# Patient Record
Sex: FEMALE | Race: BLACK OR AFRICAN AMERICAN | NOT HISPANIC OR LATINO | Employment: FULL TIME | URBAN - METROPOLITAN AREA
[De-identification: names, ages, dates, MRNs, and addresses within clinical notes are randomized per-mention and may not be internally consistent; named-entity substitution may affect disease eponyms.]

---

## 2024-08-09 ENCOUNTER — APPOINTMENT (OUTPATIENT)
Dept: PRIMARY CARE | Facility: CLINIC | Age: 44
End: 2024-08-09
Payer: COMMERCIAL

## 2024-08-09 VITALS
DIASTOLIC BLOOD PRESSURE: 80 MMHG | WEIGHT: 245 LBS | SYSTOLIC BLOOD PRESSURE: 118 MMHG | BODY MASS INDEX: 38.45 KG/M2 | HEIGHT: 67 IN | TEMPERATURE: 97.7 F | HEART RATE: 73 BPM | OXYGEN SATURATION: 100 %

## 2024-08-09 DIAGNOSIS — E61.1 LOW IRON: Primary | ICD-10-CM

## 2024-08-09 DIAGNOSIS — E07.9 DISORDER OF THYROID: ICD-10-CM

## 2024-08-09 DIAGNOSIS — R87.618 PAP SMEAR ABNORMALITY OF CERVIX/HUMAN PAPILLOMAVIRUS (HPV) POSITIVE: ICD-10-CM

## 2024-08-09 DIAGNOSIS — E66.9 CLASS 2 OBESITY: ICD-10-CM

## 2024-08-09 DIAGNOSIS — Z13.29 SCREENING FOR THYROID DISORDER: ICD-10-CM

## 2024-08-09 DIAGNOSIS — Z00.00 ROUTINE GENERAL MEDICAL EXAMINATION AT A HEALTH CARE FACILITY: ICD-10-CM

## 2024-08-09 DIAGNOSIS — Z12.31 BREAST CANCER SCREENING BY MAMMOGRAM: ICD-10-CM

## 2024-08-09 DIAGNOSIS — L98.7 EXCESS SKIN: ICD-10-CM

## 2024-08-09 DIAGNOSIS — Z13.21 ENCOUNTER FOR VITAMIN DEFICIENCY SCREENING: ICD-10-CM

## 2024-08-09 PROCEDURE — 99386 PREV VISIT NEW AGE 40-64: CPT | Performed by: FAMILY MEDICINE

## 2024-08-09 PROCEDURE — 3008F BODY MASS INDEX DOCD: CPT | Performed by: FAMILY MEDICINE

## 2024-08-09 RX ORDER — BISMUTH SUBSALICYLATE 262 MG
1 TABLET,CHEWABLE ORAL DAILY
COMMUNITY

## 2024-08-09 ASSESSMENT — PATIENT HEALTH QUESTIONNAIRE - PHQ9
2. FEELING DOWN, DEPRESSED OR HOPELESS: NOT AT ALL
1. LITTLE INTEREST OR PLEASURE IN DOING THINGS: NOT AT ALL
SUM OF ALL RESPONSES TO PHQ9 QUESTIONS 1 AND 2: 0

## 2024-08-09 NOTE — PATIENT INSTRUCTIONS
Neck mass: get ultrasound   Abnormal pap: needs repeat testing this year, referred to gynecology   Preventative: get preventative labs   Excess skin: pt lost >100lbs with diet and exercise, recommend plastic surgery for further evaluation

## 2024-08-19 ENCOUNTER — HOSPITAL ENCOUNTER (OUTPATIENT)
Dept: RADIOLOGY | Facility: HOSPITAL | Age: 44
Discharge: HOME | End: 2024-08-19
Payer: COMMERCIAL

## 2024-08-19 DIAGNOSIS — E07.9 DISORDER OF THYROID: ICD-10-CM

## 2024-08-19 PROCEDURE — 76536 US EXAM OF HEAD AND NECK: CPT

## 2024-08-19 PROCEDURE — 76536 US EXAM OF HEAD AND NECK: CPT | Performed by: STUDENT IN AN ORGANIZED HEALTH CARE EDUCATION/TRAINING PROGRAM

## 2024-08-21 ENCOUNTER — TELEPHONE (OUTPATIENT)
Dept: PRIMARY CARE | Facility: CLINIC | Age: 44
End: 2024-08-21
Payer: COMMERCIAL

## 2024-08-21 DIAGNOSIS — E07.9 DISORDER OF THYROID: Primary | ICD-10-CM

## 2024-08-21 NOTE — TELEPHONE ENCOUNTER
----- Message from Zoie Barboza sent at 8/21/2024 10:00 AM EDT -----  Please call patient and let then know the following:  US showed larger thyroid nodules. Sending patient to ENT for thyroid biopsy

## 2024-09-03 ENCOUNTER — APPOINTMENT (OUTPATIENT)
Dept: OTOLARYNGOLOGY | Facility: CLINIC | Age: 44
End: 2024-09-03
Payer: COMMERCIAL

## 2024-09-03 ENCOUNTER — LAB (OUTPATIENT)
Dept: LAB | Facility: LAB | Age: 44
End: 2024-09-03
Payer: COMMERCIAL

## 2024-09-03 VITALS — WEIGHT: 249.2 LBS | BODY MASS INDEX: 39.11 KG/M2 | HEIGHT: 67 IN | TEMPERATURE: 97.3 F

## 2024-09-03 DIAGNOSIS — E04.2 NONTOXIC MULTINODULAR GOITER: ICD-10-CM

## 2024-09-03 DIAGNOSIS — E61.1 LOW IRON: ICD-10-CM

## 2024-09-03 DIAGNOSIS — Z13.21 ENCOUNTER FOR VITAMIN DEFICIENCY SCREENING: ICD-10-CM

## 2024-09-03 DIAGNOSIS — E07.9 DISORDER OF THYROID: ICD-10-CM

## 2024-09-03 DIAGNOSIS — Z13.29 SCREENING FOR THYROID DISORDER: ICD-10-CM

## 2024-09-03 DIAGNOSIS — E04.1 THYROID NODULE: Primary | ICD-10-CM

## 2024-09-03 DIAGNOSIS — Z00.00 ROUTINE GENERAL MEDICAL EXAMINATION AT A HEALTH CARE FACILITY: ICD-10-CM

## 2024-09-03 LAB
25(OH)D3 SERPL-MCNC: 30 NG/ML (ref 30–100)
ALBUMIN SERPL BCP-MCNC: 4.1 G/DL (ref 3.4–5)
ALP SERPL-CCNC: 38 U/L (ref 33–110)
ALT SERPL W P-5'-P-CCNC: 9 U/L (ref 7–45)
ANION GAP SERPL CALC-SCNC: 11 MMOL/L (ref 10–20)
AST SERPL W P-5'-P-CCNC: 15 U/L (ref 9–39)
BASOPHILS # BLD AUTO: 0.06 X10*3/UL (ref 0–0.1)
BASOPHILS NFR BLD AUTO: 1.2 %
BILIRUB SERPL-MCNC: 0.5 MG/DL (ref 0–1.2)
BUN SERPL-MCNC: 12 MG/DL (ref 6–23)
CALCIUM SERPL-MCNC: 9.8 MG/DL (ref 8.6–10.6)
CHLORIDE SERPL-SCNC: 107 MMOL/L (ref 98–107)
CHOLEST SERPL-MCNC: 185 MG/DL (ref 0–199)
CHOLESTEROL/HDL RATIO: 2.8
CO2 SERPL-SCNC: 27 MMOL/L (ref 21–32)
CREAT SERPL-MCNC: 0.94 MG/DL (ref 0.5–1.05)
EGFRCR SERPLBLD CKD-EPI 2021: 78 ML/MIN/1.73M*2
EOSINOPHIL # BLD AUTO: 0.23 X10*3/UL (ref 0–0.7)
EOSINOPHIL NFR BLD AUTO: 4.8 %
ERYTHROCYTE [DISTWIDTH] IN BLOOD BY AUTOMATED COUNT: 19.6 % (ref 11.5–14.5)
EST. AVERAGE GLUCOSE BLD GHB EST-MCNC: 105 MG/DL
GLUCOSE SERPL-MCNC: 85 MG/DL (ref 74–99)
HBA1C MFR BLD: 5.3 %
HCT VFR BLD AUTO: 28.7 % (ref 36–46)
HDLC SERPL-MCNC: 65.5 MG/DL
HGB BLD-MCNC: 7.8 G/DL (ref 12–16)
IMM GRANULOCYTES # BLD AUTO: 0.01 X10*3/UL (ref 0–0.7)
IMM GRANULOCYTES NFR BLD AUTO: 0.2 % (ref 0–0.9)
IRON SATN MFR SERPL: ABNORMAL %
IRON SERPL-MCNC: 24 UG/DL (ref 35–150)
LDLC SERPL CALC-MCNC: 109 MG/DL
LYMPHOCYTES # BLD AUTO: 2.16 X10*3/UL (ref 1.2–4.8)
LYMPHOCYTES NFR BLD AUTO: 44.9 %
MCH RBC QN AUTO: 19 PG (ref 26–34)
MCHC RBC AUTO-ENTMCNC: 27.2 G/DL (ref 32–36)
MCV RBC AUTO: 70 FL (ref 80–100)
MONOCYTES # BLD AUTO: 0.45 X10*3/UL (ref 0.1–1)
MONOCYTES NFR BLD AUTO: 9.4 %
NEUTROPHILS # BLD AUTO: 1.9 X10*3/UL (ref 1.2–7.7)
NEUTROPHILS NFR BLD AUTO: 39.5 %
NON HDL CHOLESTEROL: 120 MG/DL (ref 0–149)
NRBC BLD-RTO: 0 /100 WBCS (ref 0–0)
PLATELET # BLD AUTO: 367 X10*3/UL (ref 150–450)
POTASSIUM SERPL-SCNC: 4.5 MMOL/L (ref 3.5–5.3)
PROT SERPL-MCNC: 7.4 G/DL (ref 6.4–8.2)
RBC # BLD AUTO: 4.1 X10*6/UL (ref 4–5.2)
SODIUM SERPL-SCNC: 140 MMOL/L (ref 136–145)
THYROPEROXIDASE AB SERPL-ACNC: <28 IU/ML
TIBC SERPL-MCNC: ABNORMAL UG/DL
TRIGL SERPL-MCNC: 54 MG/DL (ref 0–149)
TSH SERPL-ACNC: 0.64 MIU/L (ref 0.44–3.98)
UIBC SERPL-MCNC: >450 UG/DL (ref 110–370)
VIT B12 SERPL-MCNC: 745 PG/ML (ref 211–911)
VLDL: 11 MG/DL (ref 0–40)
WBC # BLD AUTO: 4.8 X10*3/UL (ref 4.4–11.3)

## 2024-09-03 PROCEDURE — 83550 IRON BINDING TEST: CPT

## 2024-09-03 PROCEDURE — 99204 OFFICE O/P NEW MOD 45 MIN: CPT | Performed by: OTOLARYNGOLOGY

## 2024-09-03 PROCEDURE — 36415 COLL VENOUS BLD VENIPUNCTURE: CPT

## 2024-09-03 PROCEDURE — 84443 ASSAY THYROID STIM HORMONE: CPT

## 2024-09-03 PROCEDURE — 82306 VITAMIN D 25 HYDROXY: CPT

## 2024-09-03 PROCEDURE — 80053 COMPREHEN METABOLIC PANEL: CPT

## 2024-09-03 PROCEDURE — 85025 COMPLETE CBC W/AUTO DIFF WBC: CPT

## 2024-09-03 PROCEDURE — 3008F BODY MASS INDEX DOCD: CPT | Performed by: OTOLARYNGOLOGY

## 2024-09-03 PROCEDURE — 80061 LIPID PANEL: CPT

## 2024-09-03 PROCEDURE — 83036 HEMOGLOBIN GLYCOSYLATED A1C: CPT

## 2024-09-03 PROCEDURE — 83540 ASSAY OF IRON: CPT

## 2024-09-03 PROCEDURE — 82607 VITAMIN B-12: CPT

## 2024-09-03 PROCEDURE — 1036F TOBACCO NON-USER: CPT | Performed by: OTOLARYNGOLOGY

## 2024-09-03 PROCEDURE — 84432 ASSAY OF THYROGLOBULIN: CPT

## 2024-09-03 PROCEDURE — 86800 THYROGLOBULIN ANTIBODY: CPT

## 2024-09-03 PROCEDURE — 86376 MICROSOMAL ANTIBODY EACH: CPT

## 2024-09-03 ASSESSMENT — PATIENT HEALTH QUESTIONNAIRE - PHQ9
SUM OF ALL RESPONSES TO PHQ9 QUESTIONS 1 AND 2: 0
1. LITTLE INTEREST OR PLEASURE IN DOING THINGS: NOT AT ALL
2. FEELING DOWN, DEPRESSED OR HOPELESS: NOT AT ALL

## 2024-09-03 NOTE — PROGRESS NOTES
CC:nodules in thyroid    Consulted by:dr manley    HPI:      Was in the ED for sinus, told she had a goiter, led to eval by family md and then an ultrasound    No swallowing or breathing issues    No XRT and famliy hx of thyroid CA                Past medical history: no DM, no HTN    Past surgical history:none    Social history: no smoking, social drinking, l    Family history:  Reviewed and not relevant to the presenting complaint    Current medications:      Reviewed as noted in current orders     Allergies:                               Reviewed and as noted in current orders    ROS:  All other systems have been reviewed and are negative for complaint. I personally reviewed the intake form that was scanned in today    PE:  CONSTITUTIONAL:  Vitals  -reviewed from intake field, well developed, well nourished.    VOICE:    RESPIRATION:  Breathing comfortably, no stridor.  CV:  No clubbing/cyanosis/edema in hands.  EYES:  EOM Intact, sclera normal.  NEURO:  Alert and oriented times 3, Cranial nerves II-XII intact and symmetric bilaterally.  HEAD AND FACE:  Symmetric facial features,  no masses or lesions, sinuses nontender to palpation.  SALIVARY GLANDS:  Parotid and submandibular glands normal bilaterally.  EARS:  Normal external ears, external auditory canals, and TMs to otoscopy, normal hearing to whispered voice.  NOSE:  External nose midline, anterior rhinoscopy is normal with limited visualization to the anterior aspect of the inferior turbinates.  No lesions noted.    ORAL CAVITY/OROPHARYNX/LIPS:  Normal mucous membranes, normal floor of mouth/tongue/OP, no masses or lesions are noted.  PHARYNGEAL WALLS AND NASOPHARYNX:  No masses noted. Mucosa appears clean and moist  NECK/LYMPH:  No LAD, no thyroid masses.  Larynx deviated to right with laryngeal prominence of midline to the right shown to patient  SKIN:  Neck skin is without scar or injury  PSYCH:  Alert and oriented with appropriate mood and  affect  Radiology reviewed:   I personally reviewed the ultrasound with the patient showing 2 left-sided TI-RADS 3 nodules 1 greater than 80 mm the other 30 mm        A/P: Large goiter with laryngotracheal deviation to right    Also with suspicion of substernal extension      Have discussed with her her options and will obtain blood work to see what her thyroid function is    Also have considered CT scan of the neck with contrast to better evaluate the anatomy as this may have implications on the whether we address this surgically or with observation based on anatomic distortion that is evident on physical exam currently

## 2024-09-05 ENCOUNTER — DOCUMENTATION (OUTPATIENT)
Dept: PRIMARY CARE | Facility: CLINIC | Age: 44
End: 2024-09-05
Payer: COMMERCIAL

## 2024-09-05 LAB
BILL ONLY-THYROGLOBULIN: NORMAL
HEMOGLOBIN A1C/HEMOGLOBIN TOTAL IN BLOOD EXTERNAL: 5.3 %
THYROGLOB AB SERPL-ACNC: <0.9 IU/ML (ref 0–4)
THYROGLOB SERPL-MCNC: 3744.4 NG/ML (ref 1.3–31.8)
THYROGLOB SERPL-MCNC: ABNORMAL NG/ML (ref 1.3–31.8)

## 2024-09-11 ENCOUNTER — OFFICE VISIT (OUTPATIENT)
Dept: URGENT CARE | Age: 44
End: 2024-09-11
Payer: COMMERCIAL

## 2024-09-11 VITALS
WEIGHT: 240 LBS | RESPIRATION RATE: 16 BRPM | TEMPERATURE: 98.8 F | DIASTOLIC BLOOD PRESSURE: 78 MMHG | HEART RATE: 102 BPM | SYSTOLIC BLOOD PRESSURE: 115 MMHG | OXYGEN SATURATION: 98 % | BODY MASS INDEX: 38.16 KG/M2

## 2024-09-11 DIAGNOSIS — R52 GENERALIZED BODY ACHES: Primary | ICD-10-CM

## 2024-09-11 DIAGNOSIS — R19.04 LEFT LOWER QUADRANT ABDOMINAL MASS: ICD-10-CM

## 2024-09-11 LAB — POC SARS-COV-2 AG BINAX: NORMAL

## 2024-09-11 ASSESSMENT — ENCOUNTER SYMPTOMS
HEADACHES: 1
CHILLS: 1
ABDOMINAL PAIN: 1
BACK PAIN: 1
FATIGUE: 1

## 2024-09-11 ASSESSMENT — PAIN SCALES - GENERAL: PAINLEVEL: 8

## 2024-09-11 NOTE — PATIENT INSTRUCTIONS
Abdominal mass needs more work ups such as [facility] in ED. Pt.'s  will drive her to [facility] ED

## 2024-09-11 NOTE — PROGRESS NOTES
Subjective   Patient ID: Radha Duncan is a 43 y.o. female. They present today with a chief complaint of Back Pain, Headache, and Other (Patient complains of body aches, no energy, stomach and back pain x this am).    History of Present Illness    Back Pain  Associated symptoms include abdominal pain and headaches.   Headache  Associated symptoms: abdominal pain, back pain and fatigue        Past Medical History  Allergies as of 09/11/2024 - Reviewed 09/11/2024   Allergen Reaction Noted    Penicillins Hives and Swelling 08/09/2024       (Not in a hospital admission)       History reviewed. No pertinent past medical history.    History reviewed. No pertinent surgical history.     reports that she has never smoked. She has never used smokeless tobacco. She reports current alcohol use of about 1.0 standard drink of alcohol per week. She reports that she does not use drugs.    Review of Systems  Review of Systems   Constitutional:  Positive for chills and fatigue.   Gastrointestinal:  Positive for abdominal pain.   Musculoskeletal:  Positive for back pain.   Neurological:  Positive for headaches.                                  Objective    Vitals:    09/11/24 1052   BP: 115/78   Pulse: 102   Resp: 16   Temp: 37.1 °C (98.8 °F)   SpO2: 98%   Weight: 109 kg (240 lb)     Patient's last menstrual period was 09/04/2024 (approximate).    Physical Exam  Constitutional:       Appearance: She is ill-appearing.   Cardiovascular:      Rate and Rhythm: Normal rate and regular rhythm.   Pulmonary:      Effort: Pulmonary effort is normal.      Breath sounds: Normal breath sounds.   Abdominal:      Palpations: There is mass.      Tenderness: There is abdominal tenderness in the suprapubic area and left lower quadrant.       Neurological:      Mental Status: She is lethargic.         Procedures    Point of Care Test & Imaging Results from this visit  Results for orders placed or performed in visit on 09/11/24   POCT Covid-19  Rapid Antigen   Result Value Ref Range    Binax NOW Covid Serial Number      BINAX NOW Covid Expiration      POC VAMSI-COV-2 AG  Presumptive negative test for SARS-CoV-2 (no antigen detected)     Presumptive negative test for SARS-CoV-2 (no antigen detected)     No results found.    Diagnostic study results (if any) were reviewed by Roselyn Hurt MD.    Assessment/Plan   Allergies, medications, history, and pertinent labs/EKGs/Imaging reviewed by Roselyn Hurt MD.     Medical Decision Making      Orders and Diagnoses  Diagnoses and all orders for this visit:  Generalized body aches  -     POCT Covid-19 Rapid Antigen  Left lower quadrant abdominal mass      Medical Admin Record      Follow Up Instructions  No follow-ups on file.    Patient disposition: ED    Electronically signed by Roselyn Hurt MD  11:28 AM

## 2024-09-13 ENCOUNTER — PATIENT OUTREACH (OUTPATIENT)
Dept: PRIMARY CARE | Facility: CLINIC | Age: 44
End: 2024-09-13
Payer: COMMERCIAL

## 2024-09-13 ENCOUNTER — DOCUMENTATION (OUTPATIENT)
Dept: PRIMARY CARE | Facility: CLINIC | Age: 44
End: 2024-09-13
Payer: COMMERCIAL

## 2024-09-13 RX ORDER — FERROUS SULFATE 325(65) MG
325 TABLET, DELAYED RELEASE (ENTERIC COATED) ORAL EVERY OTHER DAY
COMMUNITY
Start: 2024-09-12 | End: 2024-10-12

## 2024-09-13 RX ORDER — METRONIDAZOLE 500 MG/1
500 TABLET ORAL 3 TIMES DAILY
COMMUNITY
Start: 2024-09-12 | End: 2024-09-19

## 2024-09-13 RX ORDER — CEFDINIR 300 MG/1
300 CAPSULE ORAL 2 TIMES DAILY
COMMUNITY
Start: 2024-09-12 | End: 2024-09-19

## 2024-09-13 NOTE — PROGRESS NOTES
Discharge Facility: [facility]   Discharge Diagnosis: Left lower quadrant abdominal pain [R10.32]   Admission Date: 9-11-24  Discharge Date: 9-12-24     PCP Appointment Date: Message routed to office for scheduling (Virtual appointment is okay with pt.)     Specialist Appointment Date:    9/17/2024 9:15 AM (15 min)  Rfan   Arrive by:  9:00 AM   CT SOFT TISSUE NECK W IV CONTRAST     Hospital Encounter and Summary Linked: Yes  See discharge assessment below for further details  Medications  Medications reviewed with patient/caregiver?: Yes (9/13/2024 10:23 AM)  Is the patient having any side effects they believe may be caused by any medication additions or changes?: No (9/13/2024 10:23 AM)  Does the patient have all medications ordered at discharge?: Yes (9/13/2024 10:23 AM)  Care Management Interventions: No intervention needed (9/13/2024 10:23 AM)  Is the patient taking all medications as directed (includes completed medication regime)?: Yes (9/13/2024 10:23 AM)  Care Management Interventions: Provided patient education (9/13/2024 10:23 AM)    Appointments  Does the patient have a primary care provider?: Yes (9/13/2024 10:23 AM)  Care Management Interventions: Verified appointment date/time/provider (9/13/2024 10:23 AM)  Has the patient kept scheduled appointments due by today?: Yes (9/13/2024 10:23 AM)  Care Management Interventions: Advised patient to keep appointment (9/13/2024 10:23 AM)    Self Management  Has home health visited the patient within 72 hours of discharge?: Not applicable (9/13/2024 10:23 AM)    Patient Teaching  Does the patient have access to their discharge instructions?: Yes (9/13/2024 10:23 AM)  Care Management Interventions: Reviewed instructions with patient (9/13/2024 10:23 AM)  What is the patient's perception of their health status since discharge?: Improving (9/13/2024 10:23 AM)  Is the patient/caregiver able to teach back the hierarchy of who to call/visit for symptoms/problems?  PCP, Specialist, Home Health nurse, Urgent Care, ED, 911: Yes (9/13/2024 10:23 AM)      Kenji Robert LPN

## 2024-09-16 ENCOUNTER — TELEPHONE (OUTPATIENT)
Dept: OTOLARYNGOLOGY | Facility: HOSPITAL | Age: 44
End: 2024-09-16
Payer: COMMERCIAL

## 2024-09-16 ENCOUNTER — TELEPHONE (OUTPATIENT)
Dept: OTOLARYNGOLOGY | Facility: CLINIC | Age: 44
End: 2024-09-16
Payer: COMMERCIAL

## 2024-09-16 ENCOUNTER — HOSPITAL ENCOUNTER (INPATIENT)
Facility: HOSPITAL | Age: 44
End: 2024-09-16
Attending: EMERGENCY MEDICINE | Admitting: STUDENT IN AN ORGANIZED HEALTH CARE EDUCATION/TRAINING PROGRAM
Payer: COMMERCIAL

## 2024-09-16 ENCOUNTER — HOSPITAL ENCOUNTER (EMERGENCY)
Facility: HOSPITAL | Age: 44
Discharge: OTHER INSTITUTION | End: 2024-09-16
Attending: EMERGENCY MEDICINE
Payer: COMMERCIAL

## 2024-09-16 ENCOUNTER — APPOINTMENT (OUTPATIENT)
Dept: RADIOLOGY | Facility: HOSPITAL | Age: 44
End: 2024-09-16
Payer: COMMERCIAL

## 2024-09-16 VITALS
HEIGHT: 66 IN | BODY MASS INDEX: 38.57 KG/M2 | TEMPERATURE: 98.6 F | WEIGHT: 240 LBS | SYSTOLIC BLOOD PRESSURE: 135 MMHG | HEART RATE: 85 BPM | RESPIRATION RATE: 18 BRPM | OXYGEN SATURATION: 100 % | DIASTOLIC BLOOD PRESSURE: 85 MMHG

## 2024-09-16 DIAGNOSIS — R22.1 NECK MASS: Primary | ICD-10-CM

## 2024-09-16 DIAGNOSIS — R14.0 ABDOMINAL BLOATING: ICD-10-CM

## 2024-09-16 DIAGNOSIS — D50.9 IRON DEFICIENCY ANEMIA, UNSPECIFIED IRON DEFICIENCY ANEMIA TYPE: ICD-10-CM

## 2024-09-16 DIAGNOSIS — E04.9 GOITER: Primary | ICD-10-CM

## 2024-09-16 LAB
ALBUMIN SERPL BCP-MCNC: 3.6 G/DL (ref 3.4–5)
ALP SERPL-CCNC: 74 U/L (ref 33–110)
ALT SERPL W P-5'-P-CCNC: 16 U/L (ref 7–45)
ANION GAP SERPL CALC-SCNC: 15 MMOL/L (ref 10–20)
AST SERPL W P-5'-P-CCNC: 25 U/L (ref 9–39)
BASOPHILS # BLD AUTO: 0.06 X10*3/UL (ref 0–0.1)
BASOPHILS NFR BLD AUTO: 0.3 %
BILIRUB DIRECT SERPL-MCNC: 0.2 MG/DL (ref 0–0.3)
BILIRUB SERPL-MCNC: 0.6 MG/DL (ref 0–1.2)
BUN SERPL-MCNC: 6 MG/DL (ref 6–23)
CALCIUM SERPL-MCNC: 8.7 MG/DL (ref 8.6–10.3)
CHLORIDE SERPL-SCNC: 101 MMOL/L (ref 98–107)
CO2 SERPL-SCNC: 24 MMOL/L (ref 21–32)
CREAT SERPL-MCNC: 0.58 MG/DL (ref 0.5–1.05)
CRP SERPL-MCNC: 36.83 MG/DL
EGFRCR SERPLBLD CKD-EPI 2021: >90 ML/MIN/1.73M*2
EOSINOPHIL # BLD AUTO: 0.01 X10*3/UL (ref 0–0.7)
EOSINOPHIL NFR BLD AUTO: 0.1 %
ERYTHROCYTE [DISTWIDTH] IN BLOOD BY AUTOMATED COUNT: 21.6 % (ref 11.5–14.5)
ERYTHROCYTE [SEDIMENTATION RATE] IN BLOOD BY WESTERGREN METHOD: >130 MM/H (ref 0–20)
GLUCOSE SERPL-MCNC: 96 MG/DL (ref 74–99)
HCT VFR BLD AUTO: 30.3 % (ref 36–46)
HGB BLD-MCNC: 8.8 G/DL (ref 12–16)
HYPOCHROMIA BLD QL SMEAR: NORMAL
IMM GRANULOCYTES # BLD AUTO: 0.35 X10*3/UL (ref 0–0.7)
IMM GRANULOCYTES NFR BLD AUTO: 2 % (ref 0–0.9)
LYMPHOCYTES # BLD AUTO: 1.67 X10*3/UL (ref 1.2–4.8)
LYMPHOCYTES NFR BLD AUTO: 9.6 %
MCH RBC QN AUTO: 20.1 PG (ref 26–34)
MCHC RBC AUTO-ENTMCNC: 29 G/DL (ref 32–36)
MCV RBC AUTO: 69 FL (ref 80–100)
MONOCYTES # BLD AUTO: 1.49 X10*3/UL (ref 0.1–1)
MONOCYTES NFR BLD AUTO: 8.6 %
NEUTROPHILS # BLD AUTO: 13.79 X10*3/UL (ref 1.2–7.7)
NEUTROPHILS NFR BLD AUTO: 79.4 %
NRBC BLD-RTO: 0.1 /100 WBCS (ref 0–0)
OVALOCYTES BLD QL SMEAR: NORMAL
PLATELET # BLD AUTO: 411 X10*3/UL (ref 150–450)
POLYCHROMASIA BLD QL SMEAR: NORMAL
POTASSIUM SERPL-SCNC: 3.5 MMOL/L (ref 3.5–5.3)
PROT SERPL-MCNC: 7.8 G/DL (ref 6.4–8.2)
RBC # BLD AUTO: 4.37 X10*6/UL (ref 4–5.2)
RBC MORPH BLD: NORMAL
SCHISTOCYTES BLD QL SMEAR: NORMAL
SODIUM SERPL-SCNC: 136 MMOL/L (ref 136–145)
TARGETS BLD QL SMEAR: NORMAL
THYROPEROXIDASE AB SERPL-ACNC: <28 IU/ML
TSH SERPL-ACNC: 0.26 MIU/L (ref 0.44–3.98)
TSH SERPL-ACNC: 0.67 MIU/L (ref 0.44–3.98)
WBC # BLD AUTO: 17.4 X10*3/UL (ref 4.4–11.3)

## 2024-09-16 PROCEDURE — 84432 ASSAY OF THYROGLOBULIN: CPT | Performed by: EMERGENCY MEDICINE

## 2024-09-16 PROCEDURE — 86376 MICROSOMAL ANTIBODY EACH: CPT

## 2024-09-16 PROCEDURE — 84443 ASSAY THYROID STIM HORMONE: CPT | Performed by: EMERGENCY MEDICINE

## 2024-09-16 PROCEDURE — 82374 ASSAY BLOOD CARBON DIOXIDE: CPT | Performed by: EMERGENCY MEDICINE

## 2024-09-16 PROCEDURE — 70491 CT SOFT TISSUE NECK W/DYE: CPT | Performed by: RADIOLOGY

## 2024-09-16 PROCEDURE — 96361 HYDRATE IV INFUSION ADD-ON: CPT

## 2024-09-16 PROCEDURE — 99285 EMERGENCY DEPT VISIT HI MDM: CPT | Mod: 25

## 2024-09-16 PROCEDURE — 84443 ASSAY THYROID STIM HORMONE: CPT

## 2024-09-16 PROCEDURE — 2550000001 HC RX 255 CONTRASTS: Performed by: EMERGENCY MEDICINE

## 2024-09-16 PROCEDURE — 99223 1ST HOSP IP/OBS HIGH 75: CPT

## 2024-09-16 PROCEDURE — 82247 BILIRUBIN TOTAL: CPT | Performed by: EMERGENCY MEDICINE

## 2024-09-16 PROCEDURE — 86800 THYROGLOBULIN ANTIBODY: CPT | Performed by: EMERGENCY MEDICINE

## 2024-09-16 PROCEDURE — 86140 C-REACTIVE PROTEIN: CPT | Performed by: EMERGENCY MEDICINE

## 2024-09-16 PROCEDURE — 83735 ASSAY OF MAGNESIUM: CPT

## 2024-09-16 PROCEDURE — 70491 CT SOFT TISSUE NECK W/DYE: CPT

## 2024-09-16 PROCEDURE — 96374 THER/PROPH/DIAG INJ IV PUSH: CPT

## 2024-09-16 PROCEDURE — 99285 EMERGENCY DEPT VISIT HI MDM: CPT | Performed by: EMERGENCY MEDICINE

## 2024-09-16 PROCEDURE — 2500000004 HC RX 250 GENERAL PHARMACY W/ HCPCS (ALT 636 FOR OP/ED)

## 2024-09-16 PROCEDURE — 36415 COLL VENOUS BLD VENIPUNCTURE: CPT

## 2024-09-16 PROCEDURE — 36415 COLL VENOUS BLD VENIPUNCTURE: CPT | Performed by: EMERGENCY MEDICINE

## 2024-09-16 PROCEDURE — 84445 ASSAY OF TSI GLOBULIN: CPT

## 2024-09-16 PROCEDURE — 1210000001 HC SEMI-PRIVATE ROOM DAILY

## 2024-09-16 PROCEDURE — 99285 EMERGENCY DEPT VISIT HI MDM: CPT

## 2024-09-16 PROCEDURE — 84439 ASSAY OF FREE THYROXINE: CPT

## 2024-09-16 PROCEDURE — 2500000004 HC RX 250 GENERAL PHARMACY W/ HCPCS (ALT 636 FOR OP/ED): Performed by: EMERGENCY MEDICINE

## 2024-09-16 PROCEDURE — 85652 RBC SED RATE AUTOMATED: CPT | Performed by: EMERGENCY MEDICINE

## 2024-09-16 PROCEDURE — 2500000001 HC RX 250 WO HCPCS SELF ADMINISTERED DRUGS (ALT 637 FOR MEDICARE OP): Performed by: EMERGENCY MEDICINE

## 2024-09-16 PROCEDURE — 85025 COMPLETE CBC W/AUTO DIFF WBC: CPT | Performed by: EMERGENCY MEDICINE

## 2024-09-16 PROCEDURE — 96372 THER/PROPH/DIAG INJ SC/IM: CPT | Performed by: EMERGENCY MEDICINE

## 2024-09-16 RX ORDER — SODIUM CHLORIDE 9 MG/ML
125 INJECTION, SOLUTION INTRAVENOUS CONTINUOUS
Status: DISCONTINUED | OUTPATIENT
Start: 2024-09-16 | End: 2024-09-16 | Stop reason: HOSPADM

## 2024-09-16 RX ORDER — HYDROMORPHONE HYDROCHLORIDE 1 MG/ML
0.5 INJECTION, SOLUTION INTRAMUSCULAR; INTRAVENOUS; SUBCUTANEOUS ONCE
Status: COMPLETED | OUTPATIENT
Start: 2024-09-17 | End: 2024-09-17

## 2024-09-16 RX ORDER — HYDROMORPHONE HYDROCHLORIDE 1 MG/ML
0.5 INJECTION, SOLUTION INTRAMUSCULAR; INTRAVENOUS; SUBCUTANEOUS ONCE
Status: COMPLETED | OUTPATIENT
Start: 2024-09-16 | End: 2024-09-16

## 2024-09-16 RX ORDER — VANCOMYCIN HYDROCHLORIDE 1 G/20ML
INJECTION, POWDER, LYOPHILIZED, FOR SOLUTION INTRAVENOUS DAILY PRN
Status: DISCONTINUED | OUTPATIENT
Start: 2024-09-16 | End: 2024-09-16

## 2024-09-16 RX ORDER — OXYCODONE AND ACETAMINOPHEN 5; 325 MG/1; MG/1
1 TABLET ORAL ONCE
Status: COMPLETED | OUTPATIENT
Start: 2024-09-16 | End: 2024-09-16

## 2024-09-16 RX ORDER — VANCOMYCIN HYDROCHLORIDE 1 G/200ML
1000 INJECTION, SOLUTION INTRAVENOUS
Status: COMPLETED | OUTPATIENT
Start: 2024-09-16 | End: 2024-09-17

## 2024-09-16 RX ORDER — KETOROLAC TROMETHAMINE 30 MG/ML
15 INJECTION, SOLUTION INTRAMUSCULAR; INTRAVENOUS ONCE
Status: COMPLETED | OUTPATIENT
Start: 2024-09-16 | End: 2024-09-16

## 2024-09-16 RX ORDER — MORPHINE SULFATE 4 MG/ML
4 INJECTION, SOLUTION INTRAMUSCULAR; INTRAVENOUS ONCE
Status: COMPLETED | OUTPATIENT
Start: 2024-09-16 | End: 2024-09-16

## 2024-09-16 ASSESSMENT — COLUMBIA-SUICIDE SEVERITY RATING SCALE - C-SSRS
1. IN THE PAST MONTH, HAVE YOU WISHED YOU WERE DEAD OR WISHED YOU COULD GO TO SLEEP AND NOT WAKE UP?: NO
2. HAVE YOU ACTUALLY HAD ANY THOUGHTS OF KILLING YOURSELF?: NO
6. HAVE YOU EVER DONE ANYTHING, STARTED TO DO ANYTHING, OR PREPARED TO DO ANYTHING TO END YOUR LIFE?: NO
6. HAVE YOU EVER DONE ANYTHING, STARTED TO DO ANYTHING, OR PREPARED TO DO ANYTHING TO END YOUR LIFE?: NO
2. HAVE YOU ACTUALLY HAD ANY THOUGHTS OF KILLING YOURSELF?: NO
1. IN THE PAST MONTH, HAVE YOU WISHED YOU WERE DEAD OR WISHED YOU COULD GO TO SLEEP AND NOT WAKE UP?: NO

## 2024-09-16 ASSESSMENT — PAIN - FUNCTIONAL ASSESSMENT
PAIN_FUNCTIONAL_ASSESSMENT: 0-10

## 2024-09-16 ASSESSMENT — PAIN SCALES - GENERAL
PAINLEVEL_OUTOF10: 4
PAINLEVEL_OUTOF10: 10 - WORST POSSIBLE PAIN
PAINLEVEL_OUTOF10: 10 - WORST POSSIBLE PAIN
PAINLEVEL_OUTOF10: 3
PAINLEVEL_OUTOF10: 3

## 2024-09-16 ASSESSMENT — PAIN DESCRIPTION - FREQUENCY: FREQUENCY: CONSTANT/CONTINUOUS

## 2024-09-16 ASSESSMENT — PAIN DESCRIPTION - LOCATION: LOCATION: NECK

## 2024-09-16 ASSESSMENT — PAIN DESCRIPTION - PAIN TYPE: TYPE: ACUTE PAIN

## 2024-09-16 ASSESSMENT — PAIN DESCRIPTION - ORIENTATION: ORIENTATION: RIGHT

## 2024-09-16 NOTE — LETTER
September 25, 2024     Patient: Radha Duncan   YOB: 1980   Date of Visit: 9/16/2024       To Whom It May Concern:    Radha Duncan was seen the hospital between 9/16 - 9/25 at [facility]. Please excuse Radha for her absence from work until 9/30 for her hospitalization.     If you have any questions or concerns, please don't hesitate to call.         Sincerely,     Dr. Michael Reid

## 2024-09-16 NOTE — ED NOTES
Community Resource Name: No primary care physician  Phone Number:   Staff Member:  Deena Acosta    Discussed the following topics on behalf of the patient:  []  Behavioral Health Assistance     []  Case Management  []   Assistance  []  Digital Equity Assistance  []  Dental Health Assistance  []  Education Assistance  []  Employment Assistance  []  Financial Strain Relief Assistance  []  Food Insecurity Assistance  [x]  Healthcare Coverage Assistance  []  Housing Stability Assistance  []  IP Violence Relief Assistance  []  Legal Assistance  []  Physical Activity Assistance  []  Social Connection Assistance  []  Stress Relief Assistance   []  Substance Abuse Assistance  []  Transportation Assistance  []  Utility Assistance  [x]  Other: [insert comment here]  Patient does have a  upcoming appointment for a new PCP, CHW updated the patient chart.  Next Steps:         RUTHIE Troncoso   CHW talked to patient regarding not having a primary care physician. Patient expressed that she has a upcoming appointment with a new physician named Dr. Tylor Singh and Viv chavez. CHW updated the patient chart with the physician name added. Patient expressed appreciation.  Responsible Staff     RUTHIE Troncoso  09/16/24 [id number]

## 2024-09-16 NOTE — ED TRIAGE NOTES
Lump/mass on left side of her neck she noticed it x4+ months ago it is now hurting to swallow   Seen at [facility] for same issue last week

## 2024-09-16 NOTE — TELEPHONE ENCOUNTER
Just FRANKI:  Patient is now at [facility] ED stating she is in pain.  She called here asking to be seen by RR today and was advised that he is in the OR all day and does not see patient's on Mondays.

## 2024-09-16 NOTE — ED PROVIDER NOTES
HPI   Chief Complaint   Patient presents with    Neck Pain    Mass       HPI: []  43-year-old female with history of anxiety comes in with worsening neck pain.  She states she has neck mass was told she has goiter for the last few months scheduled to see ENT tomorrow was seen by ENT a few weeks ago recently recently hospitalized to outside facility for fever and abdominal pain.  Discharged on cefdinir and metronidazole for infection comes in with worsening neck pain and trouble swallowing.  No fever no chills.  No abdominal pain.  No hematemesis melena hematochezia no hemoptysis no headache vision changes.  No cough or shortness of breath.    Past history: Anxiety, goiter  Social: Patient denies current tobacco alcohol drug abuse.  REVIEW OF SYSTEMS:    GENERAL.: No weight loss, fatigue, anorexia, insomnia, fever.    EYES: No vision loss, double vision, drainage, eye pain.    ENT: No pharyngitis, dry mouth.  Positive neck pain, positive dysphagia    CARDIOPULMONARY: No chest pain, palpitations, syncope, near syncope. No shortness of breath, cough, hemoptysis.    GI: No abdominal pain, change in bowel habits, melena, hematemesis, hematochezia, nausea, vomiting, diarrhea.    : No discharge, dysuria, frequency, urgency, hematuria.    MS: No limb pain, joint pain, joint swelling.    SKIN: No rashes.    PSYCH: No depression, anxiety, suicidality, homicidality.    Review of systems is otherwise negative unless stated above or in history of present illness.  Social history, family history, allergies reviewed.  PHYSICAL EXAM:    GENERAL: Vitals noted, no distress. Alert and oriented  x 3. Non-toxic.  Appears uncomfortable    EENT: TMs clear. Posterior oropharynx unremarkable. No meningismus. No LAD.     NECK: Supple. Nontender. No midline tenderness.  Patient has a very impressive enlarged smooth goiter with some tracheal deviation.  And questionable extension with no nodules identified    CARDIAC: Regular, rate, rhythm.  No murmurs rubs or gallops. No JVD    PULMONARY: Lungs clear bilaterally with good aeration. No wheezes rales or rhonchi. No respiratory distress.     ABDOMEN: Soft, nonsurgical. Nontender. No peritoneal signs. Normoactive bowel sounds. No pulsatile masses.     EXTREMITIES: No peripheral edema. Negative Homans bilaterally, no cords.  2+ bounding possible perfused    SKIN: No rash. Intact.  Negative sweating    NEURO: No focal neurologic deficits, NIH score of 0. Cranial nerves normal as tested from II through XII.     MEDICAL DECISION MAKING:  CBC with differential shows leukocytosis 17,000, hematocrit 30, chemistry LFTs normal TSH is normal.  ESR more than 130 CRP 38  CT of the neck does show markedly enlarged thyroid with compressive features over the trachea and carotid.    Treatment in the ED: IV established, given IV fluids, oral Percocet and morphine pain control.    Consult: Discussed with ENT Dr. Taylor and Dr. Clement to [facility], transferred to [facility] currently recommended    ED course: Patient remained stable hemodynamically with no signs of thyrotoxicosis or thyroid storm    Impression: #1 goiter  Plan set MDM: 41-year-old female history of known goiter comes in with worsening pain and trouble swallowing currently no signs of airway compromise low concern for thyroid storm or thyrotoxicosis or airway compromise, the possibly for infection can be ruled out or thyroiditis cannot be ruled out, patient will be transferred to [facility] for further evaluation and treatment by ENT.                Patient History   No past medical history on file.  No past surgical history on file.  Family History   Problem Relation Name Age of Onset    Hypertension Mother      Hypertension Mother's Sister      Breast cancer Mother's Sister      Malig Hypertension Mother's Brother       Social History     Tobacco Use    Smoking status: Never    Smokeless tobacco: Never   Vaping Use    Vaping status: Never Used   Substance Use Topics     Alcohol use: Yes     Alcohol/week: 1.0 standard drink of alcohol     Types: 1 Standard drinks or equivalent per week    Drug use: Never       Physical Exam   ED Triage Vitals   Temperature Heart Rate Respirations BP   09/16/24 0939 09/16/24 0939 09/16/24 0939 09/16/24 0939   36.7 °C (98.1 °F) 100 18 (!) 177/102      Pulse Ox Temp src Heart Rate Source Patient Position   09/16/24 0939 -- -- --   100 %         BP Location FiO2 (%)     09/16/24 1624 --     Right arm        Physical Exam      ED Course & [facility]   ED Course as of 09/16/24 1653   Mon Sep 16, 2024   1653 Patient CT scan of the neck does confirm a markedly large goiter CBC shows leukocytosis, discussed with the ENT and transfer to [facility] ED recommended. [MT]      ED Course User Index  [MT] Estrella Ragsdale MD         Diagnoses as of 09/16/24 1653   Goiter                 No data recorded                                 Medical Decision Making      Procedure  Procedures     Estrella Ragsdale MD  09/16/24 1658

## 2024-09-16 NOTE — LETTER
September 18, 2024     To Whom It May Concern:    Mr. Adan Hernadez has been accompanying a patient who is currently hospitalized at [facility]. Please excuse his absence from 9/16/24-9/18/24.    If you have any questions or concerns, please don't hesitate to call.        Sincerely,  sOcar Fuller MD

## 2024-09-17 ENCOUNTER — APPOINTMENT (OUTPATIENT)
Dept: OTOLARYNGOLOGY | Facility: CLINIC | Age: 44
End: 2024-09-17
Payer: COMMERCIAL

## 2024-09-17 ENCOUNTER — APPOINTMENT (OUTPATIENT)
Dept: PRIMARY CARE | Facility: CLINIC | Age: 44
End: 2024-09-17
Payer: COMMERCIAL

## 2024-09-17 ENCOUNTER — CLINICAL SUPPORT (OUTPATIENT)
Dept: EMERGENCY MEDICINE | Facility: HOSPITAL | Age: 44
DRG: 645 | End: 2024-09-17
Payer: COMMERCIAL

## 2024-09-17 ENCOUNTER — HOSPITAL ENCOUNTER (OUTPATIENT)
Dept: RADIOLOGY | Facility: CLINIC | Age: 44
End: 2024-09-17
Payer: COMMERCIAL

## 2024-09-17 LAB
APPEARANCE UR: ABNORMAL
ATRIAL RATE: 109 BPM
BILIRUB UR STRIP.AUTO-MCNC: NEGATIVE MG/DL
COLOR UR: YELLOW
GLUCOSE UR STRIP.AUTO-MCNC: NORMAL MG/DL
HOLD SPECIMEN: NORMAL
KETONES UR STRIP.AUTO-MCNC: ABNORMAL MG/DL
LEUKOCYTE ESTERASE UR QL STRIP.AUTO: NEGATIVE
MAGNESIUM SERPL-MCNC: 1.77 MG/DL (ref 1.6–2.4)
MUCOUS THREADS #/AREA URNS AUTO: ABNORMAL /LPF
NITRITE UR QL STRIP.AUTO: NEGATIVE
P AXIS: 55 DEGREES
P OFFSET: 213 MS
P ONSET: 160 MS
PH UR STRIP.AUTO: 5.5 [PH]
PR INTERVAL: 124 MS
PROT UR STRIP.AUTO-MCNC: ABNORMAL MG/DL
Q ONSET: 222 MS
QRS COUNT: 18 BEATS
QRS DURATION: 78 MS
QT INTERVAL: 314 MS
QTC CALCULATION(BAZETT): 422 MS
QTC FREDERICIA: 383 MS
R AXIS: 22 DEGREES
RBC # UR STRIP.AUTO: ABNORMAL /UL
RBC #/AREA URNS AUTO: >20 /HPF
SP GR UR STRIP.AUTO: 1.05
SQUAMOUS #/AREA URNS AUTO: ABNORMAL /HPF
T AXIS: 37 DEGREES
T OFFSET: 379 MS
T4 FREE SERPL-MCNC: 1.29 NG/DL (ref 0.78–1.48)
UROBILINOGEN UR STRIP.AUTO-MCNC: NORMAL MG/DL
VENTRICULAR RATE: 109 BPM
WBC #/AREA URNS AUTO: ABNORMAL /HPF

## 2024-09-17 PROCEDURE — 93005 ELECTROCARDIOGRAM TRACING: CPT

## 2024-09-17 PROCEDURE — 2500000002 HC RX 250 W HCPCS SELF ADMINISTERED DRUGS (ALT 637 FOR MEDICARE OP, ALT 636 FOR OP/ED)

## 2024-09-17 PROCEDURE — 87086 URINE CULTURE/COLONY COUNT: CPT

## 2024-09-17 PROCEDURE — 36415 COLL VENOUS BLD VENIPUNCTURE: CPT

## 2024-09-17 PROCEDURE — 2500000004 HC RX 250 GENERAL PHARMACY W/ HCPCS (ALT 636 FOR OP/ED)

## 2024-09-17 PROCEDURE — 87081 CULTURE SCREEN ONLY: CPT

## 2024-09-17 PROCEDURE — 99222 1ST HOSP IP/OBS MODERATE 55: CPT | Performed by: OTOLARYNGOLOGY

## 2024-09-17 PROCEDURE — 99233 SBSQ HOSP IP/OBS HIGH 50: CPT | Performed by: STUDENT IN AN ORGANIZED HEALTH CARE EDUCATION/TRAINING PROGRAM

## 2024-09-17 PROCEDURE — 87040 BLOOD CULTURE FOR BACTERIA: CPT

## 2024-09-17 PROCEDURE — 1100000001 HC PRIVATE ROOM DAILY

## 2024-09-17 PROCEDURE — 81001 URINALYSIS AUTO W/SCOPE: CPT

## 2024-09-17 PROCEDURE — 2500000001 HC RX 250 WO HCPCS SELF ADMINISTERED DRUGS (ALT 637 FOR MEDICARE OP)

## 2024-09-17 RX ORDER — LEVOFLOXACIN 5 MG/ML
750 INJECTION, SOLUTION INTRAVENOUS EVERY 24 HOURS
Status: COMPLETED | OUTPATIENT
Start: 2024-09-17 | End: 2024-09-21

## 2024-09-17 RX ORDER — HYDROMORPHONE HYDROCHLORIDE 1 MG/ML
0.4 INJECTION, SOLUTION INTRAMUSCULAR; INTRAVENOUS; SUBCUTANEOUS EVERY 4 HOURS PRN
Status: DISPENSED | OUTPATIENT
Start: 2024-09-17

## 2024-09-17 RX ORDER — FLUTICASONE PROPIONATE 50 MCG
1 SPRAY, SUSPENSION (ML) NASAL DAILY
Status: ON HOLD | COMMUNITY

## 2024-09-17 RX ORDER — ACETAMINOPHEN 325 MG/1
975 TABLET ORAL 3 TIMES DAILY
Status: DISPENSED | OUTPATIENT
Start: 2024-09-17

## 2024-09-17 RX ORDER — OXYCODONE HYDROCHLORIDE 5 MG/1
5 TABLET ORAL EVERY 6 HOURS PRN
Status: DISCONTINUED | OUTPATIENT
Start: 2024-09-17 | End: 2024-09-18

## 2024-09-17 RX ORDER — POTASSIUM CHLORIDE 20 MEQ/1
40 TABLET, EXTENDED RELEASE ORAL ONCE
Status: COMPLETED | OUTPATIENT
Start: 2024-09-17 | End: 2024-09-17

## 2024-09-17 RX ORDER — FERROUS SULFATE 325(65) MG
65 TABLET ORAL DAILY
Status: DISPENSED | OUTPATIENT
Start: 2024-09-17

## 2024-09-17 RX ORDER — POLYETHYLENE GLYCOL 3350 17 G/17G
17 POWDER, FOR SOLUTION ORAL DAILY
Status: DISCONTINUED | OUTPATIENT
Start: 2024-09-17 | End: 2024-09-21

## 2024-09-17 SDOH — SOCIAL STABILITY: SOCIAL INSECURITY: HAVE YOU HAD ANY THOUGHTS OF HARMING ANYONE ELSE?: NO

## 2024-09-17 SDOH — SOCIAL STABILITY: SOCIAL INSECURITY: DO YOU FEEL UNSAFE GOING BACK TO THE PLACE WHERE YOU ARE LIVING?: NO

## 2024-09-17 SDOH — SOCIAL STABILITY: SOCIAL INSECURITY: ARE YOU OR HAVE YOU BEEN THREATENED OR ABUSED PHYSICALLY, EMOTIONALLY, OR SEXUALLY BY ANYONE?: NO

## 2024-09-17 SDOH — ECONOMIC STABILITY: HOUSING INSECURITY: AT ANY TIME IN THE PAST 12 MONTHS, WERE YOU HOMELESS OR LIVING IN A SHELTER (INCLUDING NOW)?: NO

## 2024-09-17 SDOH — ECONOMIC STABILITY: TRANSPORTATION INSECURITY
IN THE PAST 12 MONTHS, HAS THE LACK OF TRANSPORTATION KEPT YOU FROM MEDICAL APPOINTMENTS OR FROM GETTING MEDICATIONS?: NO

## 2024-09-17 SDOH — SOCIAL STABILITY: SOCIAL INSECURITY: DO YOU FEEL ANYONE HAS EXPLOITED OR TAKEN ADVANTAGE OF YOU FINANCIALLY OR OF YOUR PERSONAL PROPERTY?: NO

## 2024-09-17 SDOH — SOCIAL STABILITY: SOCIAL INSECURITY: DOES ANYONE TRY TO KEEP YOU FROM HAVING/CONTACTING OTHER FRIENDS OR DOING THINGS OUTSIDE YOUR HOME?: NO

## 2024-09-17 SDOH — SOCIAL STABILITY: SOCIAL INSECURITY: HAS ANYONE EVER THREATENED TO HURT YOUR FAMILY OR YOUR PETS?: NO

## 2024-09-17 SDOH — ECONOMIC STABILITY: INCOME INSECURITY: HOW HARD IS IT FOR YOU TO PAY FOR THE VERY BASICS LIKE FOOD, HOUSING, MEDICAL CARE, AND HEATING?: NOT VERY HARD

## 2024-09-17 SDOH — ECONOMIC STABILITY: INCOME INSECURITY: IN THE LAST 12 MONTHS, WAS THERE A TIME WHEN YOU WERE NOT ABLE TO PAY THE MORTGAGE OR RENT ON TIME?: NO

## 2024-09-17 SDOH — SOCIAL STABILITY: SOCIAL INSECURITY: ABUSE: ADULT

## 2024-09-17 SDOH — ECONOMIC STABILITY: HOUSING INSECURITY: IN THE PAST 12 MONTHS, HOW MANY TIMES HAVE YOU MOVED WHERE YOU WERE LIVING?: 1

## 2024-09-17 SDOH — SOCIAL STABILITY: SOCIAL INSECURITY: ARE THERE ANY APPARENT SIGNS OF INJURIES/BEHAVIORS THAT COULD BE RELATED TO ABUSE/NEGLECT?: NO

## 2024-09-17 SDOH — ECONOMIC STABILITY: TRANSPORTATION INSECURITY
IN THE PAST 12 MONTHS, HAS LACK OF TRANSPORTATION KEPT YOU FROM MEETINGS, WORK, OR FROM GETTING THINGS NEEDED FOR DAILY LIVING?: NO

## 2024-09-17 SDOH — SOCIAL STABILITY: SOCIAL INSECURITY: WERE YOU ABLE TO COMPLETE ALL THE BEHAVIORAL HEALTH SCREENINGS?: YES

## 2024-09-17 SDOH — SOCIAL STABILITY: SOCIAL INSECURITY: HAVE YOU HAD THOUGHTS OF HARMING ANYONE ELSE?: NO

## 2024-09-17 ASSESSMENT — ACTIVITIES OF DAILY LIVING (ADL)
JUDGMENT_ADEQUATE_SAFELY_COMPLETE_DAILY_ACTIVITIES: YES
GROOMING: INDEPENDENT
PATIENT'S MEMORY ADEQUATE TO SAFELY COMPLETE DAILY ACTIVITIES?: YES
HEARING - LEFT EAR: FUNCTIONAL
BATHING: INDEPENDENT
HEARING - RIGHT EAR: FUNCTIONAL
FEEDING YOURSELF: INDEPENDENT
DRESSING YOURSELF: INDEPENDENT
TOILETING: INDEPENDENT
WALKS IN HOME: INDEPENDENT
ADEQUATE_TO_COMPLETE_ADL: YES

## 2024-09-17 ASSESSMENT — LIFESTYLE VARIABLES
HOW OFTEN DO YOU HAVE 6 OR MORE DRINKS ON ONE OCCASION: PATIENT DECLINED
AUDIT-C TOTAL SCORE: -1
AUDIT-C TOTAL SCORE: -1
HOW MANY STANDARD DRINKS CONTAINING ALCOHOL DO YOU HAVE ON A TYPICAL DAY: PATIENT DECLINED
HOW OFTEN DO YOU HAVE A DRINK CONTAINING ALCOHOL: PATIENT DECLINED
SKIP TO QUESTIONS 9-10: 0

## 2024-09-17 ASSESSMENT — PAIN SCALES - GENERAL
PAINLEVEL_OUTOF10: 10 - WORST POSSIBLE PAIN
PAINLEVEL_OUTOF10: 8
PAINLEVEL_OUTOF10: 5 - MODERATE PAIN
PAINLEVEL_OUTOF10: 10 - WORST POSSIBLE PAIN
PAINLEVEL_OUTOF10: 10 - WORST POSSIBLE PAIN
PAINLEVEL_OUTOF10: 8
PAINLEVEL_OUTOF10: 5 - MODERATE PAIN

## 2024-09-17 ASSESSMENT — COGNITIVE AND FUNCTIONAL STATUS - GENERAL
MOBILITY SCORE: 24
PATIENT BASELINE BEDBOUND: NO
DAILY ACTIVITIY SCORE: 24

## 2024-09-17 ASSESSMENT — PATIENT HEALTH QUESTIONNAIRE - PHQ9
SUM OF ALL RESPONSES TO PHQ9 QUESTIONS 1 & 2: 0
1. LITTLE INTEREST OR PLEASURE IN DOING THINGS: NOT AT ALL
2. FEELING DOWN, DEPRESSED OR HOPELESS: NOT AT ALL

## 2024-09-17 ASSESSMENT — PAIN SCALES - PAIN ASSESSMENT IN ADVANCED DEMENTIA (PAINAD)
TOTALSCORE: MEDICATION (SEE MAR)

## 2024-09-17 ASSESSMENT — PAIN - FUNCTIONAL ASSESSMENT
PAIN_FUNCTIONAL_ASSESSMENT: 0-10
PAIN_FUNCTIONAL_ASSESSMENT: 0-10

## 2024-09-17 ASSESSMENT — PAIN DESCRIPTION - LOCATION
LOCATION: NECK
LOCATION: EYE
LOCATION: NECK
LOCATION: NECK

## 2024-09-17 NOTE — CONSULTS
ENT DEPARTMENT CONSULTATION NOTE  Name: Radha Duncan  MRN: [id number]  : 1980  Consulting Team: ED, Adarsh Silva MD  Reason for Consult: Neck pain    History of Present Illness  The patient is a 43 y.o. female w pmhx of fibroids (s/p recent hospitilizatoin for fibroid degeneration and IUD displacement, thyroid goiter (currently being evaluated outpatient by Dr. Gan), anxiety and depression who was transferred to [facility] on 2024. Patient initially presented to [facility] for evaluation and management of severe, persistent neck pain x 6 days. Pain began last week () in the front, left side of her neck. Pain is non-radiating and persistent throughout day/night. Pain worsens with palpation and changes in head position though is still present when neck is not being touched. Upon evaluation on , patient states pain is only mildly improved.     Associated symptoms include fever, chills, voice changes and neck swelling. 24-48 hours after onset of neck pain, patient began noticing changes in her voice and neck swelling. Patient notes both symptoms were subtle, but they continue to persist. She describes the voice changes as needing more effort to produce sound and hoarseness. Patient states neck swelling is slightly worsened from baseline with goiter. She reports recurring fevers, chills and weakness since symptoms onset 5-6 days ago. Physical activity has been limited d/t patient feeling ill since last week and has spent most of her time in bed. Denies remote history of voice trembling, abnormal breath sounds, difficulty breathing, SOB or syncope. Denies recent changes to home life or diet. Patient is allergic to penicillins, for which she denies any recent exposure.     Patient was recently hospitalized 2024 at [facility] d/t c/f  gyn-related infection. Providers noted degenerated fibroid and displaced IUD as possible contributors to presentation at that time. She was discharged home on  cefdinir and metronidazole. Patient states she took full course.     Of note, ultrasound of thyroid on 8/9/2024 demonstrated a TI-RADS 3 thyroid nodule of the left thyroid measuring 8.7 cm along with another TI-RADS 3 thyroid nodule measuring 3 cm.  Patient was scheduled for thyroid biopsy with Dr. Gan tomorrow, 9/17.      Review of Systems  As stated in HPI.    Past Medical History  No past medical history on file.    Past Surgical History  No past surgical history on file.    Allergies  Allergies   Allergen Reactions    Penicillins Hives and Swelling     Eye swelling       Medications    Current Facility-Administered Medications:     acetaminophen (Tylenol) tablet 975 mg, 975 mg, oral, TID, Stanton Landrum MD, 975 mg at 09/17/24 0806    ferrous sulfate (325 mg ferrous sulfate) tablet 325 mg, 65 mg of iron, oral, Daily, Stanton Landrum MD, 325 mg at 09/17/24 0806    HYDROmorphone (Dilaudid) injection 0.4 mg, 0.4 mg, intravenous, q4h PRN, Stanton Landrum MD, 0.4 mg at 09/17/24 1304    levoFLOXacin (Levaquin) 750 mg in dextrose 5%  mL, 750 mg, intravenous, q24h, Stanton Landrum MD, Stopped at 09/17/24 0437    oxyCODONE (Roxicodone) immediate release tablet 5 mg, 5 mg, oral, q6h PRN, Stanton Landrum MD, 5 mg at 09/17/24 0521    polyethylene glycol (Glycolax, Miralax) packet 17 g, 17 g, oral, Daily, Stanton Landrum MD, 17 g at 09/17/24 0806    Current Outpatient Medications:     cholecalciferol, vitamin D3, (VITAMIN D3 ORAL), Take 1 tablet by mouth once daily., Disp: , Rfl:     multivitamin tablet, Take 1 tablet by mouth once daily., Disp: , Rfl:     psyllium seed, with sugar, (FIBER ORAL), Take 1 Dose by mouth once daily as needed., Disp: , Rfl:     cefdinir (Omnicef) 300 mg capsule, Take 1 capsule (300 mg) by mouth twice a day., Disp: , Rfl:     ferrous sulfate 325 (65 Fe) MG EC tablet, Take 1 tablet by mouth every other day., Disp: , Rfl:     fluticasone (Flonase) 50 mcg/actuation nasal spray, Administer 1 spray into each  nostril once daily. Shake gently. Before first use, prime pump. After use, clean tip and replace cap., Disp: , Rfl:     metroNIDAZOLE (Flagyl) 500 mg tablet, Take 1 tablet (500 mg) by mouth 3 times a day., Disp: , Rfl:     Family History  Family History   Problem Relation Name Age of Onset    Hypertension Mother      Hypertension Mother's Sister      Breast cancer Mother's Sister      Malig Hypertension Mother's Brother         Social History  Social History     Socioeconomic History    Marital status:      Spouse name: Not on file    Number of children: Not on file    Years of education: Not on file    Highest education level: Not on file   Occupational History    Occupation: Human resources   Tobacco Use    Smoking status: Never    Smokeless tobacco: Never   Vaping Use    Vaping status: Never Used   Substance and Sexual Activity    Alcohol use: Yes     Alcohol/week: 1.0 standard drink of alcohol     Types: 1 Standard drinks or equivalent per week    Drug use: Never    Sexual activity: Yes     Birth control/protection: I.U.D.   Other Topics Concern    Not on file   Social History Narrative    Not on file     Social Determinants of Health     Financial Resource Strain: Not on file   Food Insecurity: Not on file   Transportation Needs: Not on file   Physical Activity: Not on file   Stress: Not on file   Social Connections: Not on file   Intimate Partner Violence: Not on file   Housing Stability: Not on file       Vital Signs  Vitals:    09/17/24 1310   BP: 139/86   Pulse:    Resp:    Temp:    SpO2: 98%     Physical Examination  GEN: The patient appears stated age in no acute distress  VOICE: Mild dysphonia present, mild effort-related voice weakness, no pitch/voice breaks   RESP: Unlabored on room air with no audible stertor or stridor  CV: Clinically well perfused   EYES: EOM grossly intact with no scleral icterus  NEURO: Alert and oriented with no focal deficits and CN II-XII symmetric and intact  bilaterally  HEAD: Scalp is normocephalic and atraumatic  FACE: No abrasions or lacerations, no maxillary or mandibular stepoffs  EARS: Normal external ears  NOSE: External nose appears normal  OC: Normal lips, normal buccal mucosa, normal floor of mouth, normal tongue, normal hard palate, normal retromolar trigone  OP: normal pharyngeal walls, normal soft palate  NECK: Moderate swelling present in neck, L>R, consistent with known thyroid goiter, without evidence of significant erythema. No cervical lymphadenopathy. Tenderness to palpation.     Laboratory and Data  Results for orders placed or performed during the hospital encounter of 09/16/24 (from the past 24 hour(s))   Thyroid Peroxidase Antibody   Result Value Ref Range    Thyroid Peroxidase (TPO) Antibody <28 <=60 IU/mL   TSH with reflex to Free T4 if abnormal   Result Value Ref Range    Thyroid Stimulating Hormone 0.26 (L) 0.44 - 3.98 mIU/L   Thyroxine, Free   Result Value Ref Range    Thyroxine, Free 1.29 0.78 - 1.48 ng/dL   Magnesium   Result Value Ref Range    Magnesium 1.77 1.60 - 2.40 mg/dL   Urinalysis with Reflex Culture and Microscopic   Result Value Ref Range    Color, Urine Yellow Light-Yellow, Yellow, Dark-Yellow    Appearance, Urine Turbid (N) Clear    Specific Gravity, Urine 1.048 (N) 1.005 - 1.035    pH, Urine 5.5 5.0, 5.5, 6.0, 6.5, 7.0, 7.5, 8.0    Protein, Urine 100 (2+) (A) NEGATIVE, 10 (TRACE), 20 (TRACE) mg/dL    Glucose, Urine Normal Normal mg/dL    Blood, Urine 0.1 (1+) (A) NEGATIVE    Ketones, Urine 20 (1+) (A) NEGATIVE mg/dL    Bilirubin, Urine NEGATIVE NEGATIVE    Urobilinogen, Urine Normal Normal mg/dL    Nitrite, Urine NEGATIVE NEGATIVE    Leukocyte Esterase, Urine NEGATIVE NEGATIVE   Urinalysis Microscopic   Result Value Ref Range    WBC, Urine 11-20 (A) 1-5, NONE /HPF    RBC, Urine >20 (A) NONE, 1-2, 3-5 /HPF    Squamous Epithelial Cells, Urine 10-25 (FEW) Reference range not established. /HPF    Mucus, Urine 3+ Reference range  not established. /LPF   Blood Culture    Specimen: Peripheral Venipuncture; Blood culture   Result Value Ref Range    Blood Culture Loaded on Instrument - Culture in progress    Blood Culture    Specimen: Peripheral Venipuncture; Blood culture   Result Value Ref Range    Blood Culture Loaded on Instrument - Culture in progress    ECG 12 lead   Result Value Ref Range    Ventricular Rate 109 BPM    Atrial Rate 109 BPM    WI Interval 124 ms    QRS Duration 78 ms    QT Interval 314 ms    QTC Calculation(Bazett) 422 ms    P Axis 55 degrees    R Axis 22 degrees    T Axis 37 degrees    QRS Count 18 beats    Q Onset 222 ms    P Onset 160 ms    P Offset 213 ms    T Offset 379 ms    QTC Fredericia 383 ms       Radiology Reviewed  Massive left-sided goiter measuring greater than 10 cm within the left thyroid causing deviation of the trachea to the right.     Assessment  Radha Duncan is a 43 y.o. female with pmhx of fibroids, thyroid goiter (followed by Dr. Gan outpatient), and anxiety/depression who was [place] [facility] [place] [facility] ED for further workup of acute, severe neck pain a/w  subjective fever, chills, new-onset voice changes and neck swelling x 6 days. Workup remarkable for left thyroid goiter measuring greater than 10 cm with rightward tracheal deviation on CT neck, leukocytosis and elevated CRP. Patient was admitted to medicine inpatient service 9/16. ENT was consulted for evaluation of airway compromise I/s/o known thyroid goiter with new-onset voice changes and neck swelling x 5 days. Patient is currently seeing Dr. Gan outpatient for thyroid workup and was previously scheduled for thyroid biopsy 9/17. Based on ENT evaluation, there is little concern for airway compromise at this time. Current presentation likely due to infectious vs inflammatory process. The T4 is normal and so acute thyroiditis is not likely.        Recommendations  -Admission to medicine for IV antibiotics  -Recommend  endocrine consultation, obtaining thyroid labs including TSH, T4/T3, and antithyroid peroxidase  -ENT may scope 9/17 depending on patient preference  -Formal recommendations to, once patient is formally seen and staffed by attending    Staffing update 9/17:  -ENT evaluation not concerning for airway compromise. No current indications to scope patient at this time.   The goiter is a chronic issue and we do not recommend biopsy at this time.  -Recommend undergoing biopsy outpatient at a later date. Also discussed with Dr. Gan who agrees with this plan.   -Mild retropharyngeal edema appreciated on neck CT, which may be contributing to current presentation. Consider steroids for edema management.  -Agree with above recommendation in considering endocrinology consult for further workup.   -Defer to primary team for rest of inpatient management.      Patient was seen and discussed with attending, Dr. Briones. We also discussed with Dr. Gan the Head and Neck attending who agreed with these recommendations. ENT will continue to follow. Please contact for any questions or concerns.      Sushila Gutierrez MD, PGY-1  ENT Consult Service    I am the night resident. I can only be contacted from 5 PM to 6 AM. Page on weekends or off hours.   ENT Consult pager: [phone]  ENT Peds pager: [phone]  ENT Head & Neck Surgery Phone: [phone]  ENT subspecialty team: Benjamin individual resident who wrote today's note  ENT Outpatient scheduling number: [phone]  Please Page [phone] or call [phone] if Urgent    Attending attestation: I, Vinnie Briones MD FACS, reviewed the note and performed a complete history and physical on the patient and examined the patient at the bedside. I agree with the note as outlined with changes made by myself directly to this note.

## 2024-09-17 NOTE — ED PROVIDER NOTES
CC: Neck Pain     History provided by: Patient and EMS  Limitations to History: None    HPI:  Patient is a 43-year-old female with history of fibroids, neck mass, anxiety, depression who presents as a transfer from [facility] emergency department for ENT evaluation.  Patient was initially seen at [facility] for worsening neck pain, she has known neck mass possible goiter and was scheduled to see ENT tomorrow for outpatient follow-up.  Patient states she felt like she is having fevers but has not checked her temperature.  She states she can swallow okay but does have  pain with swallowing and severe neck pain, has difficulty moving her neck, feels like her voice has changed.  She is tolerating her secretions, has no trismus.  She states her abdominal pain from fibroid issues have improved and she is still taking antibiotics for that.    I reviewed ED note from [facility] from September 12, 2024 when patient was seen for abdominal pain, found to have degeneration of large fibroid and discharged on antibiotics such as cefdinir and Flagyl.    External Records Reviewed:  I reviewed prior ED visits, Care Everywhere, discharge summaries and outpatient records as appropriate.   ???????????????????????????????????????????????????????????????  Triage Vitals:  T 37.7 °C (99.9 °F)  HR (!) 111  BP (!) 182/88  RR 18  O2 98 % None (Room air)    Physical Exam  Vitals and nursing note reviewed.   Constitutional:       General: She is not in acute distress.     Appearance: Normal appearance.   HENT:      Head: Normocephalic and atraumatic.   Eyes:      Conjunctiva/sclera: Conjunctivae normal.   Neck:      Comments: Left-sided neck mass that spans across anterior neck, tender to palpation, no overlying skin changes, open wounds or crepitus  Cardiovascular:      Rate and Rhythm: Normal rate and regular rhythm.   Pulmonary:      Effort: Pulmonary effort is normal. No respiratory distress.   Abdominal:      General: Abdomen is flat.      Palpations:  Abdomen is soft.   Musculoskeletal:         General: Normal range of motion.      Cervical back: Normal range of motion and neck supple.   Skin:     General: Skin is warm and dry.   Neurological:      General: No focal deficit present.      Mental Status: She is alert and oriented to person, place, and time. Mental status is at baseline.   Psychiatric:         Mood and Affect: Mood normal.         Behavior: Behavior normal.        ???????????????????????????????????????????????????????????????  ED Course/Treatment/Medical Decision Making  MDM:  Patient is a 43-year-old female who presents as a transfer from outside hospital for ENT evaluation in the setting of neck mass and neck pain.  At this time, patient is tachycardic and hypertensive likely secondary to pain, ice pack given.  She is protecting her airway, tolerating her secretions, has no trismus or stridor, lungs are clear to auscultation bilaterally.  She does endorse voice changes.  Patient given IV analgesics and ENT consulted.    I reviewed CT soft tissue neck from earlier today at outside hospital with large left thyroid lobe mass with rightward deviation of aerodigestive tract and leftward deviation of carotid space structures, mild narrowing of trachea at level of thoracic inlet findings consistent with thyroid goiter however superimposed malignancy cannot be excluded.  Patient had labs from outside hospital done at 10 AM which I reviewed.  CMP within normal limits, CRP elevated at 36, TSH within normal limits, leukocytosis of 17.4, hemoglobin 8.8 which is similar to prior, there is a left shift.  Thyroglobulin and TPO antibodies were ordered and are in process.      ED Course:  ED Course as of 09/16/24 2225   Mon Sep 16, 2024   2149 Discussed with ENT who recommends admit to medicine for iv abx. We are thinking this is possibly acute thyroiditis. Consult endo. get t3 and t4 and antithyroid peroxidase and toradol for pain; thyroid labs ordered today at  [facility] [SA]   2150 I initiated Vanc, and toradol as needed for pain, patient has PCN allergy [SA]   2150 Discussed with admission coordinators [SA]      ED Course User Index  [] Marisol Jaquez DO         Diagnoses as of 09/16/24 2225   Neck mass         EKG Interpretation:  See ED Course/Below:    Independent Interpretation of Studies:  I independently interpreted labs/imaging as stated in ED Course or below.    Differential diagnoses considered include but are not limited to: See MDM/Below:    Social Determinants Limiting Care:  None identified The following actions were taken to address these social determinants:      Discussion of Management with Other Providers: See MDM/Below:    Disposition:  Admitted    HAI Tovar, PGY-3    I reviewed the case with the attending ED physician. The attending ED physician agrees with the plan. Patient and/or patient´s representative was counseled regarding labs, imaging, likely diagnosis, and plan. All questions were answered.    Disclaimer: This note was dictated by speech recognition.  Attempt at proofreading was made to minimize errors.  Errors in transcription may be present.  Please call if questions.    Procedures ? SmartLinks last updated 9/16/2024 10:25 PM           Marisol Jaquez DO  Resident  09/16/24 2225

## 2024-09-17 NOTE — ED TRIAGE NOTES
Pt is a transfer from [facility] for an ENT consult. Pt reports being dx with a goiter to the left side of her neck in May with follow up scheduled with ENT  of CT scan and biopsy tomorrow (9/17). Last night pt started having acute pain to the left side of her neck. Pain be came unbearable and pt went to [facility] [place]. Pt also notes that she was recently admitted to [facility] on 9/11 with fever and elevated white count for IV abx. Upon arrival to [facility], pt is A+OX4 c/o 10/10 pain. EMS states last pain meds received was 4mg morphine @ 1800. Pt denies any difficutly with breathing or swallowing.

## 2024-09-17 NOTE — PROGRESS NOTES
"Pharmacy Medication History Review    Radha Duncan is a 43 y.o. female admitted for Neck mass. Pharmacy reviewed the patient's [id number] medications and allergies for accuracy.    Medications ADDED:  Fluticasone(flonase) nasal spray  Medications CHANGED:  none  Medications REMOVED:   none    The list below reflects the updated PTA list.   Prior to Admission Medications   Prescriptions Last Dose Informant   cefdinir (Omnicef) 300 mg capsule 9/15/2024 Self   Sig: Take 1 capsule (300 mg) by mouth twice a day.   cholecalciferol, vitamin D3, (VITAMIN D3 ORAL) Past Week Self   Sig: Take 1 tablet by mouth once daily.   ferrous sulfate 325 (65 Fe) MG EC tablet 9/15/2024 Self   Sig: Take 1 tablet by mouth every other day.   fluticasone (Flonase) 50 mcg/actuation nasal spray  Self   Sig: Administer 1 spray into each nostril once daily. Shake gently. Before first use, prime pump. After use, clean tip and replace cap.   metroNIDAZOLE (Flagyl) 500 mg tablet 9/15/2024 Self   Sig: Take 1 tablet (500 mg) by mouth 3 times a day.   multivitamin tablet Past Week Self   Sig: Take 1 tablet by mouth once daily.   psyllium seed, with sugar, (FIBER ORAL) Past Week Self   Sig: Take 1 Dose by mouth once daily as needed.      Facility-Administered Medications: None        The list below reflects the updated allergy list. Please review each documented allergy for additional clarification and justification.  Allergies  Reviewed by Jasmin Matamoros on 9/17/2024        Severity Reactions Comments    Penicillins Low Hives, Swelling Eye swelling            Patient accepts M2B at discharge.     Sources:   -Patient interview  -Outpatient pharmacy dispense history  -OARRS    Additional Comments:  Cefdiner and metroNIDAZOLE were filled on 9/12/24 for 7 day supply, patient took last dose on [place] 9/15/24      JASMIN MATAMOROS  Pharmacy Technician  09/17/24     Secure Chat preferred   If no response call [phone] or [place] \"Med " "Rec\"    "

## 2024-09-17 NOTE — HOSPITAL COURSE
Patient presented in [facility] ED as transfer from [facility] ED after presenting for severe neck stiffness and pain, thyroid goiter swelling, voice hoarseness, and headache. Patient presented in ED with hypertension, tachycardia, afebrile. Patient was given IV dilaudid 0.5, toradol 15 mg, percocet 5 mg, morphine 4 mg for pain control. CBC showed leukocytosis, Hgb of 9.9, MCV 69. TSH initially 0.67 in ED, normal free T4, TPO WNL. Patient was given IV vancomycin due to concern for infection due to leukocytosis. Blood cultures x2 drawn. CT neck w/IV showed large left thyroid lobe mas measuring 7.0 x 6.3 x 10.5 cm resulting in significant rightward deviation of aerodigestive tract and leftward deviation of carotid space structures, mild narrowing of trachea. Repeat TSH was low. Patient was switched to levofloxacin 750 daily for 5 days. Patient started receiving oral iron. ENT consulted for recs - evaluated patient, determined not at risk for airway compromise and patient should get biopsy done outpatient. Patient's pain regimen was increased on day 2 of admission due to complaints of severe pain. Labs on 9/18 showed decreased WBCs to 11.0. Endo consulted to help determine etiology of mixed clinical picture, recs addition of naproxen and repeat labs which showed normal TSH, free T4, free T3 and very high thyroglobulin. Pain regimen was increased on day 3 of admission due to continued pain and neck tenderness. IV dexamethasone 4 mg was started on day 3 of admission. Patient began to have few episodes of watery diarrhea that night. Patient's neck pain and swelling improved on day 4 of admission.    Patients pain/swelling worsened over the weekend back to level prior to steroids. Ultimately, was decided pain prevented discharge and outpatient Ent follow up. On 9/23/24, was decided to move forward with IR guided biopsy of neck mass. Patient did well overnight without needing Iv pain medication.    The following day the patient's  neck mass was biopsied via IR and patient had no complications. Progressed through the night without getting IV pain medication. On 9/25 patient was stable and ready for discharge. Patient sent home with 5 days of 5mg oxy q6h PRN for pain control and told to contact PCP or ENT if needing further pain control. Steroids were stopped at discharge.

## 2024-09-17 NOTE — PROGRESS NOTES
Daily Progress Note    Radha Duncan is a 43 y.o. female on day 1 of admission presenting with Neck mass.    Subjective   Radha Duncan is a 43 y.o. female with PMHx of L thyroid mass, fibroids, and anxiety/depression who presents as a transfer from [facility] ED for ENT evaluation. Patient seen and examined in the ED. The patient is complaining of neck pain/stiffness, L sided neck tenderness, voice hoarseness, fatigue, and headache. Endorses pain with ROM of neck since Friday, voice changes since Saturday. States she feels she is speaking at lower volume than normal. ENT consulted for further work-up as patient was initially set to have outpatient biopsy done today 9/17.    Patient denies dysphagia, difficulty breathing, wheezing, or stridor. Denies CP/SOB, N/V/D, heart palpitations, LE swelling, abdominal pain, fever, chills.     Objective   Vitals: I/O:   Vitals:    09/17/24 1100   BP: 126/74   Pulse: 78   Resp: 18   Temp:    SpO2: 99%        Temp  Min: 37 °C (98.6 °F)  Max: 37.7 °C (99.9 °F)  Pulse  Min: 78  Max: 111  BP  Min: 126/74  Max: 185/110  Resp  Min: 18  Max: 18  SpO2  Min: 96 %  Max: 100 %   Intake/Output Summary (Last 24 hours) at 9/17/2024 1123  Last data filed at 9/16/2024 2325  Gross per 24 hour   Intake 200 ml   Output --   Net 200 ml        Net IO Since Admission: 200 mL [09/17/24 1123]      Physical Exam  Constitutional:       General: She is not in acute distress.     Appearance: Normal appearance.   HENT:      Head: Normocephalic and atraumatic.   Neck:      Thyroid: Thyroid mass and thyroid tenderness present.      Comments: L anterior neck mass, very tender on palpation, warm and firm/fixed.  Cardiovascular:      Rate and Rhythm: Normal rate and regular rhythm.      Pulses: Normal pulses.      Heart sounds: Normal heart sounds. No murmur heard.     No gallop.   Pulmonary:      Effort: Pulmonary effort is normal. No respiratory distress.      Breath sounds: Normal breath sounds.  No stridor. No wheezing.   Abdominal:      General: Abdomen is flat.      Palpations: Abdomen is soft.      Tenderness: There is no abdominal tenderness.      Comments: Firmness palpated in suprapubic area bilaterally.   Musculoskeletal:      Cervical back: Pain with movement present.      Right lower leg: No edema.      Left lower leg: No edema.   Skin:     General: Skin is warm and dry.      Findings: No rash.   Neurological:      Mental Status: She is alert and oriented to person, place, and time.         Medications:  Scheduled Medications PRN Medications   acetaminophen, 975 mg, oral, TID  ferrous sulfate (325 mg ferrous sulfate), 65 mg of iron, oral, Daily  levoFLOXacin, 750 mg, intravenous, q24h  polyethylene glycol, 17 g, oral, Daily      PRN medications: HYDROmorphone, oxyCODONE      Continuous medications       Relevant Results:  Results from last 7 days   Lab Units 09/16/24  1008   WBC AUTO x10*3/uL 17.4*   HEMOGLOBIN g/dL 8.8*   HEMATOCRIT % 30.3*   PLATELETS AUTO x10*3/uL 411            Results from last 7 days   Lab Units 09/16/24  1008   SODIUM mmol/L 136   POTASSIUM mmol/L 3.5   CHLORIDE mmol/L 101   CO2 mmol/L 24   BUN mg/dL 6   CREATININE mg/dL 0.58   CALCIUM mg/dL 8.7     Results from last 7 days   Lab Units 09/16/24  1008   ALK PHOS U/L 74   BILIRUBIN TOTAL mg/dL 0.6   BILIRUBIN DIRECT mg/dL 0.2   PROTEIN TOTAL g/dL 7.8   ALT U/L 16   AST U/L 25           No orders to display        Assessment/Plan   Radha CraigShariMaricarmen is a 43 y.o. female with PMHx of L thyroid mass, fibroids, and anxiety/depression who presents as a transfer from [facility] ED for ENT evaluation for L anterior neck mass. CT neck showed large L thyroid mass with compressive symptoms. TSH 0.67, repeat TSH 0.26 w/T4 1.29 N, TPO <28 WNL, thyroglobulin ordered (>3000 in 2019.). Ddx include subacute thyroiditis due to presenting sx of significant neck/tenderness, low TSH, elevated CRP & ESR, leukocytosis, and mild anemia in setting  of 4-5 month hx of goiter. Based on CT scan showing significant swelling of goiter and encroachment on trachea, there is concern for progression to airway compromise. ENT consult to evaluate best route possible in terms of removal of mass vs biopsy and scope to determine underlying cause.    Updates (09/17/24):  ENT consult, appreciate recs  Consider endo consult given likely mixed clinical picture of thyroiditis and chronic goiter  Patient receiving IV levofloxacin 750 for c/f infection and oral iron 325 mg for anemia  Discussed with patient concern for compression of airway; alert team for signs of respiratory distress, wheezing, or stridor    #L thyroid mass  :: Hx of goiter neck mass since May 2024, being evaluated by ENT outpatient  :: TSH slightly low 0.26, ESR and CRP elevated, thyroglobulin pending, WBC high  :: CT scan shows Large left thyroid lobe mass measuring 7.0 x 6.3 x 10.5 cm resulting  in significant rightward deviation of the aerodigestive tract and leftward deviation of the carotid space structures, mild narrowing of trachea at level of thoracic inlet; Findings are most consistent with thyroid goiter, but superimposed malignancy  within the goiter mass is not excluded.  - ENT consulted in ED, follow up recs for intervention  - Pain control: tylenol 975 mg PO TID, oxy 5 mg q6h prn; IV dilaudid 0.4 mg PRN for breakthrough     #Leukocytosis WBC 17.4 (WBC 4.8 on 9/3/24), unclear etiology  #C/f subacute thyroiditis  :: S/p vancomycin in ED; S/p course of cefdinir and flagyl OP x 5 days; Persistent leukocytosis despite abx therapy; WBC normal earlier this month  :: Blood cultures x2 pending  :: UA showed negative leukocyte esterase and nitrites, 1+ blood, 2+ protein (hx of fibroids)  :: Free thyroxine WNL, TPO antibody normal  :: Allergy to PCN (hives)  - Levofloxacin 750 daily x 5d  - ENT consult, appreciate recs     #Microcytic Anemia  :: Hb 8.8 MCV 69. Was 7.8 previously.  - LYNNE workup, patient  receiving oral iron  - Trend CBC      IVF: PRN  Electrolytes: K>4, P>3, Mg>2  Access: PIV    Diet: NPO Diet Except: Sips with meds; Effective midnight   DVT Ppx: waiting to hear ENT evaluation based on intervention  GI Ppx: Not indicated  Bowel regimen: Miralax  Pain management: tylenol 975 mg TID, oxycodone 5mg q6H PRN, dilaudid injection 0.4mg q4H PRN    Extended Emergency Contact Information  Primary Emergency Contact: Adan Hernadez  Home Phone: [phone]  Relation: Spouse  Secondary Emergency Contact: Meg Jorgensen   [facility]  Home Phone: [phone]  Relation: None   Code status: Full Code (confirmed on admission)  PT/OT: [ordered, as able]  Disposition: discharge to home when medically ready    BARB GORDILLO, MS3

## 2024-09-17 NOTE — H&P
MEDICINE ADMISSION NOTE    History of Present Illness  Radha Duncan is a 43 y.o. female with PMHx of L thyroid mass, fibroids, and anxiety/depression who presents as a transfer from [facility] ED for ENT evaluation. Patient was initially seen by outpatient ENT on 9/3/2024 after she presented to urgent care for sinusitis and incidentally found to have L neck mass, possibly goiter back in May 2024. She was instructed to follow up with PCP, underwent US thyroid that showed left sided TI-RADS category 3 thyroid nodule measuring 8.7 cm and another smaller TI-RADS category 3 thyroid nodule measuring 3.0 cm. Both dominant nodules meet the criteria for tissue sampling. Outpatient ENT ordered CT neck on 9/3 but this was not completed. She was scheduled for thyroid biopsy with ENT for 9/17/24. Then patient presented to [facility] ED on 9/11 with neck pain and abdominal pain associated with fever, chills, and fatigue. She was found to leukocytosis, TVUS demonstrated malpositioned IUD in the cervix + uterine fibroids largest 11.1 x 11.7 x 8.0 FIGO stage 4 posterior fundal. CT A/P with no acute findings other than fibroid as noted on TVUS. She denied h/o STI or current vaginitis symptoms per note. Patient deferred pelvic exam. Per note, they discussed removing patient's malpositioned IUD, which she deferred and for which she would like to f/u outpatient to her primary obgyn (Dr. Walker). Patient was discharged on cefdinir and Flagyl on 9/12. She reports she completed course of antibiotics.     Regarding L neck mass, patient reports she started having neck pain on 9/13, worsened with turning neck, associated with warmth and tenderness, mild odynophagia, and dysphonia. She denies dyspnea, wheezing, stridor, or dysphagia. She is unsure if mass has grown in size overtime. She denies history of radiation to neck. She denies family hx of thyroid cancer. Prior TSH WNL. Prior TPO WNL. Thyroglobulin in 2019 elevated >3000. There is some L  ear fullness/discomfort. She reports no more fevers or chills. She had 150 lb intentional weight loss in 2020.     She currently denies chest pain, abdominal pain, N/V/C/D, lower extremity pain/swelling.    ED Course:  V/S: /102; ; RR 18; Afebrile  WBC 17.4 (ANC 13.79 H). Hb 8.8 MCV 69.   ESR >130 CRP 36.83 H  TSH 0.67, repeat TSH 0.26 w/T4 1.29 N, TPO <28 WNL, thyroglobulin ordered  CT neck w/IV: Large left thyroid lobe mass measuring 7.0 x 6.3 x 10.5 cm resulting in significant rightward deviation of the aerodigestive tract as well as leftward deviation of the carotid space structures. There is mild narrowing of the trachea at the level of thoracic inlet. Findings are most consistent with thyroid goiter; however, superimposed malignancy within the goiter mass is not excluded based on imaging alone.   Interventions:  ml, vancomycin, Toradol 15 mg, percocet 5, morphine 4 mg, IV dilaudid 0.5    ED Interventions:  Medications   vancomycin (Vancocin) 1,000 mg in dextrose 5%  mL (1,000 mg intravenous New Bag 9/16/24 2324)   HYDROmorphone (Dilaudid) injection 0.5 mg (0.5 mg intravenous Given 9/16/24 2129)       Labs:  Results from last 7 days   Lab Units 09/16/24  1008   WBC AUTO x10*3/uL 17.4*   HEMOGLOBIN g/dL 8.8*   HEMATOCRIT % 30.3*   PLATELETS AUTO x10*3/uL 411            Results from last 7 days   Lab Units 09/16/24  1008   SODIUM mmol/L 136   POTASSIUM mmol/L 3.5   CHLORIDE mmol/L 101   CO2 mmol/L 24   BUN mg/dL 6   CREATININE mg/dL 0.58   CALCIUM mg/dL 8.7     Results from last 7 days   Lab Units 09/16/24  1008   ALK PHOS U/L 74   BILIRUBIN TOTAL mg/dL 0.6   BILIRUBIN DIRECT mg/dL 0.2   PROTEIN TOTAL g/dL 7.8   ALT U/L 16   AST U/L 25                    Past Medical History  No past medical history on file.    Surgical History  No past surgical history on file.    Social History  She reports that she has never smoked. She has never used smokeless tobacco. She reports current alcohol use  of about 1.0 standard drink of alcohol per week. She reports that she does not use drugs.    Family History  Family History   Problem Relation Name Age of Onset    Hypertension Mother      Hypertension Mother's Sister      Breast cancer Mother's Sister      Malig Hypertension Mother's Brother          Allergies  Penicillins     Physical Exam   Constitutional: Well-developed female in no acute distress, lying in bed with ice pack to neck  HEENT: NC/AT. MMM. L anterior neck mass, tender to touch, warm, solid and firm/fixed  Respiratory: CTA bilaterally. No wheezes, rales, or rhonchi. Normal respiratory effort.  Cardiovascular: RRR. No murmurs, gallops, or rubs. No JVD. Radial pulses 2+.  Abdominal: Soft, nondistended, nontender to palpation. Bowel sounds present. No hepatosplenomegaly or masses. No CVA tenderness.  Neuro: AAOx4. Cooperative.  MSK: No LE edema bilaterally.  Skin: Warm, dry. No rashes or wounds.  Psych: Appropriate mood and affect.      Medications  Scheduled medications  HYDROmorphone, 0.5 mg, intravenous, Once  vancomycin, 1,000 mg, intravenous, q1h      Continuous medications     PRN medications       === 08/19/24 ===    US THYROID    - Impression -  1. Left sided TI-RADS category 3 thyroid nodule measuring 8.7 cm and  another smaller TI-RADS category 3 thyroid nodule measuring 3.0 cm.  Both dominant nodules meet the criteria for tissue sampling.    MACRO:  None    Signed by: Swapnli Banegas 8/20/2024 6:06 PM  Dictation workstation:   [id number]  === 09/16/24 ===    CT SOFT TISSUE NECK W IV CONTRAST    - Impression -  Large left thyroid lobe mass measuring 7.0 x 6.3 x 10.5 cm resulting  in significant rightward deviation of the aerodigestive tract as well  as leftward deviation of the carotid space structures. There is mild  narrowing of the trachea at the level of thoracic inlet. Findings are  most consistent with thyroid goiter; however, superimposed malignancy  within the goiter mass is not  excluded based on imaging alone.    I personally reviewed the images/study and I agree with the findings  as stated. This study was interpreted at [facility], [place], [place].    MACRO:  None    Signed by: Aby Mcgarry 9/16/2024 2:00 PM  Dictation workstation:   [id number]     Assessment/Plan     Radha Duncan is a 43 y.o. female with PMHx of L thyroid mass, fibroids, and anxiety/depression who presents as a transfer from [facility] ED for ENT evaluation for L anterior neck mass. CT neck showed large L thyroid mass with compressive symptoms. TSH 0.67, repeat TSH 0.26 w/T4 1.29 N, TPO <28 WNL, thyroglobulin ordered (>3000 in 2019.). Ddx include nontoxic benign goiter vs thyroid carcinoma. Plan for scope w/biopsy with ENT tomorrow.    #L thyroid mass  PLAN  -ENT consulted in ED, follow up recs. Plan for potential scope w/bx tmrw 9/17  -Pain control: oxy 5 mg q6h prn; IV dilaudid 0.4 mg PRN for breakthrough    #Leukocytosis WBC 17.4 (WBC 4.8 on 9/3/24), unclear etiology  #C/f acute L earache iso thyroid compression  S/p vancomycin in ED. S/p course of cefdinir and flagyl OP x 5 days. Persistent leukocytosis despite abx therapy. WBC normal earlier this month. Complains of L earache, acute. Allergy to PCN (hives). Start levofloxacin.  PLAN  -levofloxacin 750 daily x 5d  -obtain blood cx  -obtain UA    #Normocytic Anemia  Hb 8.8 MCV 69. Was 7.8 previously.  PLAN  -LYNNE workup  -Trend CBC      F: PRN  E: replete K  N: NPO   A: PIV  DVT: hold chemical for procedure; SCDs  GI: none    Bowel Regimen:  Code Status: Full code (confirmed on admission)     Primary Emergency Contact: Adan Hernadez, Home Phone: [phone]               Not yet staffed with the attending.  Stanton Landrum MD  PGY-2 Internal Medicine

## 2024-09-18 LAB
ALBUMIN SERPL BCP-MCNC: 3.3 G/DL (ref 3.4–5)
ALP SERPL-CCNC: 65 U/L (ref 33–110)
ALT SERPL W P-5'-P-CCNC: 8 U/L (ref 7–45)
ANION GAP SERPL CALC-SCNC: 13 MMOL/L (ref 10–20)
AST SERPL W P-5'-P-CCNC: 11 U/L (ref 9–39)
BACTERIA UR CULT: NORMAL
BASOPHILS # BLD AUTO: 0.04 X10*3/UL (ref 0–0.1)
BASOPHILS NFR BLD AUTO: 0.4 %
BILIRUB DIRECT SERPL-MCNC: 0.2 MG/DL (ref 0–0.3)
BILIRUB SERPL-MCNC: 0.6 MG/DL (ref 0–1.2)
BILL ONLY-THYROGLOBULIN: NORMAL
BUN SERPL-MCNC: 5 MG/DL (ref 6–23)
CALCIUM SERPL-MCNC: 9.1 MG/DL (ref 8.6–10.6)
CHLORIDE SERPL-SCNC: 101 MMOL/L (ref 98–107)
CO2 SERPL-SCNC: 28 MMOL/L (ref 21–32)
CREAT SERPL-MCNC: 0.61 MG/DL (ref 0.5–1.05)
EGFRCR SERPLBLD CKD-EPI 2021: >90 ML/MIN/1.73M*2
EOSINOPHIL # BLD AUTO: 0.03 X10*3/UL (ref 0–0.7)
EOSINOPHIL NFR BLD AUTO: 0.3 %
ERYTHROCYTE [DISTWIDTH] IN BLOOD BY AUTOMATED COUNT: 21.4 % (ref 11.5–14.5)
GLUCOSE SERPL-MCNC: 108 MG/DL (ref 74–99)
HCT VFR BLD AUTO: 27.9 % (ref 36–46)
HGB BLD-MCNC: 7.9 G/DL (ref 12–16)
HYPOCHROMIA BLD QL SMEAR: NORMAL
IMM GRANULOCYTES # BLD AUTO: 0.35 X10*3/UL (ref 0–0.7)
IMM GRANULOCYTES NFR BLD AUTO: 3.2 % (ref 0–0.9)
LYMPHOCYTES # BLD AUTO: 1.89 X10*3/UL (ref 1.2–4.8)
LYMPHOCYTES NFR BLD AUTO: 17.2 %
MAGNESIUM SERPL-MCNC: 1.94 MG/DL (ref 1.6–2.4)
MCH RBC QN AUTO: 19.9 PG (ref 26–34)
MCHC RBC AUTO-ENTMCNC: 28.3 G/DL (ref 32–36)
MCV RBC AUTO: 71 FL (ref 80–100)
MONOCYTES # BLD AUTO: 1.21 X10*3/UL (ref 0.1–1)
MONOCYTES NFR BLD AUTO: 11 %
NEUTROPHILS # BLD AUTO: 7.44 X10*3/UL (ref 1.2–7.7)
NEUTROPHILS NFR BLD AUTO: 67.9 %
NRBC BLD-RTO: 0 /100 WBCS (ref 0–0)
PHOSPHATE SERPL-MCNC: 3.3 MG/DL (ref 2.5–4.9)
PLATELET # BLD AUTO: 366 X10*3/UL (ref 150–450)
POTASSIUM SERPL-SCNC: 3.4 MMOL/L (ref 3.5–5.3)
PROT SERPL-MCNC: 7.4 G/DL (ref 6.4–8.2)
RBC # BLD AUTO: 3.96 X10*6/UL (ref 4–5.2)
RBC MORPH BLD: NORMAL
ROULEAUX BLD QL SMEAR: PRESENT
SODIUM SERPL-SCNC: 139 MMOL/L (ref 136–145)
STAPHYLOCOCCUS SPEC CULT: NORMAL
T3FREE SERPL-MCNC: 2.6 PG/ML (ref 2.3–4.2)
T4 FREE SERPL-MCNC: 1.31 NG/DL (ref 0.78–1.48)
T4 FREE SERPL-MCNC: 1.33 NG/DL (ref 0.78–1.48)
THYROGLOB AB SERPL-ACNC: <0.9 IU/ML (ref 0–4)
THYROGLOB SERPL-MCNC: ABNORMAL NG/ML (ref 1.3–31.8)
THYROGLOB SERPL-MCNC: ABNORMAL NG/ML (ref 1.3–31.8)
TSH SERPL-ACNC: 0.26 MIU/L (ref 0.44–3.98)
TSH SERPL-ACNC: 1.3 MIU/L (ref 0.44–3.98)
WBC # BLD AUTO: 11 X10*3/UL (ref 4.4–11.3)

## 2024-09-18 PROCEDURE — 84075 ASSAY ALKALINE PHOSPHATASE: CPT

## 2024-09-18 PROCEDURE — 2500000001 HC RX 250 WO HCPCS SELF ADMINISTERED DRUGS (ALT 637 FOR MEDICARE OP)

## 2024-09-18 PROCEDURE — 99233 SBSQ HOSP IP/OBS HIGH 50: CPT | Performed by: STUDENT IN AN ORGANIZED HEALTH CARE EDUCATION/TRAINING PROGRAM

## 2024-09-18 PROCEDURE — 84439 ASSAY OF FREE THYROXINE: CPT

## 2024-09-18 PROCEDURE — 99223 1ST HOSP IP/OBS HIGH 75: CPT | Performed by: INTERNAL MEDICINE

## 2024-09-18 PROCEDURE — 2500000004 HC RX 250 GENERAL PHARMACY W/ HCPCS (ALT 636 FOR OP/ED)

## 2024-09-18 PROCEDURE — 36415 COLL VENOUS BLD VENIPUNCTURE: CPT

## 2024-09-18 PROCEDURE — 84445 ASSAY OF TSI GLOBULIN: CPT

## 2024-09-18 PROCEDURE — 1100000001 HC PRIVATE ROOM DAILY

## 2024-09-18 PROCEDURE — 84443 ASSAY THYROID STIM HORMONE: CPT | Performed by: STUDENT IN AN ORGANIZED HEALTH CARE EDUCATION/TRAINING PROGRAM

## 2024-09-18 PROCEDURE — 85025 COMPLETE CBC W/AUTO DIFF WBC: CPT

## 2024-09-18 PROCEDURE — 83735 ASSAY OF MAGNESIUM: CPT

## 2024-09-18 PROCEDURE — 84100 ASSAY OF PHOSPHORUS: CPT

## 2024-09-18 PROCEDURE — 2500000002 HC RX 250 W HCPCS SELF ADMINISTERED DRUGS (ALT 637 FOR MEDICARE OP, ALT 636 FOR OP/ED)

## 2024-09-18 PROCEDURE — 84481 FREE ASSAY (FT-3): CPT

## 2024-09-18 RX ORDER — OXYCODONE HYDROCHLORIDE 10 MG/1
10 TABLET ORAL EVERY 6 HOURS PRN
Status: DISCONTINUED | OUTPATIENT
Start: 2024-09-18 | End: 2024-09-19

## 2024-09-18 RX ORDER — ONDANSETRON HYDROCHLORIDE 2 MG/ML
4 INJECTION, SOLUTION INTRAVENOUS ONCE
Status: DISPENSED | OUTPATIENT
Start: 2024-09-18

## 2024-09-18 RX ORDER — KETOROLAC TROMETHAMINE 30 MG/ML
30 INJECTION, SOLUTION INTRAMUSCULAR; INTRAVENOUS EVERY 6 HOURS PRN
Status: DISCONTINUED | OUTPATIENT
Start: 2024-09-18 | End: 2024-09-19

## 2024-09-18 RX ORDER — POTASSIUM CHLORIDE 20 MEQ/1
40 TABLET, EXTENDED RELEASE ORAL ONCE
Status: COMPLETED | OUTPATIENT
Start: 2024-09-18 | End: 2024-09-18

## 2024-09-18 RX ORDER — NAPROXEN 500 MG/1
500 TABLET ORAL
Status: DISCONTINUED | OUTPATIENT
Start: 2024-09-18 | End: 2024-09-19

## 2024-09-18 RX ORDER — LANOLIN ALCOHOL/MO/W.PET/CERES
400 CREAM (GRAM) TOPICAL DAILY
Status: DISCONTINUED | OUTPATIENT
Start: 2024-09-18 | End: 2024-09-21

## 2024-09-18 RX ORDER — MAGNESIUM SULFATE HEPTAHYDRATE 40 MG/ML
2 INJECTION, SOLUTION INTRAVENOUS ONCE
Status: DISCONTINUED | OUTPATIENT
Start: 2024-09-18 | End: 2024-09-18

## 2024-09-18 ASSESSMENT — PAIN SCALES - GENERAL
PAINLEVEL_OUTOF10: 8
PAINLEVEL_OUTOF10: 10 - WORST POSSIBLE PAIN
PAINLEVEL_OUTOF10: 8
PAINLEVEL_OUTOF10: 8
PAINLEVEL_OUTOF10: 10 - WORST POSSIBLE PAIN
PAINLEVEL_OUTOF10: 8
PAINLEVEL_OUTOF10: 10 - WORST POSSIBLE PAIN
PAINLEVEL_OUTOF10: 7

## 2024-09-18 ASSESSMENT — PAIN DESCRIPTION - DESCRIPTORS
DESCRIPTORS: ACHING;DISCOMFORT

## 2024-09-18 ASSESSMENT — COGNITIVE AND FUNCTIONAL STATUS - GENERAL
DAILY ACTIVITIY SCORE: 24
MOBILITY SCORE: 24

## 2024-09-18 ASSESSMENT — PAIN - FUNCTIONAL ASSESSMENT
PAIN_FUNCTIONAL_ASSESSMENT: 0-10

## 2024-09-18 ASSESSMENT — PAIN DESCRIPTION - LOCATION: LOCATION: NECK

## 2024-09-18 ASSESSMENT — ACTIVITIES OF DAILY LIVING (ADL): LACK_OF_TRANSPORTATION: NO

## 2024-09-18 NOTE — PROGRESS NOTES
Daily Progress Note    Radha Duncan is a 43 y.o. female on day 2 of admission presenting with Neck mass.    Subjective   No acute overnight events. Patient seen and examined at bedside. Patient complains of continued pain throughout the night, states severity is 8.5/10. We discussed with patient plan of treatment based on infectious vs inflammatory etiology. Patient and family continually asking if biopsy can still be done. Patient endorses continued tenderness of neck, L>R. Denies difficulty breathing or swallowing.     Patient does endorse night sweats and chills. Denies dizziness, lightheadedness, falls. Patient denies CP/SOB, N/V/D, abdominal pain, LE swelling.     Objective   Vitals: I/O:   Vitals:    09/18/24 0404   BP: 131/81   Pulse: 103   Resp: 18   Temp: 36.3 °C (97.3 °F)   SpO2: 97%        Temp  Min: 36.3 °C (97.3 °F)  Max: 36.6 °C (97.9 °F)  Pulse  Min: 78  Max: 103  BP  Min: 126/76  Max: 152/97  Resp  Min: 16  Max: 20  SpO2  Min: 97 %  Max: 100 %   Intake/Output Summary (Last 24 hours) at 9/18/2024 0708  Last data filed at 9/18/2024 0204  Gross per 24 hour   Intake 150 ml   Output --   Net 150 ml        Net IO Since Admission: 500 mL [09/18/24 0708]      Physical Exam  Constitutional:       General: She is not in acute distress.     Appearance: Normal appearance.   HENT:      Head: Normocephalic and atraumatic.   Neck:      Comments: Moderate swelling present in neck, L>R, consistent with known thyroid goiter, without evidence of significant erythema. No cervical lymphadenopathy. Tenderness to palpation.  Cardiovascular:      Rate and Rhythm: Normal rate and regular rhythm.      Heart sounds: Normal heart sounds.   Pulmonary:      Effort: Pulmonary effort is normal.      Breath sounds: Normal breath sounds.   Abdominal:      General: Abdomen is flat.      Palpations: Abdomen is soft.      Tenderness: There is no abdominal tenderness.   Musculoskeletal:         General: No swelling.       Cervical back: Tenderness present.   Skin:     General: Skin is warm and dry.   Neurological:      Mental Status: She is alert.   Psychiatric:         Mood and Affect: Mood normal.         Behavior: Behavior normal.         Medications:  Scheduled Medications PRN Medications   acetaminophen, 975 mg, oral, TID  ferrous sulfate (325 mg ferrous sulfate), 65 mg of iron, oral, Daily  levoFLOXacin, 750 mg, intravenous, q24h  polyethylene glycol, 17 g, oral, Daily      PRN medications: HYDROmorphone, oxyCODONE      Continuous medications       Relevant Results:  Results from last 7 days   Lab Units 09/16/24  1008   WBC AUTO x10*3/uL 17.4*   HEMOGLOBIN g/dL 8.8*   HEMATOCRIT % 30.3*   PLATELETS AUTO x10*3/uL 411            Results from last 7 days   Lab Units 09/16/24  1008   SODIUM mmol/L 136   POTASSIUM mmol/L 3.5   CHLORIDE mmol/L 101   CO2 mmol/L 24   BUN mg/dL 6   CREATININE mg/dL 0.58   CALCIUM mg/dL 8.7     Results from last 7 days   Lab Units 09/16/24  1008   ALK PHOS U/L 74   BILIRUBIN TOTAL mg/dL 0.6   BILIRUBIN DIRECT mg/dL 0.2   PROTEIN TOTAL g/dL 7.8   ALT U/L 16   AST U/L 25           No orders to display        Assessment/Plan   Radha Duncan is a 43 y.o. female with PMHx of L thyroid mass, fibroids, and anxiety/depression who presents as a transfer from [facility] ED for ENT evaluation for L anterior neck mass. CT neck showed large L thyroid mass with compressive symptoms. TSH 0.67, repeat TSH 0.26 w/T4 1.29 N, TPO <28 WNL, thyroglobulin pending (>3500 on 09/03.). Ddx include subacute thyroiditis due to presenting sx of significant neck/tenderness, low TSH, elevated CRP & ESR, leukocytosis, and mild anemia in setting of 4-5 month hx of goiter. Based on CT scan showing significant swelling of goiter and encroachment on trachea, there is concern for progression to airway compromise. ENT consulted to evaluate best route possible in terms of removal of mass vs biopsy and scope to determine underlying  cause. WBCs decreased on day 2 of admission to 11.0, possibly indicating improvement of infection due to antibiotics. However, thyroiditis does not seem to be improving based on patient's continued severe pain. This may indicate thyroiditis is not infectious in nature, but etiology of inflammatory process is unclear.    Updates (09/18/24):  Patient receiving IV levofloxacin 750 for c/f infection and oral iron 325 mg for anemia  Patient complaining of severe pain - can increase regimen to oxy 10 q6H, toradol 30 mg PRN  ENT continuing to follow, appreciate recs  Endo consult given mixed clinical picture of thyroiditis and chronic goiter  Discussed with patient concern for compression of airway; alert team for signs of respiratory distress, wheezing, or stridor    #L thyroid mass  :: Hx of goiter neck mass since May 2024, being evaluated by ENT outpatient  :: TSH slightly low 0.26, ESR and CRP elevated, thyroglobulin pending, WBC high  :: CT scan shows Large left thyroid lobe mass measuring 7.0 x 6.3 x 10.5 cm resulting  in significant rightward deviation of the aerodigestive tract and leftward deviation of the carotid space structures, mild narrowing of trachea at level of thoracic inlet; Findings are most consistent with thyroid goiter, but superimposed malignancy  within the goiter mass is not excluded.  - ENT consulted - recs patient be evaluated outpatient for biopsy and treated symptomatically  - Endo consult  - Pain control: tylenol 975 mg PO TID, oxy 10 mg q6h prn; IV dilaudid 0.4 mg, toradol 30 mg PRN     #Leukocytosis WBC 17.4 on 9/17 (WBC 4.8 on 9/3/24), unclear etiology  #C/f subacute thyroiditis  :: S/p vancomycin in ED; S/p course of cefdinir and flagyl OP x 5 days; Persistent leukocytosis despite abx therapy; WBC normal earlier this month  :: Blood cultures x2 NGTD  :: UA showed negative leukocyte esterase and nitrites, 1+ blood, 2+ protein (hx of fibroids)  :: Free thyroxine WNL, TPO antibody normal  ::  WBCs reduced to 11.0, WNL  - Levofloxacin 750 daily x 5d  - ENT consult, appreciate recs     #Microcytic Anemia  :: Hb 8.8 MCV 69. Was 7.8 previously.  - LYNNE workup, patient receiving oral iron  - Trend CBC      IVF: PRN  Electrolytes: K>4, P>3, Mg>2  Access: PIV    Diet: NPO Diet Except: Sips with meds; Effective midnight   DVT Ppx: waiting to hear ENT evaluation based on intervention  GI Ppx: Not indicated  Bowel regimen: Miralax  Pain management: tylenol 975 mg TID, oxycodone 5mg q6H PRN, dilaudid injection 0.4mg q4H PRN    Extended Emergency Contact Information  Primary Emergency Contact: Adan Hernadez  Home Phone: [phone]  Relation: Spouse  Secondary Emergency Contact: Meg Jorgensen   [facility]  Home Phone: [phone]  Relation: None   Code status: Full Code (confirmed on admission)  PT/OT: [ordered, as able]  Disposition: discharge to home when medically ready    BARB GORDILLO MS3

## 2024-09-18 NOTE — ASSESSMENT & PLAN NOTE
Radha Duncan is a 43 y.o. female with PMHx of Left thyroid mass, Fibroids, and Anxiety/Depression who presented as a transfer from [facility] ED for ENT evaluation. Patient was initially seen by outpatient ENT on 9/3/2024 after she presented to urgent care for sinusitis and incidentally found to have Goiter in May 2024.       Endocrine Background History:   Patient underwent U/S thyroid that showed left sided TI-RADS category 3 thyroid nodule measuring 8.7 cm and another smaller TI-RADS category 3 thyroid nodule measuring 3.0 cm.   Outpatient ENT ordered CT neck on 9/3 but this was not completed. She was scheduled for thyroid biopsy with ENT for 24.     Then patient presented to [facility] ED on  with neck pain and abdominal pain associated with fever, chills, and fatigue. She was found to leukocytosis, TVUS demonstrated malpositioned IUD and fibroids. Patient was discharged on cefdinir and Flagyl on .   In the light of the persistence of symptoms and discomfort, she presents to [facility] on  for ongoing care. Patient reports she started having neck pain on , worsened with turning neck, associated with warmth and tenderness, odynophagia, dysphonia, and inability to lay down flat.   She is unsure if mass has grown in size overtime.   Patient does take MV - does not include Biotin.   She denies history of radiation to neck.   No previous Hx of Malignancy.   She denies family hx of thyroid cancer. However has an aunt with an goiter.   24: IV contrast for CT scan.    Upon initial encounter, 24 pt reported the persistent of neck pain,   Reports tremors only the day she had the fever 1 wk ptp. No palpitations or irritability, no SOB, no n,v, no LE edema, no MSK aches and pain, no skin rashes.   No hx of known autoimmune disease. Patient has endorsed weight loss of 150 pounds since  - exercise and lifestyle modification. No GLP1 R agonists.     Pertinent Imagin/19:   Left sided  TI-RADS category 3 thyroid nodule measuring 8.7 cm and  another smaller TI-RADS category 3 thyroid nodule measuring 3.0 cm.  Both dominant nodules meet the criteria for tissue sampling.    9/16:  Large left thyroid lobe mass measuring 7.0 x 6.3 x 10.5 cm resulting  in significant rightward deviation of the aerodigestive tract as well  as leftward deviation of the carotid space structures. There is mild  narrowing of the trachea at the level of thoracic inlet. Findings are  most consistent with thyroid goiter; however, superimposed malignancy  within the goiter mass is not excluded based on imaging alone.    # Multinodular Goiter with Compressive Symptoms  # Superimposed Thyroiditis - onset suspected on 9/12/24 - in the light of thyroidal tenderness with a precipitant 2 days prior to thyroidal tenderness.

## 2024-09-18 NOTE — PROGRESS NOTES
09/18/24 1045   Discharge Planning   Living Arrangements Spouse/significant other   Support Systems Spouse/significant other   Assistance Needed Patient independent with ADL's   Type of Residence Private residence   Number of Stairs to Enter Residence 5   Number of Stairs Within Residence 0   Do you have animals or pets at home? No   Who is requesting discharge planning? Patient   Home or Post Acute Services None   Expected Discharge Disposition Home   Does the patient need discharge transport arranged? No   RoundTrip coordination needed? No   Financial Resource Strain   How hard is it for you to pay for the very basics like food, housing, medical care, and heating? Not hard   Housing Stability   In the last 12 months, was there a time when you were not able to pay the mortgage or rent on time? N   At any time in the past 12 months, were you homeless or living in a shelter (including now)? N   Transportation Needs   In the past 12 months, has lack of transportation kept you from medical appointments or from getting medications? no   In the past 12 months, has lack of transportation kept you from meetings, work, or from getting things needed for daily living? No     Assessment Note:  Met with patient and Introduced myself as care coordinator and member of the Care Transitions team for discharge planning. Patient lives in a single family home with Spouse, she is independent with ADL's. Pt feels safe at home.   Transportation: Patient drive to appointment will have a ride home at discharge.  Pharmacy: [facility]  DME: None  Previous home care: [facility]  Falls: No recent falls  PCP: New PCP Tylor Singh need to reschedule  Dialysis: None    Transitional Care Coordination Progress Note:  Patient discussed during interdisciplinary rounds.   Team members present: MD & TCC  Plan per Medical/Surgical team: Patient admitted for Neck mass c/s endocrine for possible biopsy   Payor: Viv Murray  Discharge disposition: home no  needs  Potential Barriers: None  ADOD: 9/20    TCC will continue to follow and update the plan as warranted.    ALONDRA ArceN-RN  Transitional Care Coordinator (TCC)  [phone] ext [phone]

## 2024-09-18 NOTE — CONSULTS
Inpatient consult to Endocrinology  Consult performed by: Joe Daniel MD  Consult ordered by: Yadi Galvan DO  Reason for consult: Multinodular Goiter Evaluation  Assessment/Recommendations: Multinodular Goiter Evaluation          Reason For Consult  Evaluation of Multinodular goiter    History Of Present Illness  Radha Duncan is a 43 y.o. female with PMHx of Left thyroid mass, Fibroids, and Anxiety/Depression who presented as a transfer from [facility] ED for ENT evaluation. Endocrine is consulted for Evaluation of Multinodular Goiter.    Endocrine Background History:   Patient was initially seen by outpatient ENT on 9/3/2024 after she presented to urgent care for sinusitis and incidentally found to have Goiter in May 2024.   Patient underwent U/S thyroid that showed left sided TI-RADS category 3 thyroid nodule measuring 8.7 cm and another smaller TI-RADS category 3 thyroid nodule measuring 3.0 cm.   Outpatient ENT ordered CT neck on 9/3 but this was not completed. She was scheduled for thyroid biopsy with ENT for 9/17/24.     Then patient presented to [facility] ED on 9/11 with neck pain and abdominal pain associated with fever, chills, and fatigue. She was found to leukocytosis, TVUS demonstrated malpositioned IUD and fibroids. Patient was discharged on cefdinir and Flagyl on 9/12.   In the light of the persistence of symptoms and discomfort, she presents to [facility] on 9/16 for ongoing care. Patient reports she started having neck pain on 9/13, worsened with turning neck, associated with warmth and tenderness, odynophagia, dysphonia, and inability to lay down flat.   She is unsure if mass has grown in size overtime.   Patient does take MV - does not include Biotin.   She denies history of radiation to neck.   No previous Hx of Malignancy.   No recent Sick Contacts.  She denies family hx of thyroid cancer. However has an aunt with an goiter.   9/16/24: IV contrast for CT scan.    Upon initial encounter with  "our team on  9/18/24 pt reported the persistent of neck pain,   Reports tremors only the day she had the fever 1 wk ptp. No palpitations or irritability, no SOB, no n,v, no LE edema, no MSK aches and pain, no skin rashes.   No hx of known autoimmune disease. Patient has endorsed weight loss of 150 pounds since 2020 - exercise and lifestyle modification. No GLP1 R agonists.   Past Medical History  She has no past medical history on file.    Surgical History  She has no past surgical history on file.     Social History  She reports that she has never smoked. She has never used smokeless tobacco. She reports current alcohol use of about 1.0 standard drink of alcohol per week. She reports that she does not use drugs.    Family History  Family History   Problem Relation Name Age of Onset    Hypertension Mother      Hypertension Mother's Sister      Breast cancer Mother's Sister      Malig Hypertension Mother's Brother          Allergies  Penicillins    Review of Systems As above     Physical Exam  Vitals:    09/18/24 0404   BP: 131/81   Pulse: 103   Resp: 18   Temp: 36.3 °C (97.3 °F)   SpO2: 97%   Constitutional: CCO3   HEENT: Mild proptosis, Globe is soft.  L anterior neck mass, tender to touch, warm, solid.   Respiratory: CTA bilaterally. No wheezes, rales, or rhonchi.   Cardiovascular: RRR. No murmurs, gallops, or rubs. No JVD. Radial pulses 2+.  Abdominal: +BS, Soft, nt, nondistended,    Neuro: AAOx4. Cooperative. No hyperreflexia.   MSK: No LE edema bilaterally.  Skin: Warm  Psych: Appropriate mood and affect  Last Recorded Vitals  Blood pressure 131/81, pulse 103, temperature 36.3 °C (97.3 °F), resp. rate 18, height 1.676 m (5' 6\"), weight 109 kg (240 lb), last menstrual period 09/04/2024, SpO2 97%.    Relevant Results  Results from last 7 days   Lab Units 09/18/24  0743 09/16/24  1008   GLUCOSE mg/dL 108* 96         Current Facility-Administered Medications:     acetaminophen (Tylenol) tablet 975 mg, 975 mg, oral, " TID, Stanton Landrum MD, 975 mg at 09/18/24 0313    ferrous sulfate (325 mg ferrous sulfate) tablet 325 mg, 65 mg of iron, oral, Daily, Stanton Landrum MD, 325 mg at 09/18/24 0845    HYDROmorphone (Dilaudid) injection 0.4 mg, 0.4 mg, intravenous, q4h PRN, Stanton Landrum MD, 0.4 mg at 09/18/24 1157    ketorolac (Toradol) injection 30 mg, 30 mg, intravenous, q6h PRN, Oscar Fuller MD    levoFLOXacin (Levaquin) 750 mg in dextrose 5%  mL, 750 mg, intravenous, q24h, Stanton Landrum MD, Stopped at 09/18/24 0339    magnesium oxide (Mag-Ox) tablet 400 mg, 400 mg, oral, Daily, Oscar Fuller MD, 400 mg at 09/18/24 1023    ondansetron (Zofran) injection 4 mg, 4 mg, intravenous, Once, Oscar Fuller MD    oxyCODONE (Roxicodone) immediate release tablet 10 mg, 10 mg, oral, q6h PRN, Oscar Fuller MD, 10 mg at 09/18/24 1023    polyethylene glycol (Glycolax, Miralax) packet 17 g, 17 g, oral, Daily, Stanton Landrum MD, 17 g at 09/17/24 0806    Pertinent Imaging:  US THYROID;  8/19/2024       INDICATION:  Signs/Symptoms:goiter on exam.      COMPARISON:  None.      ACCESSION NUMBER(S):  [id number]      ORDERING CLINICIAN:  YEIMY OLIVAS      TECHNIQUE:  Multiple ultrasonographic images of the thyroid gland were obtained.      FINDINGS:          RIGHT LOBE:  The right lobe of the thyroid measures 1.5 cm x 1.3 cm x 5.5 cm. The  right lobe of the thyroid is homogeneous in echotexture and  demonstrates no nodules. Multiple subcentimeter nodules.      LEFT LOBE:  The left lobe of the thyroid measures 6.6 cm x 5.6 cm x 11.3 cm. The  left lobe of the thyroid is homogeneous in echotexture.      Nodule 1:  Size: 3.0 x 1.1 x 2.1 cm  Location: Left superior.  Composition: Solid (2)  Echogenicity: Isoechoic (1)  Margins: Smooth (0)  Shape: Wider than taller (0)  Echogenic foci: None (0).      Total points: 3. TI-RADS category: 3.      Nodule 2:  Size: 8.7 x 5.6 x 6.6 cm  Location: Almost complete  left-sided.  Composition: Solid (2)  Echogenicity: Isoechoic (1)  Margins: Smooth (0)  Shape: Wider than taller (0)  Echogenic foci: None (0).      Total points: 3. TI-RADS category: 3.      ISTHMUS:  The isthmus measures approximately 0.4 cm and is homogeneous in  echotexture and without any identifiable nodules.      CERVICAL LYMPH NODES:  None      IMPRESSION:  1. Left sided TI-RADS category 3 thyroid nodule measuring 8.7 cm and  another smaller TI-RADS category 3 thyroid nodule measuring 3.0 cm.  Both dominant nodules meet the criteria for tissue sampling.      Pertinent Imagin/16   Interpreted By:  Aby Mcgarry and Hooper Grayson   STUDY:  CT SOFT TISSUE NECK W IV CONTRAST;  2024 1:00 pm      INDICATION:  Signs/Symptoms:neck pain has massive goiter.          COMPARISON:  Thyroid ultrasound obtained 2024      ACCESSION NUMBER(S):  [id number]      ORDERING CLINICIAN:  ANGÉLICA HARRELL      TECHNIQUE:  Helical CT images of the neck were obtained.  The patient received 75  ML of Omnipaque 350 intravenous contrast agent. The images were  reformatted in angled axial, coronal and sagittal planes.      FINDINGS:  Oral Cavity, Pharynx and Larynx:  Evaluation oral cavity is limited  by streak artifact from dental hardware. Left-to-right midline shift  observed of the glottic and subglottic airway. The nasopharyngeal and  oropharyngeal structures are otherwise unremarkable. The  hypopharyngeal and laryngeal structures are otherwise unremarkable.      Retropharyngeal and Prevertebral Soft Tissues: Retropharyngeal edema  is noted.      Lymph nodes: There are few non specific bilateral neck nodes,  probably reactive in etiology.      Neck vessels: Partial effacement of the left internal jugular vein is  observed by goiter mass. Arterial vascular structures appear intact.  Bilateral neck vessels are normal in course and caliber and appear  patent.      Thyroid gland: There is a 7.0 x 6.3 x 10.5 cm low  attenuating  circumscribed mass centered within the left thyroid lobe, which  causes significant deviation of the glottic and subglottic trachea to  the right. Multiple indwelling punctate calcifications are noted  within this mass. The demonstrated residual left lobe thyroid  parenchyma is heterogeneous with multiple low attenuating nodules.  There is rightward deviation of the trachea and esophagus as well as  lateral deviation of the left-sided carotid space. There is extension  into the suprasternal notch with a small amount of superior  mediastinal extension.      Parotid and submandibular glands: Bilateral parotid and submandibular  glands are unremarkable in appearance.      Paranasal Sinuses and Mastoids: Visualized paranasal sinuses and  bilateral mastoids are clear.      Visualized orbital structures are unremarkable.      Visualized upper lungs are clear.      No acute or aggressive appearing bony abnormalities.      IMPRESSION:  Large left thyroid lobe mass measuring 7.0 x 6.3 x 10.5 cm resulting  in significant rightward deviation of the aerodigestive tract as well  as leftward deviation of the carotid space structures. There is mild  narrowing of the trachea at the level of thoracic inlet. Findings are  most consistent with thyroid goiter; however, superimposed malignancy  within the goiter mass is not excluded based on imaging alone.      Assessment/Plan   Assessment & Plan  Neck mass    Radha Duncan is a 43 y.o. female with PMHx of Left thyroid mass, Fibroids, and Anxiety/Depression who presented as a transfer from [facility] ED for ENT evaluation. Patient was initially seen by outpatient ENT on 9/3/2024 after she presented to urgent care for sinusitis and incidentally found to have Goiter in May 2024.       Endocrine Background History:   Patient underwent U/S thyroid that showed left sided TI-RADS category 3 thyroid nodule measuring 8.7 cm and another smaller TI-RADS category 3 thyroid  nodule measuring 3.0 cm.   Outpatient ENT ordered CT neck on 9/3 but this was not completed. She was scheduled for thyroid biopsy with ENT for 24.     Then patient presented to [facility] ED on  with neck pain and abdominal pain associated with fever, chills, and fatigue. She was found to leukocytosis, TVUS demonstrated malpositioned IUD and fibroids. Patient was discharged on cefdinir and Flagyl on .   In the light of the persistence of symptoms and discomfort, she presents to [facility] on  for ongoing care. Patient reports she started having neck pain on , worsened with turning neck, associated with warmth and tenderness, odynophagia, dysphonia, and inability to lay down flat.   She is unsure if mass has grown in size overtime.   Patient does take MV - does not include Biotin.   She denies history of radiation to neck.   No previous Hx of Malignancy.   She denies family hx of thyroid cancer. However has an aunt with an goiter.   24: IV contrast for CT scan.    Upon initial encounter, 24 pt reported the persistent of neck pain,   Reports tremors only the day she had the fever 1 wk ptp. No palpitations or irritability, no SOB, no n,v, no LE edema, no MSK aches and pain, no skin rashes.   No hx of known autoimmune disease. Patient has endorsed weight loss of 150 pounds since  - exercise and lifestyle modification. No GLP1 R agonists.     Pertinent Imagin/19:   Left sided TI-RADS category 3 thyroid nodule measuring 8.7 cm and  another smaller TI-RADS category 3 thyroid nodule measuring 3.0 cm.  Both dominant nodules meet the criteria for tissue sampling.    :  Large left thyroid lobe mass measuring 7.0 x 6.3 x 10.5 cm resulting  in significant rightward deviation of the aerodigestive tract as well  as leftward deviation of the carotid space structures. There is mild  narrowing of the trachea at the level of thoracic inlet. Findings are  most consistent with thyroid goiter; however,  superimposed malignancy  within the goiter mass is not excluded based on imaging alone.    # Multinodular Goiter with Compressive Symptoms  # Superimposed Thyroiditis - onset suspected on 9/12/24 - in the light of thyroidal tenderness with a precipitant 2 days prior to thyroidal tenderness.     Endocrine Recommendations - 9/18/24:     Patient's thyroid nodule is chronic on imaging from 8/24 to 9/24 -  it appears that has grown in volume by 44 % in the past 2 months. ENT following for this.     As far as her neck pain, thyroiditis could be possible in the light of tenderness to palpation with recent suppressed TSH.     For Today - 9/18/24: Please repeat Free T4 and Free T3 as last panel follows the date of IV contrast administration.     Thyroid Stimulating Immunoglobulin ordered as well.  Endocrine will continue to follow.   Endocrine pager [phone].   Plan communicated to Primary Team.  Patient is discussed, seen, and examined by Dr. Maradiaga who agrees with the management plan.   Joe Daniel MD

## 2024-09-19 LAB
ALBUMIN SERPL BCP-MCNC: 3.1 G/DL (ref 3.4–5)
ALP SERPL-CCNC: 68 U/L (ref 33–110)
ALT SERPL W P-5'-P-CCNC: 8 U/L (ref 7–45)
ANION GAP SERPL CALC-SCNC: 12 MMOL/L (ref 10–20)
AST SERPL W P-5'-P-CCNC: 11 U/L (ref 9–39)
BASOPHILS # BLD MANUAL: 0.09 X10*3/UL (ref 0–0.1)
BASOPHILS NFR BLD MANUAL: 0.8 %
BILIRUB DIRECT SERPL-MCNC: 0.2 MG/DL (ref 0–0.3)
BILIRUB SERPL-MCNC: 0.5 MG/DL (ref 0–1.2)
BUN SERPL-MCNC: 8 MG/DL (ref 6–23)
CALCIUM SERPL-MCNC: 9.2 MG/DL (ref 8.6–10.6)
CHLORIDE SERPL-SCNC: 100 MMOL/L (ref 98–107)
CO2 SERPL-SCNC: 28 MMOL/L (ref 21–32)
CREAT SERPL-MCNC: 0.63 MG/DL (ref 0.5–1.05)
EGFRCR SERPLBLD CKD-EPI 2021: >90 ML/MIN/1.73M*2
EOSINOPHIL # BLD MANUAL: 0.29 X10*3/UL (ref 0–0.7)
EOSINOPHIL NFR BLD MANUAL: 2.5 %
ERYTHROCYTE [DISTWIDTH] IN BLOOD BY AUTOMATED COUNT: 21.2 % (ref 11.5–14.5)
GLUCOSE SERPL-MCNC: 91 MG/DL (ref 74–99)
HCT VFR BLD AUTO: 24.5 % (ref 36–46)
HGB BLD-MCNC: 7.3 G/DL (ref 12–16)
IMM GRANULOCYTES # BLD AUTO: 0.58 X10*3/UL (ref 0–0.7)
IMM GRANULOCYTES NFR BLD AUTO: 5.1 % (ref 0–0.9)
LYMPHOCYTES # BLD MANUAL: 1.81 X10*3/UL (ref 1.2–4.8)
LYMPHOCYTES NFR BLD MANUAL: 15.9 %
MAGNESIUM SERPL-MCNC: 2.03 MG/DL (ref 1.6–2.4)
MCH RBC QN AUTO: 20 PG (ref 26–34)
MCHC RBC AUTO-ENTMCNC: 29.8 G/DL (ref 32–36)
MCV RBC AUTO: 67 FL (ref 80–100)
MONOCYTES # BLD MANUAL: 0.95 X10*3/UL (ref 0.1–1)
MONOCYTES NFR BLD MANUAL: 8.3 %
MYELOCYTES # BLD MANUAL: 0.29 X10*3/UL
MYELOCYTES NFR BLD MANUAL: 2.5 %
NEUTROPHILS # BLD MANUAL: 7.89 X10*3/UL (ref 1.2–7.7)
NEUTS BAND # BLD MANUAL: 0.09 X10*3/UL (ref 0–0.7)
NEUTS BAND NFR BLD MANUAL: 0.8 %
NEUTS SEG # BLD MANUAL: 7.8 X10*3/UL (ref 1.2–7)
NEUTS SEG NFR BLD MANUAL: 68.4 %
NRBC BLD-RTO: 0 /100 WBCS (ref 0–0)
PHOSPHATE SERPL-MCNC: 3.5 MG/DL (ref 2.5–4.9)
PLASMA CELLS # BLD MANUAL: 0.09 X10*3/UL
PLASMA CELLS NFR BLD MANUAL: 0.8 %
PLATELET # BLD AUTO: 394 X10*3/UL (ref 150–450)
POLYCHROMASIA BLD QL SMEAR: ABNORMAL
POTASSIUM SERPL-SCNC: 3.6 MMOL/L (ref 3.5–5.3)
PROT SERPL-MCNC: 7.2 G/DL (ref 6.4–8.2)
RBC # BLD AUTO: 3.65 X10*6/UL (ref 4–5.2)
RBC MORPH BLD: ABNORMAL
SODIUM SERPL-SCNC: 136 MMOL/L (ref 136–145)
TARGETS BLD QL SMEAR: ABNORMAL
TOTAL CELLS COUNTED BLD: 120
WBC # BLD AUTO: 11.4 X10*3/UL (ref 4.4–11.3)

## 2024-09-19 PROCEDURE — 99232 SBSQ HOSP IP/OBS MODERATE 35: CPT | Performed by: INTERNAL MEDICINE

## 2024-09-19 PROCEDURE — 99233 SBSQ HOSP IP/OBS HIGH 50: CPT | Performed by: STUDENT IN AN ORGANIZED HEALTH CARE EDUCATION/TRAINING PROGRAM

## 2024-09-19 PROCEDURE — 2500000004 HC RX 250 GENERAL PHARMACY W/ HCPCS (ALT 636 FOR OP/ED)

## 2024-09-19 PROCEDURE — 80053 COMPREHEN METABOLIC PANEL: CPT

## 2024-09-19 PROCEDURE — 2500000002 HC RX 250 W HCPCS SELF ADMINISTERED DRUGS (ALT 637 FOR MEDICARE OP, ALT 636 FOR OP/ED)

## 2024-09-19 PROCEDURE — 82248 BILIRUBIN DIRECT: CPT

## 2024-09-19 PROCEDURE — 36415 COLL VENOUS BLD VENIPUNCTURE: CPT

## 2024-09-19 PROCEDURE — 84100 ASSAY OF PHOSPHORUS: CPT

## 2024-09-19 PROCEDURE — 83735 ASSAY OF MAGNESIUM: CPT

## 2024-09-19 PROCEDURE — 2500000001 HC RX 250 WO HCPCS SELF ADMINISTERED DRUGS (ALT 637 FOR MEDICARE OP)

## 2024-09-19 PROCEDURE — 80076 HEPATIC FUNCTION PANEL: CPT

## 2024-09-19 PROCEDURE — 2500000004 HC RX 250 GENERAL PHARMACY W/ HCPCS (ALT 636 FOR OP/ED): Performed by: STUDENT IN AN ORGANIZED HEALTH CARE EDUCATION/TRAINING PROGRAM

## 2024-09-19 PROCEDURE — 85027 COMPLETE CBC AUTOMATED: CPT

## 2024-09-19 PROCEDURE — 1100000001 HC PRIVATE ROOM DAILY

## 2024-09-19 PROCEDURE — 85007 BL SMEAR W/DIFF WBC COUNT: CPT

## 2024-09-19 RX ORDER — OXYCODONE HYDROCHLORIDE 10 MG/1
10 TABLET ORAL EVERY 4 HOURS PRN
Status: DISPENSED | OUTPATIENT
Start: 2024-09-19

## 2024-09-19 RX ORDER — POTASSIUM CHLORIDE 20 MEQ/1
40 TABLET, EXTENDED RELEASE ORAL ONCE
Status: COMPLETED | OUTPATIENT
Start: 2024-09-19 | End: 2024-09-19

## 2024-09-19 RX ORDER — PREDNISONE 20 MG/1
40 TABLET ORAL DAILY
Status: DISCONTINUED | OUTPATIENT
Start: 2024-09-19 | End: 2024-09-19

## 2024-09-19 ASSESSMENT — COGNITIVE AND FUNCTIONAL STATUS - GENERAL
MOBILITY SCORE: 24
DAILY ACTIVITIY SCORE: 24

## 2024-09-19 ASSESSMENT — PAIN DESCRIPTION - LOCATION
LOCATION: NECK

## 2024-09-19 ASSESSMENT — PAIN SCALES - GENERAL
PAINLEVEL_OUTOF10: 7
PAINLEVEL_OUTOF10: 6
PAINLEVEL_OUTOF10: 7
PAINLEVEL_OUTOF10: 5 - MODERATE PAIN
PAINLEVEL_OUTOF10: 7
PAINLEVEL_OUTOF10: 4
PAINLEVEL_OUTOF10: 8
PAINLEVEL_OUTOF10: 6
PAINLEVEL_OUTOF10: 10 - WORST POSSIBLE PAIN
PAINLEVEL_OUTOF10: 7
PAINLEVEL_OUTOF10: 10 - WORST POSSIBLE PAIN
PAINLEVEL_OUTOF10: 6
PAINLEVEL_OUTOF10: 5 - MODERATE PAIN
PAINLEVEL_OUTOF10: 7

## 2024-09-19 ASSESSMENT — PAIN - FUNCTIONAL ASSESSMENT
PAIN_FUNCTIONAL_ASSESSMENT: 0-10

## 2024-09-19 ASSESSMENT — PAIN SCALES - PAIN ASSESSMENT IN ADVANCED DEMENTIA (PAINAD): TOTALSCORE: MEDICATION (SEE MAR)

## 2024-09-19 NOTE — ASSESSMENT & PLAN NOTE
Radha Duncan is a 43 y.o. female with PMHx of Left thyroid mass, Fibroids, and Anxiety/Depression who presented as a transfer from [facility] ED for ENT evaluation. Patient was initially seen by outpatient ENT on 9/3/2024 after she presented to urgent care for sinusitis and incidentally found to have Goiter in May 2024.       Endocrine Background History:   Patient underwent U/S thyroid that showed left sided TI-RADS category 3 thyroid nodule measuring 8.7 cm and another smaller TI-RADS category 3 thyroid nodule measuring 3.0 cm.   Outpatient ENT ordered CT neck on 9/3 but this was not completed. She was scheduled for thyroid biopsy with ENT for 24.     Then patient presented to [facility] ED on  with neck pain and abdominal pain associated with fever, chills, and fatigue. She was found to leukocytosis, TVUS demonstrated malpositioned IUD and fibroids. Patient was discharged on cefdinir and Flagyl on .   In the light of the persistence of symptoms and discomfort, she presents to [facility] on  for ongoing care. Patient reports she started having neck pain on , worsened with turning neck, associated with warmth and tenderness, odynophagia, dysphonia, and inability to lay down flat.   She is unsure if mass has grown in size overtime.   Patient does take MV - does not include Biotin.   She denies history of radiation to neck.   No previous Hx of Malignancy.   She denies family hx of thyroid cancer. However has an aunt with an goiter.   24: IV contrast for CT scan.    Upon initial encounter, 24 pt reported the persistent of neck pain,   Reports tremors only the day she had the fever 1 wk ptp. No palpitations or irritability, no SOB, no n,v, no LE edema, no MSK aches and pain, no skin rashes.   No hx of known autoimmune disease. Patient has endorsed weight loss of 150 pounds since  - exercise and lifestyle modification. No GLP1 R agonists.     Pertinent Imagin/19:   Left sided  TI-RADS category 3 thyroid nodule measuring 8.7 cm and  another smaller TI-RADS category 3 thyroid nodule measuring 3.0 cm.  Both dominant nodules meet the criteria for tissue sampling.    9/16:  Large left thyroid lobe mass measuring 7.0 x 6.3 x 10.5 cm resulting  in significant rightward deviation of the aerodigestive tract as well  as leftward deviation of the carotid space structures. There is mild  narrowing of the trachea at the level of thoracic inlet. Findings are  most consistent with thyroid goiter; however, superimposed malignancy  within the goiter mass is not excluded based on imaging alone.    # Multinodular Goiter with Compressive Symptoms  # Superimposed Thyroiditis - onset suspected on 9/12/24 - in the light of thyroidal tenderness with a precipitant 2 days prior to thyroidal tenderness.

## 2024-09-19 NOTE — ASSESSMENT & PLAN NOTE
Radha Duncan is a 43 y.o. female with PMHx of Left thyroid mass, Fibroids, and Anxiety/Depression who presented as a transfer from [facility] ED for ENT evaluation. Patient was initially seen by outpatient ENT on 9/3/2024 after she presented to urgent care for sinusitis and incidentally found to have Goiter in May 2024.       Endocrine Background History:   Patient underwent U/S thyroid that showed left sided TI-RADS category 3 thyroid nodule measuring 8.7 cm and another smaller TI-RADS category 3 thyroid nodule measuring 3.0 cm.   Outpatient ENT ordered CT neck on 9/3 but this was not completed. She was scheduled for thyroid biopsy with ENT for 24.     Then patient presented to [facility] ED on  with neck pain and abdominal pain associated with fever, chills, and fatigue. She was found to leukocytosis, TVUS demonstrated malpositioned IUD and fibroids. Patient was discharged on cefdinir and Flagyl on .   In the light of the persistence of symptoms and discomfort, she presents to [facility] on  for ongoing care. Patient reports she started having neck pain on , worsened with turning neck, associated with warmth and tenderness, odynophagia, dysphonia, and inability to lay down flat.   She is unsure if mass has grown in size overtime.   Patient does take MV - does not include Biotin.   She denies history of radiation to neck.   No previous Hx of Malignancy.   She denies family hx of thyroid cancer. However has an aunt with an goiter.   24: IV contrast for CT scan.    Upon initial encounter, 24 pt reported the persistent of neck pain,   Reports tremors only the day she had the fever 1 wk ptp. No palpitations or irritability, no SOB, no n,v, no LE edema, no MSK aches and pain, no skin rashes.   No hx of known autoimmune disease. Patient has endorsed weight loss of 150 pounds since  - exercise and lifestyle modification. No GLP1 R agonists.     Pertinent Imagin/19:   Left sided  TI-RADS category 3 thyroid nodule measuring 8.7 cm and  another smaller TI-RADS category 3 thyroid nodule measuring 3.0 cm.  Both dominant nodules meet the criteria for tissue sampling.    9/16:  Large left thyroid lobe mass measuring 7.0 x 6.3 x 10.5 cm resulting  in significant rightward deviation of the aerodigestive tract as well  as leftward deviation of the carotid space structures. There is mild  narrowing of the trachea at the level of thoracic inlet. Findings are  most consistent with thyroid goiter; however, superimposed malignancy  within the goiter mass is not excluded based on imaging alone.    # Multinodular Goiter with Compressive Symptoms  # Superimposed Subacute Thyroiditis - onset suspected on 9/12/24 - in the light of timeframe of thyroidal tenderness with a prodrome clearly identified.CT imaging did not identify any abscess, making infection less likely.    Initial Endocrine Recommendations - 9/18/24:      Patient's thyroid nodule is chronic on imaging from 8/24 to 9/24 -  it appears that has grown in volume by 44 %. ENT following for this and deem no indication for acute surgical intervention.      As far as her neck pain, thyroiditis could be possible in the light of tenderness to palpation with recent suppressed TSH.   Initial recommendations for Naproxen 500 mg orally BID - for 2 days as scheduled.      Repeat Panel on 9/18 - labs ordered as add on - Free T4 1.33/ TSH: 1.3/ Free T3 2.6  Thyroid Stimulating Immunoglobulin ordered as well    Endocrine recommendations - 9/19/24:  TSH of 1.3 - Free T4 of 1.33 - Free T3 of 2.6 - within normal limits  For analgesia, pt. has received Toradol of 30 mg IV Q6 hours - prn.    Patient reports mild alleviation of pain.   Patient to follow up with ENT as outpatient.  Endocrine will continue to follow.   Endocrine pager [phone].   Plan communicated to Primary Team.

## 2024-09-19 NOTE — PROGRESS NOTES
"Radha Duncan is a 43 y.o. female on day 3 of admission presenting with Neck mass.    Subjective   Patient is seen and examined this AM.   Reports spacing - in terms of analgesic requirements.     Objective   Physical Exam  Vitals:    09/19/24 0449   BP: 153/89   Pulse: 96   Resp: 18   Temp: 36.1 °C (97 °F)   SpO2: 96%   Constitutional: CCO3   HEENT: Mild proptosis, Globe is soft.  L anterior neck mass, tender to touch, warm, solid.   Respiratory: CTA bilaterally. No wheezes, rales, or rhonchi.   Cardiovascular: RRR. No murmurs, gallops, or rubs. No JVD. Radial pulses 2+.  Abdominal: +BS, Soft, nt, nondistended,    Neuro: AAOx4. Cooperative. No hyperreflexia.   MSK: No LE edema bilaterally.  Skin: Warm  Psych: Appropriate mood and affect     Last Recorded Vitals  Blood pressure 153/89, pulse 96, temperature 36.1 °C (97 °F), resp. rate 18, height 1.676 m (5' 6\"), weight 109 kg (240 lb), last menstrual period 09/04/2024, SpO2 96%.  Intake/Output last 3 Shifts:  I/O last 3 completed shifts:  In: 1330 (12.2 mL/kg) [P.O.:30; IV Piggyback:1300]  Out: - (0 mL/kg)   Weight: 108.9 kg     Relevant Results  Results from last 7 days   Lab Units 09/19/24  0658 09/18/24  0743 09/16/24  1008   GLUCOSE mg/dL 91 108* 96       Current Facility-Administered Medications:     acetaminophen (Tylenol) tablet 975 mg, 975 mg, oral, TID, Stanton Landrum MD, 975 mg at 09/19/24 0842    dexAMETHasone (Decadron) injection 4 mg, 4 mg, intravenous, q8h, Yadi Galvan DO    ferrous sulfate (325 mg ferrous sulfate) tablet 325 mg, 65 mg of iron, oral, Daily, Stanton Landrum MD, 325 mg at 09/19/24 0842    HYDROmorphone (Dilaudid) injection 0.4 mg, 0.4 mg, intravenous, q4h PRN, Stanton Landrum MD, 0.4 mg at 09/19/24 0633    levoFLOXacin (Levaquin) 750 mg in dextrose 5%  mL, 750 mg, intravenous, q24h, Yadi Galvan DO, Stopped at 09/19/24 0418    magnesium oxide (Mag-Ox) tablet 400 mg, 400 mg, oral, Daily, Oscar Fuller MD, 400 mg at " 09/19/24 0842    ondansetron (Zofran) injection 4 mg, 4 mg, intravenous, Once, Oscar Fuller MD    oxyCODONE (Roxicodone) immediate release tablet 10 mg, 10 mg, oral, q4h PRN, Oscar Fuller MD    polyethylene glycol (Glycolax, Miralax) packet 17 g, 17 g, oral, Daily, Stanton Landrum MD, 17 g at 09/19/24 0843       Assessment/Plan   Assessment & Plan  Neck mass    Radha Duncan is a 43 y.o. female with PMHx of Left thyroid mass, Fibroids, and Anxiety/Depression who presented as a transfer from [facility] ED for ENT evaluation. Patient was initially seen by outpatient ENT on 9/3/2024 after she presented to urgent care for sinusitis and incidentally found to have Goiter in May 2024.       Endocrine Background History:   Patient underwent U/S thyroid that showed left sided TI-RADS category 3 thyroid nodule measuring 8.7 cm and another smaller TI-RADS category 3 thyroid nodule measuring 3.0 cm.   Outpatient ENT ordered CT neck on 9/3 but this was not completed. She was scheduled for thyroid biopsy with ENT for 9/17/24.     Then patient presented to [facility] ED on 9/11 with neck pain and abdominal pain associated with fever, chills, and fatigue. She was found to leukocytosis, TVUS demonstrated malpositioned IUD and fibroids. Patient was discharged on cefdinir and Flagyl on 9/12.   In the light of the persistence of symptoms and discomfort, she presents to [facility] on 9/16 for ongoing care. Patient reports she started having neck pain on 9/13, worsened with turning neck, associated with warmth and tenderness, odynophagia, dysphonia, and inability to lay down flat.   She is unsure if mass has grown in size overtime.   Patient does take MV - does not include Biotin.   She denies history of radiation to neck.   No previous Hx of Malignancy.   She denies family hx of thyroid cancer. However has an aunt with an goiter.   9/16/24: IV contrast for CT scan.    Upon initial encounter, 9/18/24 pt reported the  persistent of neck pain,   Reports tremors only the day she had the fever 1 wk ptp. No palpitations or irritability, no SOB, no n,v, no LE edema, no MSK aches and pain, no skin rashes.   No hx of known autoimmune disease. Patient has endorsed weight loss of 150 pounds since  - exercise and lifestyle modification. No GLP1 R agonists.     Pertinent Imagin/19:   Left sided TI-RADS category 3 thyroid nodule measuring 8.7 cm and  another smaller TI-RADS category 3 thyroid nodule measuring 3.0 cm.  Both dominant nodules meet the criteria for tissue sampling.    :  Large left thyroid lobe mass measuring 7.0 x 6.3 x 10.5 cm resulting  in significant rightward deviation of the aerodigestive tract as well  as leftward deviation of the carotid space structures. There is mild  narrowing of the trachea at the level of thoracic inlet. Findings are  most consistent with thyroid goiter; however, superimposed malignancy  within the goiter mass is not excluded based on imaging alone.    # Multinodular Goiter with Compressive Symptoms  # Superimposed Subacute Thyroiditis - onset suspected on 24 - in the light of timeframe of thyroidal tenderness with a prodrome clearly identified.CT imaging did not identify any abscess, making infection less likely.    Initial Endocrine Recommendations - 24:      Patient's thyroid nodule is chronic on imaging from  to  -  it appears that has grown in volume by 44 %. ENT following for this and deem no indication for acute surgical intervention.      As far as her neck pain, thyroiditis could be possible in the light of tenderness to palpation with recent suppressed TSH.   Initial recommendations for Naproxen 500 mg orally BID - for 2 days as scheduled.      Repeat Panel on  - labs ordered as add on - Free T4 1.33/ TSH: 1.3/ Free T3 2.6  Thyroid Stimulating Immunoglobulin ordered as well    Endocrine recommendations - 24:  TSH of 1.3 - Free T4 of 1.33 - Free T3  of 2.6 - within normal limits  For analgesia, pt. has received Toradol of 30 mg IV Q6 hours - prn.    Patient reports mild alleviation of pain.   Patient to follow up with ENT as outpatient.  Endocrine will continue to follow.   Endocrine pager [phone].   Plan communicated to Primary Team.    Joe Daniel MD

## 2024-09-19 NOTE — CARE PLAN
The patient's goals for the shift include      The clinical goals for the shift include pt will rate her pain less than 5/10 by end of shift.      Problem: Pain - Adult  Goal: Verbalizes/displays adequate comfort level or baseline comfort level  Outcome: Progressing     Problem: Safety - Adult  Goal: Free from fall injury  Outcome: Progressing     Problem: Discharge Planning  Goal: Discharge to home or other facility with appropriate resources  Outcome: Progressing     Problem: Chronic Conditions and Co-morbidities  Goal: Patient's chronic conditions and co-morbidity symptoms are monitored and maintained or improved  Outcome: Progressing     Problem: Pain  Goal: Takes deep breaths with improved pain control throughout the shift  Outcome: Progressing  Goal: Turns in bed with improved pain control throughout the shift  Outcome: Progressing  Goal: Walks with improved pain control throughout the shift  Outcome: Progressing  Goal: Performs ADL's with improved pain control throughout shift  Outcome: Progressing  Goal: Participates in PT with improved pain control throughout the shift  Outcome: Progressing  Goal: Free from opioid side effects throughout the shift  Outcome: Progressing  Goal: Free from acute confusion related to pain meds throughout the shift  Outcome: Progressing

## 2024-09-19 NOTE — PROGRESS NOTES
Daily Progress Note    Radha Duncan is a 43 y.o. female on day 3 of admission presenting with Neck mass.    Subjective   No acute overnight events. Patient seen and examined at bedside. The patient did not sleep well overnight due to continued pain. States she was able to eat something yesterday. Endorses bowel movement yesterday darker in appearance. Patient denies difficulty swallowing, changes to voice, or difficulty breathing.    Patient denies CP/SOB, N/V/D, leg swelling.      Objective   Vitals: I/O:   Vitals:    09/19/24 0449   BP: 153/89   Pulse: 96   Resp: 18   Temp: 36.1 °C (97 °F)   SpO2: 96%        Temp  Min: 36.1 °C (97 °F)  Max: 36.5 °C (97.7 °F)  Pulse  Min: 86  Max: 96  BP  Min: 126/80  Max: 153/89  Resp  Min: 18  Max: 18  SpO2  Min: 94 %  Max: 98 %   Intake/Output Summary (Last 24 hours) at 9/19/2024 0702  Last data filed at 9/19/2024 0214  Gross per 24 hour   Intake 184 ml   Output --   Net 184 ml        Net IO Since Admission: 1,680 mL [09/19/24 0702]      Physical Exam  Constitutional:       Appearance: Normal appearance.   HENT:      Head: Normocephalic and atraumatic.   Eyes:      Comments:  Mild proptosis, Globe is soft.   Neck:      Comments:  L anterior neck mass, tender to touch, warm, solid.   Cardiovascular:      Rate and Rhythm: Normal rate and regular rhythm.      Heart sounds: Normal heart sounds. No murmur heard.     No gallop.   Pulmonary:      Effort: Pulmonary effort is normal.      Breath sounds: Normal breath sounds.   Abdominal:      General: Abdomen is flat.      Palpations: Abdomen is soft.      Tenderness: There is no abdominal tenderness.   Musculoskeletal:         General: No swelling.   Skin:     General: Skin is warm and dry.   Neurological:      Mental Status: She is alert.   Psychiatric:         Mood and Affect: Mood normal.         Behavior: Behavior normal.         Medications:  Scheduled Medications PRN Medications   acetaminophen, 975 mg, oral, TID  ferrous  sulfate (325 mg ferrous sulfate), 65 mg of iron, oral, Daily  levoFLOXacin, 750 mg, intravenous, q24h  magnesium oxide, 400 mg, oral, Daily  naproxen, 500 mg, oral, BID  ondansetron, 4 mg, intravenous, Once  polyethylene glycol, 17 g, oral, Daily      PRN medications: HYDROmorphone, ketorolac, oxyCODONE      Continuous medications       Relevant Results:  Results from last 7 days   Lab Units 09/18/24  0743 09/16/24  1008   WBC AUTO x10*3/uL 11.0 17.4*   HEMOGLOBIN g/dL 7.9* 8.8*   HEMATOCRIT % 27.9* 30.3*   PLATELETS AUTO x10*3/uL 366 411            Results from last 7 days   Lab Units 09/18/24  0743 09/16/24  1008   SODIUM mmol/L 139 136   POTASSIUM mmol/L 3.4* 3.5   CHLORIDE mmol/L 101 101   CO2 mmol/L 28 24   BUN mg/dL 5* 6   CREATININE mg/dL 0.61 0.58   CALCIUM mg/dL 9.1 8.7     Results from last 7 days   Lab Units 09/18/24  0743 09/16/24  1008   ALK PHOS U/L 65 74   BILIRUBIN TOTAL mg/dL 0.6 0.6   BILIRUBIN DIRECT mg/dL 0.2 0.2   PROTEIN TOTAL g/dL 7.4 7.8   ALT U/L 8 16   AST U/L 11 25           No orders to display       Assessment/Plan   Radha Duncan is a 43 y.o. female with PMHx of L thyroid mass, fibroids, and anxiety/depression who presents as a transfer from [facility] ED for ENT evaluation for L anterior neck mass. CT neck showed large L thyroid mass with compressive symptoms. TSH 0.67, repeat TSH 0.26 w/T4 1.29 N, TPO <28 WNL, thyroglobulin pending (>3500 on 09/03.). Ddx include subacute thyroiditis due to presenting sx of significant neck/tenderness, low TSH, elevated CRP & ESR, leukocytosis, and mild anemia in setting of 4-5 month hx of goiter. Based on CT scan showing significant swelling of goiter and encroachment on trachea, there is concern for progression to airway compromise. ENT consulted to evaluate best route possible in terms of removal of mass vs biopsy and scope to determine underlying cause. WBCs decreased on day 2 of admission to 11.0, possibly indicating improvement of infection  due to antibiotics. However, thyroiditis does not seem to be improving based on patient's continued severe pain. This may indicate thyroiditis is not infectious in nature, but etiology of inflammatory process is unclear.    Updates (09/19/24):  Patient currently receiving IV levofloxacin 750 5 day course for c/f infection and oral iron 325 mg for anemia  Patient continuing to rate her pain at 8.5 despite increased regimen - can increase regimen to oxy 10 q4H, naproxen 500 mg, discontinue toradol  Thyroglobulin > 21,000, Free T4, free T3, TSH all normal on repeat labs, TSI pending  ENT continuing to follow, recs continuing IV antibiotics and then beginning short course steroids to reduce inflammation before any type of intervention  Endo continuing to follow - patient's thyroid nodule has grown 44% in volume over past 2 months; multinodular goiter with superimposed thyroiditis, add naproxen 500 mg to pain regimen  Discussed with patient concern for compression of airway; alert team for signs of respiratory distress, wheezing, or stridor      #Multinodular goiter with Compressive sx  :: Hx of goiter neck mass since May 2024, being evaluated by ENT outpatient; patient's thyroid nodule has grown 44% in volume over past 2 months  :: CT scan shows Large left thyroid lobe mass measuring 7.0 x 6.3 x 10.5 cm resulting  in significant rightward deviation of the aerodigestive tract and leftward deviation of the carotid space structures, mild narrowing of trachea at level of thoracic inlet; Findings are most consistent with thyroid goiter, but superimposed malignancy  within the goiter mass is not excluded.  - ENT consulted - surgery cannot be done until neck inflammation is reduced, start short course steroids  - Endo consulted - superimposed thyroiditis  - Pain control: tylenol 975 mg PO TID, oxy 10 mg q4h prn; IV dilaudid 0.4 mg, naproxen 500 mg     #Leukocytosis WBC 17.4 on 9/17 (WBC 4.8 on 9/3/24), unclear etiology  #C/f  superimposed thyroiditis, unclear type  :: S/p vancomycin in ED; S/p course of cefdinir and flagyl OP x 5 days; Persistent leukocytosis despite abx therapy; WBC normal earlier this month  :: Blood cultures x2 NGTD, urine culture NGTD  :: UA showed negative leukocyte esterase and nitrites, 1+ blood, 2+ protein (hx of fibroids)  :: Free thyroxine WNL, TPO antibody normal  :: WBCs 11.4, WNL  - patient currently on day 3 of Levofloxacin 750 daily x 5d  - ENT consult, appreciate recs     #Microcytic Anemia  :: Hb 8.8 MCV 69. Was 7.8 previously.  - LYNNE workup, patient receiving oral iron  - Trend CBC      IVF: PRN  Electrolytes: K>4, P>3, Mg>2  Access: PIV    Diet: NPO Diet Except: Sips with meds; Effective midnight   DVT Ppx: waiting to hear ENT evaluation based on intervention  GI Ppx: Not indicated  Bowel regimen: Miralax  Pain management: tylenol 975 mg TID, oxycodone 10mg q6H PRN, dilaudid injection 0.4mg q4H PRN, toradol 30 mg PRN    Extended Emergency Contact Information  Primary Emergency Contact: Adan Hernadez  Home Phone: [phone]  Relation: Spouse  Secondary Emergency Contact: Meg Jorgensen   [facility]  Home Phone: [phone]  Relation: None   Code status: Full Code (confirmed on admission)  PT/OT: [ordered, as able]  Disposition: discharge to home when medically ready    BARB GORDILLO, MS3

## 2024-09-19 NOTE — CONSULTS
Inpatient consult to ENT  Consult performed by: Joe Daniel MD  Consult ordered by: Jarod Olivarez MD  Reason for consult: Eval of Multinodular Goiter  Assessment/Recommendations: Eval of Multinodular Goiter          Reason For Consult  Eval of Multinodular Goiter    History Of Present Illness  Radha Duncan is a 43 y.o. female with PMHx of Left thyroid mass, Fibroids, and Anxiety/Depression who presented as a transfer from [facility] ED for ENT evaluation. Endocrine is consulted for Evaluation of Multinodular Goiter.     Endocrine Background History:   Patient was initially seen by outpatient ENT on 9/3/2024 after she presented to urgent care for sinusitis and incidentally found to have Goiter in May 2024.   Patient underwent U/S thyroid that showed left sided TI-RADS category 3 thyroid nodule measuring 8.7 cm and another smaller TI-RADS category 3 thyroid nodule measuring 3.0 cm.   Outpatient ENT ordered CT neck on 9/3 but this was not completed. She was scheduled for thyroid biopsy with ENT for 9/17/24.      Then patient presented to [facility] ED on 9/11 with neck pain and abdominal pain associated with fever, chills, and fatigue. She was found to leukocytosis, TVUS demonstrated malpositioned IUD and fibroids. Patient was discharged on cefdinir and Flagyl on 9/12.   In the light of the persistence of symptoms and discomfort, she presents to [facility] on 9/16 for ongoing care. Patient reports she started having neck pain on 9/13, worsened with turning neck, associated with warmth and tenderness, odynophagia, dysphonia, and inability to lay down flat.   She is unsure if mass has grown in size overtime.   Patient does take MV - does not include Biotin.   She denies history of radiation to neck.   No previous Hx of Malignancy.   No recent Sick Contacts.  She denies family hx of thyroid cancer. However has an aunt with an goiter.   9/16/24: IV contrast for CT scan.     Upon initial encounter with our team on   "9/18/24 pt reported the persistent of neck pain,   Reports tremors only the day she had the fever 1 wk ptp. No palpitations or irritability, no SOB, no n,v, no LE edema, no MSK aches and pain, no skin rashes.   No hx of known autoimmune disease. NO recent pregnancies or deliveries. Patient has endorsed weight loss of 150 pounds since 2020 - exercise and lifestyle modification. No GLP1 R agonists.     Past Medical History  She has no past medical history on file.    Surgical History  She has no past surgical history on file.     Social History  She reports that she has never smoked. She has never used smokeless tobacco. She reports current alcohol use of about 1.0 standard drink of alcohol per week. She reports that she does not use drugs.    Family History  Family History   Problem Relation Name Age of Onset    Hypertension Mother      Hypertension Mother's Sister      Breast cancer Mother's Sister      Malig Hypertension Mother's Brother          Allergies  Penicillins    Review of Systems As above     Physical Exam  Vitals:    09/19/24 0449   BP: 153/89   Pulse: 96   Resp: 18   Temp: 36.1 °C (97 °F)   SpO2: 96%    Constitutional: CCO3   HEENT: Mild proptosis, Globe is soft.  L anterior neck mass, tender to touch, warm, solid.   Respiratory: CTA bilaterally. No wheezes, rales, or rhonchi.   Cardiovascular: RRR. No murmurs, gallops, or rubs. No JVD. Radial pulses 2+.  Abdominal: +BS, Soft, nt, nondistended,    Neuro: AAOx4. Cooperative. No hyperreflexia.   MSK: No LE edema bilaterally.  Skin: Warm  Psych: Appropriate mood and affect    Last Recorded Vitals  Blood pressure 153/89, pulse 96, temperature 36.1 °C (97 °F), resp. rate 18, height 1.676 m (5' 6\"), weight 109 kg (240 lb), last menstrual period 09/04/2024, SpO2 96%.    Relevant Results  Results from last 7 days   Lab Units 09/19/24  0658 09/18/24  0743 09/16/24  1008   GLUCOSE mg/dL 91 108* 96     Current Facility-Administered Medications:     acetaminophen " (Tylenol) tablet 975 mg, 975 mg, oral, TID, Stanton Landrum MD, 975 mg at 09/19/24 0842    ferrous sulfate (325 mg ferrous sulfate) tablet 325 mg, 65 mg of iron, oral, Daily, Stanton Landrum MD, 325 mg at 09/19/24 0842    HYDROmorphone (Dilaudid) injection 0.4 mg, 0.4 mg, intravenous, q4h PRN, Stanton Landrum MD, 0.4 mg at 09/19/24 0633    levoFLOXacin (Levaquin) 750 mg in dextrose 5%  mL, 750 mg, intravenous, q24h, Stanton Landrum MD, Stopped at 09/19/24 0418    magnesium oxide (Mag-Ox) tablet 400 mg, 400 mg, oral, Daily, Oscar Fuller MD, 400 mg at 09/19/24 0842    ondansetron (Zofran) injection 4 mg, 4 mg, intravenous, Once, Oscar Fuller MD    oxyCODONE (Roxicodone) immediate release tablet 10 mg, 10 mg, oral, q4h PRN, Oscar Fuller MD    polyethylene glycol (Glycolax, Miralax) packet 17 g, 17 g, oral, Daily, Stanton Landrum MD, 17 g at 09/19/24 0843    predniSONE (Deltasone) tablet 40 mg, 40 mg, oral, Daily, Oscar Fuller MD   Interpreted By:  Aby Mcgarry,  and Ceci Celestin   STUDY:  CT SOFT TISSUE NECK W IV CONTRAST;  9/16/2024 1:00 pm      INDICATION:  Signs/Symptoms:neck pain has massive goiter.          COMPARISON:  Thyroid ultrasound obtained August 19, 2024      ACCESSION NUMBER(S):  [id number]      ORDERING CLINICIAN:  ANGÉLICA HARRELL      TECHNIQUE:  Helical CT images of the neck were obtained.  The patient received 75  ML of Omnipaque 350 intravenous contrast agent. The images were  reformatted in angled axial, coronal and sagittal planes.      FINDINGS:  Oral Cavity, Pharynx and Larynx:  Evaluation oral cavity is limited  by streak artifact from dental hardware. Left-to-right midline shift  observed of the glottic and subglottic airway. The nasopharyngeal and  oropharyngeal structures are otherwise unremarkable. The  hypopharyngeal and laryngeal structures are otherwise unremarkable.      Retropharyngeal and Prevertebral Soft Tissues: Retropharyngeal edema  is noted.       Lymph nodes: There are few non specific bilateral neck nodes,  probably reactive in etiology.      Neck vessels: Partial effacement of the left internal jugular vein is  observed by goiter mass. Arterial vascular structures appear intact.  Bilateral neck vessels are normal in course and caliber and appear  patent.      Thyroid gland: There is a 7.0 x 6.3 x 10.5 cm low attenuating  circumscribed mass centered within the left thyroid lobe, which  causes significant deviation of the glottic and subglottic trachea to  the right. Multiple indwelling punctate calcifications are noted  within this mass. The demonstrated residual left lobe thyroid  parenchyma is heterogeneous with multiple low attenuating nodules.  There is rightward deviation of the trachea and esophagus as well as  lateral deviation of the left-sided carotid space. There is extension  into the suprasternal notch with a small amount of superior  mediastinal extension.      Parotid and submandibular glands: Bilateral parotid and submandibular  glands are unremarkable in appearance.      Paranasal Sinuses and Mastoids: Visualized paranasal sinuses and  bilateral mastoids are clear.      Visualized orbital structures are unremarkable.      Visualized upper lungs are clear.      No acute or aggressive appearing bony abnormalities.      IMPRESSION:  Large left thyroid lobe mass measuring 7.0 x 6.3 x 10.5 cm resulting  in significant rightward deviation of the aerodigestive tract as well  as leftward deviation of the carotid space structures. There is mild  narrowing of the trachea at the level of thoracic inlet. Findings are  most consistent with thyroid goiter; however, superimposed malignancy  within the goiter mass is not excluded based on imaging alone.    US: 8/19   RIGHT LOBE:  The right lobe of the thyroid measures 1.5 cm x 1.3 cm x 5.5 cm. The  right lobe of the thyroid is homogeneous in echotexture and  demonstrates no nodules. Multiple  subcentimeter nodules.      LEFT LOBE:  The left lobe of the thyroid measures 6.6 cm x 5.6 cm x 11.3 cm. The  left lobe of the thyroid is homogeneous in echotexture.      Nodule 1:  Size: 3.0 x 1.1 x 2.1 cm  Location: Left superior.  Composition: Solid (2)  Echogenicity: Isoechoic (1)  Margins: Smooth (0)  Shape: Wider than taller (0)  Echogenic foci: None (0).      Total points: 3. TI-RADS category: 3.      Nodule 2:  Size: 8.7 x 5.6 x 6.6 cm  Location: Almost complete left-sided.  Composition: Solid (2)  Echogenicity: Isoechoic (1)  Margins: Smooth (0)  Shape: Wider than taller (0)  Echogenic foci: None (0).      Total points: 3. TI-RADS category: 3.      ISTHMUS:  The isthmus measures approximately 0.4 cm and is homogeneous in  echotexture and without any identifiable nodules.      CERVICAL LYMPH NODES:  None      IMPRESSION:  1. Left sided TI-RADS category 3 thyroid nodule measuring 8.7 cm and  another smaller TI-RADS category 3 thyroid nodule measuring 3.0 cm.  Both dominant nodules meet the criteria for tissue sampling.           Assessment/Plan   Assessment & Plan  Neck mass    Radha Duncan is a 43 y.o. female with PMHx of Left thyroid mass, Fibroids, and Anxiety/Depression who presented as a transfer from [facility] ED for ENT evaluation. Patient was initially seen by outpatient ENT on 9/3/2024 after she presented to urgent care for sinusitis and incidentally found to have Goiter in May 2024.       Endocrine Background History:   Patient underwent U/S thyroid that showed left sided TI-RADS category 3 thyroid nodule measuring 8.7 cm and another smaller TI-RADS category 3 thyroid nodule measuring 3.0 cm.   Outpatient ENT ordered CT neck on 9/3 but this was not completed. She was scheduled for thyroid biopsy with ENT for 9/17/24.     Then patient presented to [facility] ED on 9/11 with neck pain and abdominal pain associated with fever, chills, and fatigue. She was found to leukocytosis, TVUS demonstrated  malpositioned IUD and fibroids. Patient was discharged on cefdinir and Flagyl on .   In the light of the persistence of symptoms and discomfort, she presents to [facility] on  for ongoing care. Patient reports she started having neck pain on , worsened with turning neck, associated with warmth and tenderness, odynophagia, dysphonia, and inability to lay down flat.   She is unsure if mass has grown in size overtime.   Patient does take MV - does not include Biotin.   She denies history of radiation to neck.   No previous Hx of Malignancy.   She denies family hx of thyroid cancer. However has an aunt with an goiter.   24: IV contrast for CT scan.    Upon initial encounter, 24 pt reported the persistent of neck pain,   Reports tremors only the day she had the fever 1 wk ptp. No palpitations or irritability, no SOB, no n,v, no LE edema, no MSK aches and pain, no skin rashes.   No hx of known autoimmune disease. Patient has endorsed weight loss of 150 pounds since  - exercise and lifestyle modification. No GLP1 R agonists.     Pertinent Imagin/19:   Left sided TI-RADS category 3 thyroid nodule measuring 8.7 cm and  another smaller TI-RADS category 3 thyroid nodule measuring 3.0 cm.  Both dominant nodules meet the criteria for tissue sampling.    :  Large left thyroid lobe mass measuring 7.0 x 6.3 x 10.5 cm resulting  in significant rightward deviation of the aerodigestive tract as well  as leftward deviation of the carotid space structures. There is mild  narrowing of the trachea at the level of thoracic inlet. Findings are  most consistent with thyroid goiter; however, superimposed malignancy  within the goiter mass is not excluded based on imaging alone.    # Multinodular Goiter with Compressive Symptoms  # Superimposed Thyroiditis - onset suspected on 24 - in the light of thyroidal tenderness with a precipitant 2 days prior to thyroidal tenderness.       Endocrine Recommendations -  9/18/24:      Patient's thyroid nodule is chronic on imaging from 8/24 to 9/24 -  it appears that has grown in volume by 44 %. ENT following for this.      As far as her neck pain, thyroiditis could be possible in the light of tenderness to palpation with recent suppressed TSH.   Recommendations for Naproxen 500 mg orally BID - for 2 days as scheduled.      For Today - 9/18/24: Please repeat Free T4, Free T3, and TSH.   Previous panel from 9/16/24 - following contrast administration.     Update:   Repeat Panel on 9/18 - labs ordered as add on - Free T4 1.33/ TSH: 1.3/ Free T3 2.6   Thyroid Stimulating Immunoglobulin ordered as well.    Endocrine will continue to follow.   Endocrine pager [phone].   Plan communicated to Primary Team.  Patient is discussed, seen, and examined by Dr. Maradiaga who agrees with the management plan.     Joe Daniel MD

## 2024-09-20 LAB
ALBUMIN SERPL BCP-MCNC: 3.3 G/DL (ref 3.4–5)
ALP SERPL-CCNC: 67 U/L (ref 33–110)
ALT SERPL W P-5'-P-CCNC: 11 U/L (ref 7–45)
ANION GAP SERPL CALC-SCNC: 16 MMOL/L (ref 10–20)
AST SERPL W P-5'-P-CCNC: 14 U/L (ref 9–39)
BASOPHILS # BLD AUTO: 0.03 X10*3/UL (ref 0–0.1)
BASOPHILS NFR BLD AUTO: 0.2 %
BILIRUB DIRECT SERPL-MCNC: 0.1 MG/DL (ref 0–0.3)
BILIRUB SERPL-MCNC: 0.4 MG/DL (ref 0–1.2)
BUN SERPL-MCNC: 11 MG/DL (ref 6–23)
C DIF TOX TCDA+TCDB STL QL NAA+PROBE: NOT DETECTED
CALCIUM SERPL-MCNC: 10 MG/DL (ref 8.6–10.6)
CHLORIDE SERPL-SCNC: 100 MMOL/L (ref 98–107)
CO2 SERPL-SCNC: 25 MMOL/L (ref 21–32)
CREAT SERPL-MCNC: 0.64 MG/DL (ref 0.5–1.05)
EGFRCR SERPLBLD CKD-EPI 2021: >90 ML/MIN/1.73M*2
EOSINOPHIL # BLD AUTO: 0.01 X10*3/UL (ref 0–0.7)
EOSINOPHIL NFR BLD AUTO: 0.1 %
ERYTHROCYTE [DISTWIDTH] IN BLOOD BY AUTOMATED COUNT: 21.6 % (ref 11.5–14.5)
GLUCOSE SERPL-MCNC: 110 MG/DL (ref 74–99)
HCT VFR BLD AUTO: 26.2 % (ref 36–46)
HGB BLD-MCNC: 7.7 G/DL (ref 12–16)
HYPOCHROMIA BLD QL SMEAR: NORMAL
IMM GRANULOCYTES # BLD AUTO: 0.36 X10*3/UL (ref 0–0.7)
IMM GRANULOCYTES NFR BLD AUTO: 2 % (ref 0–0.9)
LYMPHOCYTES # BLD AUTO: 1.37 X10*3/UL (ref 1.2–4.8)
LYMPHOCYTES NFR BLD AUTO: 7.6 %
MAGNESIUM SERPL-MCNC: 2.16 MG/DL (ref 1.6–2.4)
MCH RBC QN AUTO: 19.8 PG (ref 26–34)
MCHC RBC AUTO-ENTMCNC: 29.4 G/DL (ref 32–36)
MCV RBC AUTO: 68 FL (ref 80–100)
MONOCYTES # BLD AUTO: 0.51 X10*3/UL (ref 0.1–1)
MONOCYTES NFR BLD AUTO: 2.8 %
NEUTROPHILS # BLD AUTO: 15.7 X10*3/UL (ref 1.2–7.7)
NEUTROPHILS NFR BLD AUTO: 87.3 %
NRBC BLD-RTO: 0.1 /100 WBCS (ref 0–0)
PHOSPHATE SERPL-MCNC: 2.7 MG/DL (ref 2.5–4.9)
PLATELET # BLD AUTO: 469 X10*3/UL (ref 150–450)
POTASSIUM SERPL-SCNC: 4.5 MMOL/L (ref 3.5–5.3)
PROT SERPL-MCNC: 7.7 G/DL (ref 6.4–8.2)
RBC # BLD AUTO: 3.88 X10*6/UL (ref 4–5.2)
RBC MORPH BLD: NORMAL
ROULEAUX BLD QL SMEAR: PRESENT
SODIUM SERPL-SCNC: 136 MMOL/L (ref 136–145)
SPHEROCYTES BLD QL SMEAR: NORMAL
T3FREE SERPL-MCNC: 1.9 PG/ML (ref 2.3–4.2)
T4 FREE SERPL-MCNC: 1.31 NG/DL (ref 0.78–1.48)
TARGETS BLD QL SMEAR: NORMAL
WBC # BLD AUTO: 18 X10*3/UL (ref 4.4–11.3)

## 2024-09-20 PROCEDURE — 2500000004 HC RX 250 GENERAL PHARMACY W/ HCPCS (ALT 636 FOR OP/ED): Performed by: STUDENT IN AN ORGANIZED HEALTH CARE EDUCATION/TRAINING PROGRAM

## 2024-09-20 PROCEDURE — 80053 COMPREHEN METABOLIC PANEL: CPT

## 2024-09-20 PROCEDURE — 87493 C DIFF AMPLIFIED PROBE: CPT

## 2024-09-20 PROCEDURE — 84100 ASSAY OF PHOSPHORUS: CPT

## 2024-09-20 PROCEDURE — 84481 FREE ASSAY (FT-3): CPT

## 2024-09-20 PROCEDURE — 2500000004 HC RX 250 GENERAL PHARMACY W/ HCPCS (ALT 636 FOR OP/ED)

## 2024-09-20 PROCEDURE — 1100000001 HC PRIVATE ROOM DAILY

## 2024-09-20 PROCEDURE — 80076 HEPATIC FUNCTION PANEL: CPT

## 2024-09-20 PROCEDURE — 2500000001 HC RX 250 WO HCPCS SELF ADMINISTERED DRUGS (ALT 637 FOR MEDICARE OP)

## 2024-09-20 PROCEDURE — 99233 SBSQ HOSP IP/OBS HIGH 50: CPT | Performed by: STUDENT IN AN ORGANIZED HEALTH CARE EDUCATION/TRAINING PROGRAM

## 2024-09-20 PROCEDURE — 84439 ASSAY OF FREE THYROXINE: CPT

## 2024-09-20 PROCEDURE — 83735 ASSAY OF MAGNESIUM: CPT

## 2024-09-20 PROCEDURE — 85025 COMPLETE CBC W/AUTO DIFF WBC: CPT

## 2024-09-20 PROCEDURE — 36415 COLL VENOUS BLD VENIPUNCTURE: CPT

## 2024-09-20 RX ORDER — LOPERAMIDE HYDROCHLORIDE 2 MG/1
2 CAPSULE ORAL 4 TIMES DAILY PRN
Status: DISPENSED | OUTPATIENT
Start: 2024-09-20

## 2024-09-20 RX ORDER — L. ACIDOPHILUS/L.BULGARICUS 1MM CELL
1 TABLET ORAL 2 TIMES DAILY
Status: DISPENSED | OUTPATIENT
Start: 2024-09-20

## 2024-09-20 ASSESSMENT — COGNITIVE AND FUNCTIONAL STATUS - GENERAL
MOBILITY SCORE: 24
DAILY ACTIVITIY SCORE: 24

## 2024-09-20 ASSESSMENT — PAIN SCALES - GENERAL
PAINLEVEL_OUTOF10: 7
PAINLEVEL_OUTOF10: 8
PAINLEVEL_OUTOF10: 7
PAINLEVEL_OUTOF10: 8
PAINLEVEL_OUTOF10: 8
PAINLEVEL_OUTOF10: 7
PAINLEVEL_OUTOF10: 9
PAINLEVEL_OUTOF10: 7
PAINLEVEL_OUTOF10: 7

## 2024-09-20 ASSESSMENT — PAIN DESCRIPTION - LOCATION
LOCATION: NECK

## 2024-09-20 ASSESSMENT — PAIN SCALES - WONG BAKER: WONGBAKER_NUMERICALRESPONSE: HURTS EVEN MORE

## 2024-09-20 ASSESSMENT — PAIN - FUNCTIONAL ASSESSMENT
PAIN_FUNCTIONAL_ASSESSMENT: 0-10
PAIN_FUNCTIONAL_ASSESSMENT: 0-10

## 2024-09-20 ASSESSMENT — PAIN DESCRIPTION - ORIENTATION: ORIENTATION: LEFT

## 2024-09-20 NOTE — CARE PLAN
The patient's goals for the shift include      The clinical goals for the shift include Pt will remain safe, HDS and free from injury and falls this shift..      Problem: Pain - Adult  Goal: Verbalizes/displays adequate comfort level or baseline comfort level  Outcome: Progressing     Problem: Safety - Adult  Goal: Free from fall injury  Outcome: Progressing     Problem: Discharge Planning  Goal: Discharge to home or other facility with appropriate resources  Outcome: Progressing     Problem: Chronic Conditions and Co-morbidities  Goal: Patient's chronic conditions and co-morbidity symptoms are monitored and maintained or improved  Outcome: Progressing     Problem: Pain  Goal: Takes deep breaths with improved pain control throughout the shift  Outcome: Progressing  Goal: Turns in bed with improved pain control throughout the shift  Outcome: Progressing  Goal: Walks with improved pain control throughout the shift  Outcome: Progressing  Goal: Performs ADL's with improved pain control throughout shift  Outcome: Progressing  Goal: Participates in PT with improved pain control throughout the shift  Outcome: Progressing  Goal: Free from opioid side effects throughout the shift  Outcome: Progressing  Goal: Free from acute confusion related to pain meds throughout the shift  Outcome: Progressing

## 2024-09-20 NOTE — CARE PLAN
The clinical goals for the shift include   Problem: Pain - Adult  Goal: Verbalizes/displays adequate comfort level or baseline comfort level  Outcome: Progressing     Problem: Safety - Adult  Goal: Free from fall injury  Outcome: Progressing     Problem: Discharge Planning  Goal: Discharge to home or other facility with appropriate resources  Outcome: Progressing     Problem: Chronic Conditions and Co-morbidities  Goal: Patient's chronic conditions and co-morbidity symptoms are monitored and maintained or improved  Outcome: Progressing     Problem: Pain  Goal: Takes deep breaths with improved pain control throughout the shift  Outcome: Progressing  Goal: Turns in bed with improved pain control throughout the shift  Outcome: Progressing  Goal: Walks with improved pain control throughout the shift  Outcome: Progressing  Goal: Performs ADL's with improved pain control throughout shift  Outcome: Progressing  Goal: Participates in PT with improved pain control throughout the shift  Outcome: Progressing  Goal: Free from opioid side effects throughout the shift  Outcome: Progressing  Goal: Free from acute confusion related to pain meds throughout the shift  Outcome: Progressing     Problem: Pain  Goal: Takes deep breaths with improved pain control throughout the shift  Outcome: Progressing  Goal: Turns in bed with improved pain control throughout the shift  Outcome: Progressing  Goal: Walks with improved pain control throughout the shift  Outcome: Progressing  Goal: Performs ADL's with improved pain control throughout shift  Outcome: Progressing  Goal: Participates in PT with improved pain control throughout the shift  Outcome: Progressing  Goal: Free from opioid side effects throughout the shift  Outcome: Progressing  Goal: Free from acute confusion related to pain meds throughout the shift  Outcome: Progressing

## 2024-09-20 NOTE — PROGRESS NOTES
Daily Progress Note    Radha Duncan is a 43 y.o. female on day 4 of admission presenting with Neck mass.    Subjective   No acute overnight events. Patient seen and examined at bedside. The patient says she feels her neck pain and swelling have improved since yesterday, rates her pain this morning at 7.5. Patient says she had few episodes of watery diarrhea last night, green to dark brown in color. Patient states she has had no change in difficulty swallowing, breathing, changes to voice. Patient was able to eat well yesterday. Patient denies CP/SOB, N/V/D, abdominal pain, LE swelling. No other complaints today.     Objective   Vitals: I/O:   Vitals:    09/20/24 0428   BP: 107/59   Pulse: 68   Resp: 18   Temp: 36.3 °C (97.3 °F)   SpO2: 97%        Temp  Min: 36.3 °C (97.3 °F)  Max: 36.7 °C (98.1 °F)  Pulse  Min: 68  Max: 89  BP  Min: 107/59  Max: 123/80  Resp  Min: 17  Max: 18  SpO2  Min: 97 %  Max: 99 %   Intake/Output Summary (Last 24 hours) at 9/20/2024 0924  Last data filed at 9/19/2024 2000  Gross per 24 hour   Intake 250 ml   Output --   Net 250 ml        Net IO Since Admission: 1,930 mL [09/20/24 0924]      Physical Exam  Constitutional:       General: She is not in acute distress.     Appearance: Normal appearance.   HENT:      Head: Normocephalic and atraumatic.   Eyes:      Comments: Mild proptosis   Neck:      Comments: L anterior neck mass, tender to touch, warm, solid.  Cardiovascular:      Rate and Rhythm: Normal rate and regular rhythm.      Heart sounds: Normal heart sounds.   Pulmonary:      Effort: Pulmonary effort is normal.      Breath sounds: Normal breath sounds.   Abdominal:      General: Abdomen is flat. There is no distension.      Palpations: Abdomen is soft.      Tenderness: There is no abdominal tenderness.   Musculoskeletal:         General: No swelling.   Skin:     General: Skin is warm and dry.   Neurological:      Mental Status: She is alert.   Psychiatric:         Mood and  Affect: Mood normal.         Behavior: Behavior normal.         Medications:  Scheduled Medications PRN Medications   acetaminophen, 975 mg, oral, TID  dexAMETHasone, 4 mg, intravenous, q8h  ferrous sulfate (325 mg ferrous sulfate), 65 mg of iron, oral, Daily  levoFLOXacin, 750 mg, intravenous, q24h  magnesium oxide, 400 mg, oral, Daily  ondansetron, 4 mg, intravenous, Once  polyethylene glycol, 17 g, oral, Daily      PRN medications: HYDROmorphone, loperamide, oxyCODONE      Continuous medications       Relevant Results:  Results from last 7 days   Lab Units 09/20/24  0732 09/19/24  0658 09/18/24  0743   WBC AUTO x10*3/uL 18.0* 11.4* 11.0   HEMOGLOBIN g/dL 7.7* 7.3* 7.9*   HEMATOCRIT % 26.2* 24.5* 27.9*   PLATELETS AUTO x10*3/uL 469* 394 366            Results from last 7 days   Lab Units 09/19/24  0658 09/18/24  0743 09/16/24  1008   SODIUM mmol/L 136 139 136   POTASSIUM mmol/L 3.6 3.4* 3.5   CHLORIDE mmol/L 100 101 101   CO2 mmol/L 28 28 24   BUN mg/dL 8 5* 6   CREATININE mg/dL 0.63 0.61 0.58   CALCIUM mg/dL 9.2 9.1 8.7     Results from last 7 days   Lab Units 09/19/24  0658 09/18/24  0743 09/16/24  1008   ALK PHOS U/L 68 65 74   BILIRUBIN TOTAL mg/dL 0.5 0.6 0.6   BILIRUBIN DIRECT mg/dL 0.2 0.2 0.2   PROTEIN TOTAL g/dL 7.2 7.4 7.8   ALT U/L 8 8 16   AST U/L 11 11 25           No orders to display          Assessment/Plan   Radhanando CraigrPetty is a 43 y.o. female with PMHx of L thyroid mass, fibroids, and anxiety/depression who presents as a transfer from [facility] ED for ENT evaluation for L anterior neck mass. CT neck showed large L thyroid mass with compressive symptoms. TSH 0.67, repeat TSH 0.26 w/T4 1.29 N, TPO <28 WNL, thyroglobulin pending (>3500 on 09/03.). Ddx include subacute thyroiditis due to presenting sx of significant neck/tenderness, low TSH, elevated CRP & ESR, leukocytosis, and mild anemia in setting of 4-5 month hx of goiter. Based on CT scan showing significant swelling of goiter and  encroachment on trachea, there is concern for progression to airway compromise. ENT consulted to evaluate best route possible in terms of removal of mass vs biopsy and scope to determine underlying cause. WBCs decreased on day 2 of admission to 11.0, possibly indicating improvement of infection due to antibiotics. However, thyroiditis does not seem to be improving based on patient's continued severe pain. This may indicate thyroiditis is not infectious in nature, but etiology of inflammatory process is unclear.    Updates (09/20/24):  Patient currently receiving IV levofloxacin 750 on day 4, dexamethasone IV 4 mg q8H on day 2, and oral iron 325 mg  Patient's neck pain has improved  ENT continuing to follow, recs continuing IV antibiotics and then beginning short course steroids to reduce inflammation before any type of intervention  Endo continuing to follow - patient's thyroid nodule has grown 44% in volume over past 2 months; multinodular goiter with superimposed thyroiditis  Discussed with patient concern for compression of airway; alert team for signs of respiratory distress, wheezing, or stridor    #Multinodular goiter with Compressive sx  :: Hx of goiter neck mass since May 2024, being evaluated by ENT outpatient; patient's thyroid nodule has grown 44% in volume over past 2 months  :: CT scan shows Large left thyroid lobe mass measuring 7.0 x 6.3 x 10.5 cm resulting  in significant rightward deviation of the aerodigestive tract and leftward deviation of the carotid space structures, mild narrowing of trachea at level of thoracic inlet; Findings are most consistent with thyroid goiter, but superimposed malignancy  within the goiter mass is not excluded.  - ENT consulted - surgery cannot be done until neck inflammation is reduced, IV dexamethasone started yesterday  - Endo consulted - superimposed thyroiditis  - Maintain pain control: tylenol 975 mg PO TID, oxy 10 mg q4h prn; IV dilaudid 0.4 mg, naproxen 500  mg  - Transition to PO steroids once patient's pain is fully under control  - Follow up with ENT outpatient     #Leukocytosis WBC 17.4 on 9/17 (WBC 4.8 on 9/3/24), unclear etiology  #C/f superimposed thyroiditis, unclear type  :: S/p vancomycin in ED; S/p course of cefdinir and flagyl OP x 5 days; Persistent leukocytosis despite abx therapy; WBC normal earlier this month  :: Blood cultures x2 NGTD, urine culture NGTD  :: UA showed negative leukocyte esterase and nitrites, 1+ blood, 2+ protein (hx of fibroids)  :: Free thyroxine WNL, TPO antibody normal  :: WBCs increased to 18.0 (patient getting steroids)  - patient currently on day 4 of Levofloxacin 750 daily x 5d  - Continue with steroid course     #Microcytic Anemia  :: Hb 8.8 MCV 69. Was 7.8 previously.  - LYNNE workup, patient receiving oral iron  - Trend CBC      IVF: PRN  Electrolytes: K>4, P>3, Mg>2  Access: PIV    Diet: NPO Diet Except: Sips with meds; Effective midnight   DVT Ppx: waiting to hear ENT evaluation based on intervention  GI Ppx: Not indicated  Bowel regimen: Miralax  Pain management: tylenol 975 mg TID, oxycodone 10mg q6H PRN, dilaudid injection 0.4mg q4H PRN, toradol 30 mg PRN    Extended Emergency Contact Information  Primary Emergency Contact: Adan Hernadez  Home Phone: [phone]  Relation: Spouse  Secondary Emergency Contact: Meg Jorgensen   [facility]  Home Phone: [phone]  Relation: None   Code status: Full Code (confirmed on admission)  PT/OT: [ordered, as able]  Disposition: discharge to home when medically ready    BARB GORDILLO, MS3

## 2024-09-21 LAB
ALBUMIN SERPL BCP-MCNC: 3.4 G/DL (ref 3.4–5)
ALP SERPL-CCNC: 70 U/L (ref 33–110)
ALT SERPL W P-5'-P-CCNC: 14 U/L (ref 7–45)
ANION GAP SERPL CALC-SCNC: 15 MMOL/L (ref 10–20)
AST SERPL W P-5'-P-CCNC: 14 U/L (ref 9–39)
BACTERIA BLD CULT: NORMAL
BACTERIA BLD CULT: NORMAL
BASOPHILS # BLD MANUAL: 0 X10*3/UL (ref 0–0.1)
BASOPHILS NFR BLD MANUAL: 0 %
BILIRUB DIRECT SERPL-MCNC: 0.1 MG/DL (ref 0–0.3)
BILIRUB SERPL-MCNC: 0.3 MG/DL (ref 0–1.2)
BUN SERPL-MCNC: 11 MG/DL (ref 6–23)
CALCIUM SERPL-MCNC: 9.8 MG/DL (ref 8.6–10.6)
CHLORIDE SERPL-SCNC: 101 MMOL/L (ref 98–107)
CO2 SERPL-SCNC: 26 MMOL/L (ref 21–32)
CREAT SERPL-MCNC: 0.64 MG/DL (ref 0.5–1.05)
EGFRCR SERPLBLD CKD-EPI 2021: >90 ML/MIN/1.73M*2
EOSINOPHIL # BLD MANUAL: 0 X10*3/UL (ref 0–0.7)
EOSINOPHIL NFR BLD MANUAL: 0 %
ERYTHROCYTE [DISTWIDTH] IN BLOOD BY AUTOMATED COUNT: 21.8 % (ref 11.5–14.5)
GLUCOSE SERPL-MCNC: 127 MG/DL (ref 74–99)
HCT VFR BLD AUTO: 28.7 % (ref 36–46)
HGB BLD-MCNC: 8.2 G/DL (ref 12–16)
HYPOCHROMIA BLD QL SMEAR: ABNORMAL
IMM GRANULOCYTES # BLD AUTO: 0.52 X10*3/UL (ref 0–0.7)
IMM GRANULOCYTES NFR BLD AUTO: 2 % (ref 0–0.9)
LYMPHOCYTES # BLD MANUAL: 0.86 X10*3/UL (ref 1.2–4.8)
LYMPHOCYTES NFR BLD MANUAL: 3.3 %
MAGNESIUM SERPL-MCNC: 2.09 MG/DL (ref 1.6–2.4)
MCH RBC QN AUTO: 20.1 PG (ref 26–34)
MCHC RBC AUTO-ENTMCNC: 28.6 G/DL (ref 32–36)
MCV RBC AUTO: 71 FL (ref 80–100)
MONOCYTES # BLD MANUAL: 0.21 X10*3/UL (ref 0.1–1)
MONOCYTES NFR BLD MANUAL: 0.8 %
MYELOCYTES # BLD MANUAL: 0.21 X10*3/UL
MYELOCYTES NFR BLD MANUAL: 0.8 %
NEUTROPHILS # BLD MANUAL: 24.92 X10*3/UL (ref 1.2–7.7)
NEUTS BAND # BLD MANUAL: 0.45 X10*3/UL (ref 0–0.7)
NEUTS BAND NFR BLD MANUAL: 1.7 %
NEUTS SEG # BLD MANUAL: 24.47 X10*3/UL (ref 1.2–7)
NEUTS SEG NFR BLD MANUAL: 93.4 %
NRBC BLD-RTO: 0.2 /100 WBCS (ref 0–0)
PHOSPHATE SERPL-MCNC: 3.6 MG/DL (ref 2.5–4.9)
PLATELET # BLD AUTO: 676 X10*3/UL (ref 150–450)
POLYCHROMASIA BLD QL SMEAR: ABNORMAL
POTASSIUM SERPL-SCNC: 4 MMOL/L (ref 3.5–5.3)
PROT SERPL-MCNC: 8.2 G/DL (ref 6.4–8.2)
RBC # BLD AUTO: 4.07 X10*6/UL (ref 4–5.2)
RBC MORPH BLD: ABNORMAL
SCHISTOCYTES BLD QL SMEAR: ABNORMAL
SODIUM SERPL-SCNC: 138 MMOL/L (ref 136–145)
TOTAL CELLS COUNTED BLD: 120
WBC # BLD AUTO: 26.2 X10*3/UL (ref 4.4–11.3)

## 2024-09-21 PROCEDURE — 2500000001 HC RX 250 WO HCPCS SELF ADMINISTERED DRUGS (ALT 637 FOR MEDICARE OP)

## 2024-09-21 PROCEDURE — 36415 COLL VENOUS BLD VENIPUNCTURE: CPT

## 2024-09-21 PROCEDURE — 2500000004 HC RX 250 GENERAL PHARMACY W/ HCPCS (ALT 636 FOR OP/ED): Performed by: STUDENT IN AN ORGANIZED HEALTH CARE EDUCATION/TRAINING PROGRAM

## 2024-09-21 PROCEDURE — 82248 BILIRUBIN DIRECT: CPT

## 2024-09-21 PROCEDURE — 2500000004 HC RX 250 GENERAL PHARMACY W/ HCPCS (ALT 636 FOR OP/ED)

## 2024-09-21 PROCEDURE — 84100 ASSAY OF PHOSPHORUS: CPT

## 2024-09-21 PROCEDURE — 85027 COMPLETE CBC AUTOMATED: CPT

## 2024-09-21 PROCEDURE — 84075 ASSAY ALKALINE PHOSPHATASE: CPT

## 2024-09-21 PROCEDURE — 83735 ASSAY OF MAGNESIUM: CPT

## 2024-09-21 PROCEDURE — 1100000001 HC PRIVATE ROOM DAILY

## 2024-09-21 PROCEDURE — 80048 BASIC METABOLIC PNL TOTAL CA: CPT

## 2024-09-21 PROCEDURE — 99233 SBSQ HOSP IP/OBS HIGH 50: CPT

## 2024-09-21 PROCEDURE — 85007 BL SMEAR W/DIFF WBC COUNT: CPT

## 2024-09-21 RX ORDER — PANTOPRAZOLE SODIUM 40 MG/1
40 TABLET, DELAYED RELEASE ORAL
Status: DISPENSED | OUTPATIENT
Start: 2024-09-21

## 2024-09-21 RX ORDER — GABAPENTIN 100 MG/1
100 CAPSULE ORAL EVERY 8 HOURS SCHEDULED
Status: DISCONTINUED | OUTPATIENT
Start: 2024-09-21 | End: 2024-09-22

## 2024-09-21 RX ORDER — NAPROXEN 250 MG/1
250 TABLET ORAL
Status: DISPENSED | OUTPATIENT
Start: 2024-09-21

## 2024-09-21 RX ORDER — CYCLOBENZAPRINE HCL 10 MG
5 TABLET ORAL 3 TIMES DAILY
Status: DISCONTINUED | OUTPATIENT
Start: 2024-09-21 | End: 2024-09-22

## 2024-09-21 RX ORDER — PREDNISONE 20 MG/1
40 TABLET ORAL DAILY
Status: DISPENSED | OUTPATIENT
Start: 2024-09-21

## 2024-09-21 ASSESSMENT — COGNITIVE AND FUNCTIONAL STATUS - GENERAL
DAILY ACTIVITIY SCORE: 24
MOBILITY SCORE: 24

## 2024-09-21 ASSESSMENT — PAIN SCALES - GENERAL
PAINLEVEL_OUTOF10: 7
PAINLEVEL_OUTOF10: 9
PAINLEVEL_OUTOF10: 8
PAINLEVEL_OUTOF10: 5 - MODERATE PAIN
PAINLEVEL_OUTOF10: 7
PAINLEVEL_OUTOF10: 7

## 2024-09-21 ASSESSMENT — PAIN DESCRIPTION - LOCATION
LOCATION: NECK
LOCATION: NECK

## 2024-09-21 NOTE — PROGRESS NOTES
Daily Progress Note    Radha Duncan is a 43 y.o. female on day 5 of admission presenting with Neck mass.    Subjective   NAEON. This morning patient reports that she is trying to avoid using her dilaudid as she realizes it is a barrier to discharge. Discussed that if her pain is uncontrolled to let us know and not be afraid to use her medications if she feels she is pushing her limits. Discussed transitioning her to oral steroids today. No airway concerns or new complaints; continues to tolerate PO. Patient however was tearful when discussing her pain and its limited resolution.         Objective   Vitals: I/O:   Vitals:    09/21/24 0606   BP: 140/80   Pulse: 64   Resp: 18   Temp: 35.8 °C (96.4 °F)   SpO2: 96%        Temp  Min: 35.8 °C (96.4 °F)  Max: 36.4 °C (97.5 °F)  Pulse  Min: 64  Max: 72  BP  Min: 105/68  Max: 140/80  Resp  Min: 16  Max: 18  SpO2  Min: 96 %  Max: 99 %   Intake/Output Summary (Last 24 hours) at 9/21/2024 1313  Last data filed at 9/21/2024 0354  Gross per 24 hour   Intake 300 ml   Output --   Net 300 ml        Net IO Since Admission: 2,470 mL [09/21/24 1313]      Physical Exam  Constitutional:       General: She is not in acute distress.     Appearance: Normal appearance. She is not toxic-appearing.   HENT:      Head: Normocephalic and atraumatic.   Neck:      Comments: L anterior neck mass, tender to touch, warm, solid.  Cardiovascular:      Rate and Rhythm: Normal rate and regular rhythm.      Heart sounds: Normal heart sounds.   Pulmonary:      Effort: Pulmonary effort is normal.      Breath sounds: Normal breath sounds.   Abdominal:      General: Abdomen is flat. There is no distension.      Palpations: Abdomen is soft.      Tenderness: There is no abdominal tenderness.   Musculoskeletal:         General: No swelling.   Skin:     General: Skin is warm and dry.   Neurological:      Mental Status: She is alert.   Psychiatric:         Mood and Affect: Mood normal.         Behavior:  Behavior normal.         Medications:  Scheduled Medications PRN Medications   acetaminophen, 975 mg, oral, TID  cyclobenzaprine, 5 mg, oral, TID  ferrous sulfate (325 mg ferrous sulfate), 65 mg of iron, oral, Daily  gabapentin, 100 mg, oral, q8h GIULIANA  lactobacillus acidophilus, 1 tablet, oral, BID  naproxen, 250 mg, oral, BID  ondansetron, 4 mg, intravenous, Once  pantoprazole, 40 mg, oral, Daily before breakfast  polyethylene glycol, 17 g, oral, Daily  predniSONE, 40 mg, oral, Daily      PRN medications: HYDROmorphone, loperamide, oxyCODONE      Continuous medications       Relevant Results:  Results from last 7 days   Lab Units 09/21/24  0825 09/20/24  0732 09/19/24  0658   WBC AUTO x10*3/uL 26.2* 18.0* 11.4*   HEMOGLOBIN g/dL 8.2* 7.7* 7.3*   HEMATOCRIT % 28.7* 26.2* 24.5*   PLATELETS AUTO x10*3/uL 676* 469* 394            Results from last 7 days   Lab Units 09/21/24  0825 09/20/24  0732 09/19/24  0658   SODIUM mmol/L 138 136 136   POTASSIUM mmol/L 4.0 4.5 3.6   CHLORIDE mmol/L 101 100 100   CO2 mmol/L 26 25 28   BUN mg/dL 11 11 8   CREATININE mg/dL 0.64 0.64 0.63   CALCIUM mg/dL 9.8 10.0 9.2     Results from last 7 days   Lab Units 09/21/24  0825 09/20/24  0732 09/19/24  0658   ALK PHOS U/L 70 67 68   BILIRUBIN TOTAL mg/dL 0.3 0.4 0.5   BILIRUBIN DIRECT mg/dL 0.1 0.1 0.2   PROTEIN TOTAL g/dL 8.2 7.7 7.2   ALT U/L 14 11 8   AST U/L 14 14 11           No orders to display          Assessment/Plan   Radha Dakota is a 43 y.o. female with PMHx of L thyroid mass, fibroids, and anxiety/depression who presents as a transfer from [facility] ED for ENT evaluation for L anterior neck mass. CT neck showed large L thyroid mass with compressive symptoms. TSH 0.67, repeat TSH 0.26 w/T4 1.29 N, TPO <28 WNL, thyroglobulin pending (>3500 on 09/03.). Ddx include subacute thyroiditis due to presenting sx of significant neck/tenderness, low TSH, elevated CRP & ESR, leukocytosis, and mild anemia in setting of 4-5 month hx of  goiter. Based on CT scan showing significant swelling of goiter and encroachment on trachea, there is concern for progression to airway compromise. ENT consulted to evaluate best route possible in terms of removal of mass vs biopsy and scope to determine underlying cause. WBCs decreased on day 2 of admission to 11.0, possibly indicating improvement of infection due to antibiotics. However, thyroiditis does not seem to be improving based on patient's continued severe pain. This may indicate thyroiditis is not infectious in nature, but etiology of inflammatory process is unclear.    Updates (09/21/24):  Competed IV levofloxacin course  Dexamethasone IV 4 mg q8H to oral Prednisone 40mg  Patient's neck pain worsened compared to yesterday  Naproxen 250 BID  Gabapentin 100 TID  Flexeril 5 TID      #Multinodular goiter with Compressive sx  :: Hx of goiter neck mass since May 2024, being evaluated by ENT outpatient; patient's thyroid nodule has grown 44% in volume over past 2 months  :: CT scan shows Large left thyroid lobe mass measuring 7.0 x 6.3 x 10.5 cm resulting  in significant rightward deviation of the aerodigestive tract and leftward deviation of the carotid space structures, mild narrowing of trachea at level of thoracic inlet; Findings are most consistent with thyroid goiter, but superimposed malignancy  within the goiter mass is not excluded.  ENT continuing to follow, recs continuing IV antibiotics and then beginning short course steroids to reduce inflammation before any type of intervention  Endo continuing to follow - patient's thyroid nodule has grown 44% in volume over past 2 months; multinodular goiter with superimposed thyroiditis  - Maintain pain control: tylenol 975 mg PO TID, oxy 10 mg q4h prn; IV dilaudid 0.4 mg, naproxen 500 mg  - Transition to PO steroids once patient's pain is fully under control  - Follow up with ENT outpatient     #Leukocytosis WBC 17.4 on 9/17 (WBC 4.8 on 9/3/24), unclear  etiology  #C/f superimposed thyroiditis, unclear type  :: S/p vancomycin in ED; S/p course of cefdinir and flagyl OP x 5 days; Persistent leukocytosis despite abx therapy; WBC normal earlier this month  :: Blood cultures x2 NGTD, urine culture NGTD  :: UA showed negative leukocyte esterase and nitrites, 1+ blood, 2+ protein (hx of fibroids)  :: Free thyroxine WNL, TPO antibody normal  :: WBCs increased to 18.0 (patient getting steroids)  - patient currently on day 4 of Levofloxacin 750 daily x 5d  - Continue with steroid course     #Microcytic Anemia  :: Hb 8.8 MCV 69. Was 7.8 previously.  - LYNNE workup, patient receiving oral iron  - Trend CBC      IVF: PRN  Electrolytes: K>4, P>3, Mg>2  Access: PIV    Diet: NPO Diet Except: Sips with meds; Effective midnight   DVT Ppx: waiting to hear ENT evaluation based on intervention  GI Ppx: Not indicated  Bowel regimen: Miralax  Pain management: tylenol 975 mg TID, oxycodone 10mg q6H PRN, dilaudid injection 0.4mg q4H PRN, toradol 30 mg PRN    Extended Emergency Contact Information  Primary Emergency Contact: Adan Hernadez  Home Phone: [phone]  Relation: Spouse  Secondary Emergency Contact: Meg Jorgensen   [facility]  Home Phone: [phone]  Relation: None   Code status: Full Code (confirmed on admission)  PT/OT: [ordered, as able]  Disposition: discharge to home when medically ready    Oscar Vallejo MD  Internal Medicine PGY-II

## 2024-09-22 VITALS
HEART RATE: 67 BPM | SYSTOLIC BLOOD PRESSURE: 102 MMHG | RESPIRATION RATE: 18 BRPM | OXYGEN SATURATION: 95 % | DIASTOLIC BLOOD PRESSURE: 62 MMHG | BODY MASS INDEX: 38.57 KG/M2 | TEMPERATURE: 97.5 F | HEIGHT: 66 IN | WEIGHT: 240 LBS

## 2024-09-22 LAB
ALBUMIN SERPL BCP-MCNC: 3 G/DL (ref 3.4–5)
ALP SERPL-CCNC: 59 U/L (ref 33–110)
ALT SERPL W P-5'-P-CCNC: 12 U/L (ref 7–45)
ANION GAP SERPL CALC-SCNC: 11 MMOL/L (ref 10–20)
AST SERPL W P-5'-P-CCNC: 15 U/L (ref 9–39)
BASOPHILS # BLD MANUAL: 0.18 X10*3/UL (ref 0–0.1)
BASOPHILS NFR BLD MANUAL: 0.9 %
BILIRUB DIRECT SERPL-MCNC: 0.1 MG/DL (ref 0–0.3)
BILIRUB SERPL-MCNC: 0.3 MG/DL (ref 0–1.2)
BUN SERPL-MCNC: 15 MG/DL (ref 6–23)
CALCIUM SERPL-MCNC: 9.1 MG/DL (ref 8.6–10.6)
CHLORIDE SERPL-SCNC: 103 MMOL/L (ref 98–107)
CO2 SERPL-SCNC: 29 MMOL/L (ref 21–32)
CREAT SERPL-MCNC: 0.61 MG/DL (ref 0.5–1.05)
EGFRCR SERPLBLD CKD-EPI 2021: >90 ML/MIN/1.73M*2
EOSINOPHIL # BLD MANUAL: 0 X10*3/UL (ref 0–0.7)
EOSINOPHIL NFR BLD MANUAL: 0 %
ERYTHROCYTE [DISTWIDTH] IN BLOOD BY AUTOMATED COUNT: 22.2 % (ref 11.5–14.5)
GLUCOSE SERPL-MCNC: 81 MG/DL (ref 74–99)
HCT VFR BLD AUTO: 27.3 % (ref 36–46)
HGB BLD-MCNC: 7.7 G/DL (ref 12–16)
HYPOCHROMIA BLD QL SMEAR: ABNORMAL
IMM GRANULOCYTES # BLD AUTO: 0.6 X10*3/UL (ref 0–0.7)
IMM GRANULOCYTES NFR BLD AUTO: 2.9 % (ref 0–0.9)
LYMPHOCYTES # BLD MANUAL: 3.37 X10*3/UL (ref 1.2–4.8)
LYMPHOCYTES NFR BLD MANUAL: 16.5 %
MAGNESIUM SERPL-MCNC: 2.07 MG/DL (ref 1.6–2.4)
MCH RBC QN AUTO: 19.9 PG (ref 26–34)
MCHC RBC AUTO-ENTMCNC: 28.2 G/DL (ref 32–36)
MCV RBC AUTO: 71 FL (ref 80–100)
MONOCYTES # BLD MANUAL: 1.24 X10*3/UL (ref 0.1–1)
MONOCYTES NFR BLD MANUAL: 6.1 %
NEUTS SEG # BLD MANUAL: 15.61 X10*3/UL (ref 1.2–7)
NEUTS SEG NFR BLD MANUAL: 76.5 %
NRBC BLD-RTO: 0.5 /100 WBCS (ref 0–0)
PHOSPHATE SERPL-MCNC: 3.5 MG/DL (ref 2.5–4.9)
PLATELET # BLD AUTO: 690 X10*3/UL (ref 150–450)
POTASSIUM SERPL-SCNC: 4.2 MMOL/L (ref 3.5–5.3)
PROT SERPL-MCNC: 6.8 G/DL (ref 6.4–8.2)
RBC # BLD AUTO: 3.87 X10*6/UL (ref 4–5.2)
RBC MORPH BLD: ABNORMAL
SODIUM SERPL-SCNC: 139 MMOL/L (ref 136–145)
TARGETS BLD QL SMEAR: ABNORMAL
TOTAL CELLS COUNTED BLD: 115
WBC # BLD AUTO: 20.4 X10*3/UL (ref 4.4–11.3)

## 2024-09-22 PROCEDURE — 36415 COLL VENOUS BLD VENIPUNCTURE: CPT

## 2024-09-22 PROCEDURE — 99233 SBSQ HOSP IP/OBS HIGH 50: CPT

## 2024-09-22 PROCEDURE — 2500000004 HC RX 250 GENERAL PHARMACY W/ HCPCS (ALT 636 FOR OP/ED)

## 2024-09-22 PROCEDURE — 83735 ASSAY OF MAGNESIUM: CPT

## 2024-09-22 PROCEDURE — 85027 COMPLETE CBC AUTOMATED: CPT

## 2024-09-22 PROCEDURE — 84100 ASSAY OF PHOSPHORUS: CPT

## 2024-09-22 PROCEDURE — 85007 BL SMEAR W/DIFF WBC COUNT: CPT

## 2024-09-22 PROCEDURE — 82248 BILIRUBIN DIRECT: CPT

## 2024-09-22 PROCEDURE — 2500000001 HC RX 250 WO HCPCS SELF ADMINISTERED DRUGS (ALT 637 FOR MEDICARE OP)

## 2024-09-22 PROCEDURE — 2500000005 HC RX 250 GENERAL PHARMACY W/O HCPCS

## 2024-09-22 PROCEDURE — 1100000001 HC PRIVATE ROOM DAILY

## 2024-09-22 PROCEDURE — 80076 HEPATIC FUNCTION PANEL: CPT

## 2024-09-22 RX ORDER — CYCLOBENZAPRINE HCL 10 MG
5 TABLET ORAL NIGHTLY
Status: DISCONTINUED | OUTPATIENT
Start: 2024-09-22 | End: 2024-09-22

## 2024-09-22 RX ORDER — DICLOFENAC SODIUM 10 MG/G
4 GEL TOPICAL 4 TIMES DAILY PRN
Status: DISPENSED | OUTPATIENT
Start: 2024-09-22

## 2024-09-22 RX ORDER — GABAPENTIN 100 MG/1
200 CAPSULE ORAL EVERY 8 HOURS SCHEDULED
Status: DISPENSED | OUTPATIENT
Start: 2024-09-22

## 2024-09-22 RX ORDER — LIDOCAINE 560 MG/1
1 PATCH PERCUTANEOUS; TOPICAL; TRANSDERMAL DAILY
Status: DISPENSED | OUTPATIENT
Start: 2024-09-22

## 2024-09-22 ASSESSMENT — COGNITIVE AND FUNCTIONAL STATUS - GENERAL
MOBILITY SCORE: 24
DAILY ACTIVITIY SCORE: 24

## 2024-09-22 ASSESSMENT — PAIN DESCRIPTION - LOCATION
LOCATION: NECK

## 2024-09-22 ASSESSMENT — PAIN SCALES - GENERAL
PAINLEVEL_OUTOF10: 7
PAINLEVEL_OUTOF10: 8
PAINLEVEL_OUTOF10: 8
PAINLEVEL_OUTOF10: 7
PAINLEVEL_OUTOF10: 5 - MODERATE PAIN
PAINLEVEL_OUTOF10: 8
PAINLEVEL_OUTOF10: 7
PAINLEVEL_OUTOF10: 7
PAINLEVEL_OUTOF10: 8
PAINLEVEL_OUTOF10: 8

## 2024-09-22 ASSESSMENT — PAIN - FUNCTIONAL ASSESSMENT
PAIN_FUNCTIONAL_ASSESSMENT: 0-10

## 2024-09-22 ASSESSMENT — PAIN SCALES - PAIN ASSESSMENT IN ADVANCED DEMENTIA (PAINAD): TOTALSCORE: MEDICATION (SEE MAR)

## 2024-09-22 NOTE — CARE PLAN
The patient's goals for the shift include      The clinical goals for the shift include pt will remain safe and free from injury and falls throughout shift    Over the shift, the patient did not make progress toward the following goals. Barriers to progression include . Recommendations to address these barriers include   Problem: Pain - Adult  Goal: Verbalizes/displays adequate comfort level or baseline comfort level  9/21/2024 2226 by Jessica Mendieta RN  Outcome: Progressing  9/21/2024 2226 by Jessica Mendieta RN  Outcome: Progressing     Problem: Safety - Adult  Goal: Free from fall injury  9/21/2024 2226 by Jessica Mendieta RN  Outcome: Progressing  9/21/2024 2226 by Jessica Mendieta RN  Outcome: Progressing     Problem: Discharge Planning  Goal: Discharge to home or other facility with appropriate resources  9/21/2024 2226 by Jessica Mendieta RN  Outcome: Progressing  9/21/2024 2226 by Jessica Mendieta RN  Outcome: Progressing     Problem: Chronic Conditions and Co-morbidities  Goal: Patient's chronic conditions and co-morbidity symptoms are monitored and maintained or improved  9/21/2024 2226 by Jessica Mendieta RN  Outcome: Progressing  9/21/2024 2226 by Jessica Mendieta RN  Outcome: Progressing     Problem: Pain  Goal: Takes deep breaths with improved pain control throughout the shift  9/21/2024 2226 by Jessica Mendieta RN  Outcome: Progressing  9/21/2024 2226 by Jessica Mendieta RN  Outcome: Progressing     Problem: Pain  Goal: Turns in bed with improved pain control throughout the shift  9/21/2024 2226 by Jessica Mendieta RN  Outcome: Progressing  9/21/2024 2226 by Jessica Mendieta RN  Outcome: Progressing     Problem: Pain  Goal: Walks with improved pain control throughout the shift  9/21/2024 2226 by Jessica Mendieta RN  Outcome: Progressing  9/21/2024 2226 by Jessica Mendieta RN  Outcome: Progressing     Problem: Pain  Goal: Performs ADL's with improved pain control throughout shift  9/21/2024 2226 by  Jessica Mendieta RN  Outcome: Progressing  9/21/2024 2226 by Jessica Mendieta RN  Outcome: Progressing     Problem: Pain  Goal: Participates in PT with improved pain control throughout the shift  9/21/2024 2226 by Jessica Mendieta RN  Outcome: Progressing  9/21/2024 2226 by Jessica Mendieta RN  Outcome: Progressing     Problem: Pain  Goal: Free from opioid side effects throughout the shift  9/21/2024 2226 by Jessica Mendieta RN  Outcome: Progressing  9/21/2024 2226 by Jessica Mendieta RN  Outcome: Progressing     Problem: Pain  Goal: Free from acute confusion related to pain meds throughout the shift  9/21/2024 2226 by Jessica Mendieta RN  Outcome: Progressing  9/21/2024 2226 by Jessica Mendieta RN  Outcome: Progressing   .

## 2024-09-22 NOTE — PROGRESS NOTES
Daily Progress Note    Radha Duncan is a 43 y.o. female on day 6 of admission presenting with Neck mass.    Subjective   NAEON.  No acute events overnight.  Patient reports this morning that she still having pain especially even after the changes to her pain regimen.  She reports minimal to moderate control for oxy and continues to try to avoid Dilaudid IV.  Reports that she had mild improvement after taking gabapentin, discussed increasing the dose.  Discussed adding topical analgesics to her regimen with hopes that this helps her pain.  Reports drowsiness from the muscle relaxer, will discontinue going forward.  Continues to deny any airway concerns or new complaints.  Tolerates p.o. without issue.         Objective   Vitals: I/O:   Vitals:    09/22/24 0500   BP: 133/83   Pulse: 71   Resp: 16   Temp: 36.6 °C (97.9 °F)   SpO2: 96%        Temp  Min: 36 °C (96.8 °F)  Max: 36.6 °C (97.9 °F)  Pulse  Min: 68  Max: 78  BP  Min: 120/84  Max: 143/83  Resp  Min: 16  Max: 18  SpO2  Min: 96 %  Max: 98 % No intake or output data in the 24 hours ending 09/22/24 1156       Net IO Since Admission: 2,470 mL [09/22/24 1156]      Physical Exam  Constitutional:       General: She is not in acute distress.     Appearance: Normal appearance. She is not toxic-appearing.   HENT:      Head: Normocephalic and atraumatic.   Neck:      Comments: L anterior neck mass, tender to touch, warm, solid.  Cardiovascular:      Rate and Rhythm: Normal rate and regular rhythm.      Heart sounds: Normal heart sounds.   Pulmonary:      Effort: Pulmonary effort is normal.      Breath sounds: Normal breath sounds.   Abdominal:      General: Abdomen is flat. There is no distension.      Palpations: Abdomen is soft.      Tenderness: There is no abdominal tenderness.   Musculoskeletal:         General: No swelling.   Skin:     General: Skin is warm and dry.   Neurological:      Mental Status: She is alert.   Psychiatric:         Mood and Affect: Mood  normal.         Behavior: Behavior normal.         Medications:  Scheduled Medications PRN Medications   acetaminophen, 975 mg, oral, TID  ferrous sulfate (325 mg ferrous sulfate), 65 mg of iron, oral, Daily  gabapentin, 200 mg, oral, q8h GIULIANA  lactobacillus acidophilus, 1 tablet, oral, BID  lidocaine, 1 patch, transdermal, Daily  naproxen, 250 mg, oral, BID  ondansetron, 4 mg, intravenous, Once  pantoprazole, 40 mg, oral, Daily before breakfast  predniSONE, 40 mg, oral, Daily      PRN medications: diclofenac sodium, HYDROmorphone, loperamide, oxyCODONE      Continuous medications       Relevant Results:  Results from last 7 days   Lab Units 09/22/24  0854 09/21/24  0825 09/20/24  0732   WBC AUTO x10*3/uL 20.4* 26.2* 18.0*   HEMOGLOBIN g/dL 7.7* 8.2* 7.7*   HEMATOCRIT % 27.3* 28.7* 26.2*   PLATELETS AUTO x10*3/uL 690* 676* 469*            Results from last 7 days   Lab Units 09/22/24  0854 09/21/24  0825 09/20/24  0732   SODIUM mmol/L 139 138 136   POTASSIUM mmol/L 4.2 4.0 4.5   CHLORIDE mmol/L 103 101 100   CO2 mmol/L 29 26 25   BUN mg/dL 15 11 11   CREATININE mg/dL 0.61 0.64 0.64   CALCIUM mg/dL 9.1 9.8 10.0     Results from last 7 days   Lab Units 09/22/24  0854 09/21/24  0825 09/20/24  0732   ALK PHOS U/L 59 70 67   BILIRUBIN TOTAL mg/dL 0.3 0.3 0.4   BILIRUBIN DIRECT mg/dL 0.1 0.1 0.1   PROTEIN TOTAL g/dL 6.8 8.2 7.7   ALT U/L 12 14 11   AST U/L 15 14 14           No orders to display          Assessment/Plan   Radha Dakota is a 43 y.o. female with PMHx of L thyroid mass, fibroids, and anxiety/depression who presents as a transfer from [facility] ED for ENT evaluation for L anterior neck mass. CT neck showed large L thyroid mass with compressive symptoms. TSH 0.67, repeat TSH 0.26 w/T4 1.29 N, TPO <28 WNL, thyroglobulin pending (>3500 on 09/03.). Ddx include subacute thyroiditis due to presenting sx of significant neck/tenderness, low TSH, elevated CRP & ESR, leukocytosis, and mild anemia in setting of  4-5 month hx of goiter. Based on CT scan showing significant swelling of goiter and encroachment on trachea, there is concern for progression to airway compromise. ENT consulted to evaluate best route possible in terms of removal of mass vs biopsy and scope to determine underlying cause. WBCs decreased on day 2 of admission to 11.0, possibly indicating improvement of infection due to antibiotics. However, thyroiditis does not seem to be improving based on patient's continued severe pain. This may indicate thyroiditis is not infectious in nature, but etiology of inflammatory process is unclear.    Updates (09/22/24):  Competed IV levofloxacin course 9/21  Continue oral prednisone 40mg  Patient's neck pain unchanged compared to yesterday;   Continue naproxen 250 BID (restarted 9/21)  Increase gabapentin to 200 TID  Discontinue Flexeril 5 TID  Had topical diclofenac gel, and lidocaine patch  If no further improvement in pain will reengage with ENT tomorrow      #Multinodular goiter with Compressive sx  :: Hx of goiter neck mass since May 2024, being evaluated by ENT outpatient; patient's thyroid nodule has grown 44% in volume over past 2 months  :: CT scan shows Large left thyroid lobe mass measuring 7.0 x 6.3 x 10.5 cm resulting  in significant rightward deviation of the aerodigestive tract and leftward deviation of the carotid space structures, mild narrowing of trachea at level of thoracic inlet; Findings are most consistent with thyroid goiter, but superimposed malignancy  within the goiter mass is not excluded.  ENT continuing to follow, recs continuing IV antibiotics and then beginning short course steroids to reduce inflammation before any type of intervention  Endo continuing to follow - patient's thyroid nodule has grown 44% in volume over past 2 months; multinodular goiter with superimposed thyroiditis  - Maintain pain control: tylenol 975 mg PO TID, oxy 10 mg q4h prn; IV dilaudid 0.4 mg, naproxen 500 mg  -  Transition to PO steroids once patient's pain is fully under control  - Follow up with ENT outpatient     #Leukocytosis WBC 17.4 on 9/17 (WBC 4.8 on 9/3/24), unclear etiology  #C/f superimposed thyroiditis, unclear type  :: S/p vancomycin in ED; S/p course of cefdinir and flagyl OP x 5 days; Persistent leukocytosis despite abx therapy; WBC normal earlier this month  :: Blood cultures x2 NGTD, urine culture NGTD  :: UA showed negative leukocyte esterase and nitrites, 1+ blood, 2+ protein (hx of fibroids)  :: Free thyroxine WNL, TPO antibody normal  :: WBCs increased to 18.0 (patient getting steroids)  - patient currently on day 4 of Levofloxacin 750 daily x 5d  - Continue with steroid course     #Microcytic Anemia  :: Hb 8.8 MCV 69. Was 7.8 previously.  - LYNNE workup, patient receiving oral iron  - Trend CBC      IVF: PRN  Electrolytes: K>4, P>3, Mg>2  Access: PIV    Diet: NPO Diet Except: Sips with meds; Effective midnight   DVT Ppx: DVT risk low, patient continues to ambulate; SCDs  GI Ppx: Not indicated  Bowel regimen: Miralax  Pain management: tylenol 975 mg TID, oxycodone 10mg q6H PRN, dilaudid injection 0.4mg q4H PRN, remainder as above    Extended Emergency Contact Information  Primary Emergency Contact: Adan Hernadez  Home Phone: [phone]  Relation: Spouse  Secondary Emergency Contact: Meg Jorgensen   [facility]  Home Phone: [phone]  Relation: None   Code status: Full Code (confirmed on admission)  PT/OT: [ordered, as able]  Disposition: discharge to home when medically ready    Oscar Vallejo MD  Internal Medicine PGY-II

## 2024-09-23 LAB
ALBUMIN SERPL BCP-MCNC: 2.8 G/DL (ref 3.4–5)
ALP SERPL-CCNC: 52 U/L (ref 33–110)
ALT SERPL W P-5'-P-CCNC: 11 U/L (ref 7–45)
ANION GAP SERPL CALC-SCNC: 9 MMOL/L (ref 10–20)
AST SERPL W P-5'-P-CCNC: 9 U/L (ref 9–39)
BASOPHILS # BLD MANUAL: 0.17 X10*3/UL (ref 0–0.1)
BASOPHILS NFR BLD MANUAL: 0.9 %
BILIRUB DIRECT SERPL-MCNC: 0.1 MG/DL (ref 0–0.3)
BILIRUB SERPL-MCNC: 0.3 MG/DL (ref 0–1.2)
BUN SERPL-MCNC: 16 MG/DL (ref 6–23)
CALCIUM SERPL-MCNC: 8.7 MG/DL (ref 8.6–10.6)
CHLORIDE SERPL-SCNC: 104 MMOL/L (ref 98–107)
CO2 SERPL-SCNC: 30 MMOL/L (ref 21–32)
CREAT SERPL-MCNC: 0.76 MG/DL (ref 0.5–1.05)
EGFRCR SERPLBLD CKD-EPI 2021: >90 ML/MIN/1.73M*2
EOSINOPHIL # BLD MANUAL: 0 X10*3/UL (ref 0–0.7)
EOSINOPHIL NFR BLD MANUAL: 0 %
ERYTHROCYTE [DISTWIDTH] IN BLOOD BY AUTOMATED COUNT: 22.5 % (ref 11.5–14.5)
GLUCOSE SERPL-MCNC: 94 MG/DL (ref 74–99)
HCT VFR BLD AUTO: 27.9 % (ref 36–46)
HGB BLD-MCNC: 7.7 G/DL (ref 12–16)
HYPOCHROMIA BLD QL SMEAR: ABNORMAL
IMM GRANULOCYTES # BLD AUTO: 0.87 X10*3/UL (ref 0–0.7)
IMM GRANULOCYTES NFR BLD AUTO: 4.6 % (ref 0–0.9)
LYMPHOCYTES # BLD MANUAL: 3.59 X10*3/UL (ref 1.2–4.8)
LYMPHOCYTES NFR BLD MANUAL: 19.1 %
MAGNESIUM SERPL-MCNC: 2.1 MG/DL (ref 1.6–2.4)
MCH RBC QN AUTO: 19.8 PG (ref 26–34)
MCHC RBC AUTO-ENTMCNC: 27.6 G/DL (ref 32–36)
MCV RBC AUTO: 72 FL (ref 80–100)
MONOCYTES # BLD MANUAL: 0.49 X10*3/UL (ref 0.1–1)
MONOCYTES NFR BLD MANUAL: 2.6 %
MYELOCYTES # BLD MANUAL: 0.17 X10*3/UL
MYELOCYTES NFR BLD MANUAL: 0.9 %
NEUTS SEG # BLD MANUAL: 14.38 X10*3/UL (ref 1.2–7)
NEUTS SEG NFR BLD MANUAL: 76.5 %
NRBC BLD-RTO: 1.2 /100 WBCS (ref 0–0)
OVALOCYTES BLD QL SMEAR: ABNORMAL
PHOSPHATE SERPL-MCNC: 3.4 MG/DL (ref 2.5–4.9)
PLATELET # BLD AUTO: 791 X10*3/UL (ref 150–450)
POLYCHROMASIA BLD QL SMEAR: ABNORMAL
POTASSIUM SERPL-SCNC: 4 MMOL/L (ref 3.5–5.3)
PROT SERPL-MCNC: 6.7 G/DL (ref 6.4–8.2)
RBC # BLD AUTO: 3.89 X10*6/UL (ref 4–5.2)
RBC MORPH BLD: ABNORMAL
SODIUM SERPL-SCNC: 139 MMOL/L (ref 136–145)
TOTAL CELLS COUNTED BLD: 115
WBC # BLD AUTO: 18.8 X10*3/UL (ref 4.4–11.3)

## 2024-09-23 PROCEDURE — 2500000001 HC RX 250 WO HCPCS SELF ADMINISTERED DRUGS (ALT 637 FOR MEDICARE OP)

## 2024-09-23 PROCEDURE — 85027 COMPLETE CBC AUTOMATED: CPT

## 2024-09-23 PROCEDURE — 99233 SBSQ HOSP IP/OBS HIGH 50: CPT

## 2024-09-23 PROCEDURE — 85007 BL SMEAR W/DIFF WBC COUNT: CPT

## 2024-09-23 PROCEDURE — 36415 COLL VENOUS BLD VENIPUNCTURE: CPT

## 2024-09-23 PROCEDURE — 2500000004 HC RX 250 GENERAL PHARMACY W/ HCPCS (ALT 636 FOR OP/ED)

## 2024-09-23 PROCEDURE — 82374 ASSAY BLOOD CARBON DIOXIDE: CPT

## 2024-09-23 PROCEDURE — 83735 ASSAY OF MAGNESIUM: CPT

## 2024-09-23 PROCEDURE — 84100 ASSAY OF PHOSPHORUS: CPT

## 2024-09-23 PROCEDURE — 82040 ASSAY OF SERUM ALBUMIN: CPT

## 2024-09-23 PROCEDURE — 1100000001 HC PRIVATE ROOM DAILY

## 2024-09-23 ASSESSMENT — COGNITIVE AND FUNCTIONAL STATUS - GENERAL
DAILY ACTIVITIY SCORE: 24
MOBILITY SCORE: 24
DAILY ACTIVITIY SCORE: 24
MOBILITY SCORE: 24

## 2024-09-23 ASSESSMENT — PAIN SCALES - GENERAL
PAINLEVEL_OUTOF10: 7
PAINLEVEL_OUTOF10: 7
PAINLEVEL_OUTOF10: 6
PAINLEVEL_OUTOF10: 7
PAINLEVEL_OUTOF10: 6
PAINLEVEL_OUTOF10: 6
PAINLEVEL_OUTOF10: 0 - NO PAIN
PAINLEVEL_OUTOF10: 7

## 2024-09-23 ASSESSMENT — PAIN DESCRIPTION - ORIENTATION
ORIENTATION: LEFT

## 2024-09-23 ASSESSMENT — PAIN DESCRIPTION - LOCATION
LOCATION: NECK
LOCATION: NECK
LOCATION: SHOULDER

## 2024-09-23 ASSESSMENT — PAIN - FUNCTIONAL ASSESSMENT
PAIN_FUNCTIONAL_ASSESSMENT: 0-10

## 2024-09-23 NOTE — CARE PLAN
The patient's goals for the shift include      The clinical goals for the shift include patient will remain hemodynamically stable throughout shift    Over the shift, the patient did not make progress toward the following goals. Barriers to progression include   Problem: Pain - Adult  Goal: Verbalizes/displays adequate comfort level or baseline comfort level  Outcome: Progressing     Problem: Safety - Adult  Goal: Free from fall injury  Outcome: Progressing     Problem: Discharge Planning  Goal: Discharge to home or other facility with appropriate resources  Outcome: Progressing     Problem: Chronic Conditions and Co-morbidities  Goal: Patient's chronic conditions and co-morbidity symptoms are monitored and maintained or improved  Outcome: Progressing     Problem: Pain  Goal: Takes deep breaths with improved pain control throughout the shift  Outcome: Progressing     Problem: Pain  Goal: Turns in bed with improved pain control throughout the shift  Outcome: Progressing     Problem: Pain  Goal: Walks with improved pain control throughout the shift  Outcome: Progressing     Problem: Pain  Goal: Performs ADL's with improved pain control throughout shift  Outcome: Progressing     Problem: Pain  Goal: Participates in PT with improved pain control throughout the shift  Outcome: Progressing     Problem: Pain  Goal: Free from opioid side effects throughout the shift  Outcome: Progressing     Problem: Pain  Goal: Free from acute confusion related to pain meds throughout the shift  Outcome: Progressing   . Recommendations to address these barriers include .

## 2024-09-23 NOTE — PROGRESS NOTES
"  MEDICINE INPATIENT PROGRESS NOTE      Radha Duncan is a 43 y.o. female on day 7 of admission presenting with Neck mass.    Subjective     No events o/n. Reports pain unchanged from yesterday/weekend. Denies CP, SOB, other airway concerns, or abdominal pain. Tolerating PO.       Objective     Last Recorded Vitals  Blood pressure 90/55, pulse 72, temperature 36.5 °C (97.7 °F), resp. rate 18, height 1.676 m (5' 6\"), weight 109 kg (240 lb), last menstrual period 09/04/2024, SpO2 97%.  Intake/Output last 3 Shifts:  No intake/output data recorded.    Physical Exam  Constitutional:       General: She is not in acute distress.     Appearance: Normal appearance. She is not toxic-appearing.   HENT:      Head: Normocephalic and atraumatic.   Neck:      Comments: L anterior neck mass, tender to touch, warm, solid.  Cardiovascular:      Rate and Rhythm: Normal rate and regular rhythm.      Heart sounds: Normal heart sounds.   Pulmonary:      Effort: Pulmonary effort is normal.      Breath sounds: Normal breath sounds.   Abdominal:      General: Abdomen is flat. There is no distension.      Palpations: Abdomen is soft.      Tenderness: There is no abdominal tenderness.   Musculoskeletal:         General: No swelling.   Skin:     General: Skin is warm and dry.   Neurological:      Mental Status: She is alert.   Psychiatric:         Mood and Affect: Mood normal.         Behavior: Behavior normal.        Relevant Results    Labs    Results from last 7 days   Lab Units 09/22/24  0854 09/21/24  0825 09/20/24  0732   WBC AUTO x10*3/uL 20.4* 26.2* 18.0*   HEMOGLOBIN g/dL 7.7* 8.2* 7.7*   HEMATOCRIT % 27.3* 28.7* 26.2*   PLATELETS AUTO x10*3/uL 690* 676* 469*            Results from last 7 days   Lab Units 09/22/24  0854 09/21/24  0825 09/20/24  0732   SODIUM mmol/L 139 138 136   POTASSIUM mmol/L 4.2 4.0 4.5   CO2 mmol/L 29 26 25   ANION GAP mmol/L 11 15 16   BUN mg/dL 15 11 11   CREATININE mg/dL 0.61 0.64 0.64   GLUCOSE mg/dL " "81 127* 110*   EGFR mL/min/1.73m*2 >90 >90 >90   MAGNESIUM mg/dL 2.07 2.09 2.16   PHOSPHORUS mg/dL 3.5 3.6 2.7      Results from last 7 days   Lab Units 09/22/24  0854 09/21/24  0825 09/20/24  0732   ALT U/L 12 14 11   AST U/L 15 14 14   ALK PHOS U/L 59 70 67   BILIRUBIN DIRECT mg/dL 0.1 0.1 0.1            No lab exists for component: \"APTTT\"         No lab exists for component: \"BNPRESU\", \"CPKT\"  Results from last 7 days   Lab Units 09/20/24  0732 09/18/24  1528 09/18/24  0743   FREE T4 ng/dL 1.31 1.31 1.33        Medications  Scheduled medications  acetaminophen, 975 mg, oral, TID  ferrous sulfate (325 mg ferrous sulfate), 65 mg of iron, oral, Daily  gabapentin, 200 mg, oral, q8h GIULIANA  lactobacillus acidophilus, 1 tablet, oral, BID  lidocaine, 1 patch, transdermal, Daily  naproxen, 250 mg, oral, BID  ondansetron, 4 mg, intravenous, Once  pantoprazole, 40 mg, oral, Daily before breakfast  predniSONE, 40 mg, oral, Daily      Continuous medications     PRN medications  PRN medications: diclofenac sodium, HYDROmorphone, loperamide, oxyCODONE     Imaging  === 08/19/24 ===    US THYROID    - Impression -  1. Left sided TI-RADS category 3 thyroid nodule measuring 8.7 cm and  another smaller TI-RADS category 3 thyroid nodule measuring 3.0 cm.  Both dominant nodules meet the criteria for tissue sampling.    MACRO:  None    Signed by: Swapnil Banegas 8/20/2024 6:06 PM  Dictation workstation:   [id number]  === 09/16/24 ===    CT SOFT TISSUE NECK W IV CONTRAST    - Impression -  Large left thyroid lobe mass measuring 7.0 x 6.3 x 10.5 cm resulting  in significant rightward deviation of the aerodigestive tract as well  as leftward deviation of the carotid space structures. There is mild  narrowing of the trachea at the level of thoracic inlet. Findings are  most consistent with thyroid goiter; however, superimposed malignancy  within the goiter mass is not excluded based on imaging alone.    I personally reviewed the images/study " "and I agree with the findings  as stated. This study was interpreted at [facility], [place], [place].    MACRO:  None    Signed by: Aby Mcgarry 9/16/2024 2:00 PM  Dictation workstation:   [id number]    No results found for the last 90 days.         No lab exists for component: \"AGALPCRNB\" [unfilled]          Assessment/Plan     Radha Duncan is a 43 y.o. female with PMHx of L thyroid mass, fibroids, and anxiety/depression who presents as a transfer from [facility] ED for ENT evaluation for L anterior neck mass. CT neck showed large L thyroid mass with compressive symptoms. TSH 0.67, repeat TSH 0.26 w/T4 1.29 N, TPO <28 WNL, thyroglobulin pending (>3500 on 09/03.). Ddx include subacute thyroiditis due to presenting sx of significant neck/tenderness, low TSH, elevated CRP & ESR, leukocytosis, and mild anemia in setting of 4-5 month hx of goiter. Based on CT scan showing significant swelling of goiter and encroachment on trachea, there is concern for progression to airway compromise. ENT consulted to evaluate best route possible in terms of removal of mass vs biopsy and scope to determine underlying cause. WBCs decreased on day 2 of admission to 11.0, possibly indicating improvement of infection due to antibiotics. However, thyroiditis does not seem to be improving based on patient's continued severe pain. This may indicate thyroiditis is not infectious in nature, but etiology of inflammatory process is unclear.     Updates (09/23/24):  Continue oral prednisone 40mg  Patient's neck pain unchanged compared to yesterday  Patient will be seen by ENT to address pain that is preventing her from leaving the hospital       #Multinodular goiter with Compressive sx  :: Hx of goiter neck mass since May 2024, being evaluated by ENT outpatient; patient's thyroid nodule has grown 44% in volume over past 2 months  :: CT scan shows Large left thyroid lobe mass measuring 7.0 x 6.3 x 10.5 " cm resulting  in significant rightward deviation of the aerodigestive tract and leftward deviation of the carotid space structures, mild narrowing of trachea at level of thoracic inlet; Findings are most consistent with thyroid goiter, but superimposed malignancy  within the goiter mass is not excluded.  ENT continuing to follow, recs continuing IV antibiotics and then beginning short course steroids to reduce inflammation before any type of intervention  Endo continuing to follow - patient's thyroid nodule has grown 44% in volume over past 2 months; multinodular goiter with superimposed thyroiditis  - Maintain pain control: tylenol 975 mg PO TID, oxy 10 mg q4h prn; IV dilaudid 0.4 mg, naproxen 500 mg  - Original plan was to see ENT outpatient, however patient unable to leave hospital 2/2 pain  - gabapentin 200 TID     #Leukocytosis WBC 17.4 on 9/17 (WBC 4.8 on 9/3/24), unclear etiology  #C/f superimposed thyroiditis, unclear type  :: S/p vancomycin in ED; S/p course of cefdinir and flagyl OP x 5 days; Persistent leukocytosis despite abx therapy; WBC normal earlier this month  :: Blood cultures x2 NGTD, urine culture NGTD  :: UA showed negative leukocyte esterase and nitrites, 1+ blood, 2+ protein (hx of fibroids)  :: Free thyroxine WNL, TPO antibody normal  :: WBCs increased to 18.0 (patient getting steroids)  :: Competed IV levofloxacin course 9/21  - Continue with steroid course       #Microcytic Anemia  :: Hb 8.8 MCV 69. Was 7.8 previously.  - LYNNE workup, patient receiving oral iron  - Trend CBC     IVF: PRN  Electrolytes: K>4, P>3, Mg>2  Access: PIV     Diet: NPO Diet Except: Sips with meds; Effective midnight   DVT Ppx: DVT risk low, patient continues to ambulate; SCDs  GI Ppx: Not indicated  Bowel regimen: Miralax  Pain management: tylenol 975 mg TID, oxycodone 10mg q6H PRN, dilaudid injection 0.4mg q4H PRN, remainder as above     Extended Emergency Contact Information  Primary Emergency Contact:  MaricarmenAdan haley  Home Phone: [phone]  Relation: Spouse  Secondary Emergency Contact: Meg Jorgensen   [facility]  Home Phone: [phone]  Relation: None   Code status: Full Code (confirmed on admission)  PT/OT: [ordered, as able]  Disposition: discharge to home when medically ready  Case seen and discussed with attending Dr. Galvan, to addend as necessary.    Michael Reid MD PGY-1

## 2024-09-23 NOTE — PROGRESS NOTES
Physical Therapy                 Therapy Communication Note/ PT Screen     Patient Name: Radha Duncan  MRN: [id number]  Department: [place] [address]  Room: [address] [place], [place] [address]  Today's Date: 9/23/2024     Discipline: Physical Therapy    PT screen: pt reports up (I) in room, (I) transfers, no issues with balance, only limited by neck pain currently.  No PT needs identified at this time.  Educated pt that PT can be reconsulted if mobility deficits arise if hospitalization is extended.  Will discharge current PT order.       09/23/24 at 12:16 PM - Evelin Barillas, PT

## 2024-09-23 NOTE — CARE PLAN
The clinical goals for the shift include patient will not complain of increased neck pain from 7/10 by end of shift 9/23/24 1900    Over the shift, the patient did make progress toward the following goals. Patient remains 6-7/10 pain throughout the shift. Not increasing to above at this time. Managed with PRN and scheduled medications for pain. See emr.

## 2024-09-24 ENCOUNTER — TELEPHONE (OUTPATIENT)
Dept: OTOLARYNGOLOGY | Facility: HOSPITAL | Age: 44
End: 2024-09-24
Payer: COMMERCIAL

## 2024-09-24 ENCOUNTER — APPOINTMENT (OUTPATIENT)
Dept: RADIOLOGY | Facility: HOSPITAL | Age: 44
End: 2024-09-24
Payer: COMMERCIAL

## 2024-09-24 LAB
ALBUMIN SERPL BCP-MCNC: 2.8 G/DL (ref 3.4–5)
ALP SERPL-CCNC: 52 U/L (ref 33–110)
ALT SERPL W P-5'-P-CCNC: 11 U/L (ref 7–45)
ANION GAP SERPL CALC-SCNC: 11 MMOL/L (ref 10–20)
AST SERPL W P-5'-P-CCNC: 11 U/L (ref 9–39)
BASOPHILS # BLD AUTO: ABNORMAL 10*3/UL
BASOPHILS NFR BLD AUTO: ABNORMAL %
BILIRUB DIRECT SERPL-MCNC: 0.7 MG/DL (ref 0–0.3)
BILIRUB SERPL-MCNC: 0.3 MG/DL (ref 0–1.2)
BUN SERPL-MCNC: 18 MG/DL (ref 6–23)
CALCIUM SERPL-MCNC: 8.9 MG/DL (ref 8.6–10.6)
CHLORIDE SERPL-SCNC: 102 MMOL/L (ref 98–107)
CO2 SERPL-SCNC: 29 MMOL/L (ref 21–32)
CREAT SERPL-MCNC: 0.66 MG/DL (ref 0.5–1.05)
EGFRCR SERPLBLD CKD-EPI 2021: >90 ML/MIN/1.73M*2
EOSINOPHIL # BLD AUTO: ABNORMAL 10*3/UL
EOSINOPHIL NFR BLD AUTO: ABNORMAL %
ERYTHROCYTE [DISTWIDTH] IN BLOOD BY AUTOMATED COUNT: 23 % (ref 11.5–14.5)
GLUCOSE SERPL-MCNC: 78 MG/DL (ref 74–99)
HCT VFR BLD AUTO: 27 % (ref 36–46)
HGB BLD-MCNC: 7.7 G/DL (ref 12–16)
HGB RETIC QN: 21 PG (ref 28–38)
IMM GRANULOCYTES # BLD AUTO: 1.07 X10*3/UL (ref 0–0.7)
IMM GRANULOCYTES NFR BLD AUTO: 5.1 % (ref 0–0.9)
IMMATURE RETIC FRACTION: 46.7 %
IRON SATN MFR SERPL: 7 % (ref 25–45)
IRON SERPL-MCNC: 20 UG/DL (ref 35–150)
LYMPHOCYTES # BLD AUTO: ABNORMAL 10*3/UL
LYMPHOCYTES NFR BLD AUTO: ABNORMAL %
MAGNESIUM SERPL-MCNC: 2.02 MG/DL (ref 1.6–2.4)
MCH RBC QN AUTO: 20.4 PG (ref 26–34)
MCHC RBC AUTO-ENTMCNC: 28.5 G/DL (ref 32–36)
MCV RBC AUTO: 71 FL (ref 80–100)
MONOCYTES # BLD AUTO: ABNORMAL 10*3/UL
MONOCYTES NFR BLD AUTO: ABNORMAL %
NEUTROPHILS # BLD AUTO: ABNORMAL 10*3/UL
NEUTROPHILS NFR BLD AUTO: ABNORMAL %
NRBC BLD-RTO: 0.7 /100 WBCS (ref 0–0)
PHOSPHATE SERPL-MCNC: 3.4 MG/DL (ref 2.5–4.9)
PLATELET # BLD AUTO: 999 X10*3/UL (ref 150–450)
POTASSIUM SERPL-SCNC: 3.9 MMOL/L (ref 3.5–5.3)
PROT SERPL-MCNC: 6.5 G/DL (ref 6.4–8.2)
RBC # BLD AUTO: 3.78 X10*6/UL (ref 4–5.2)
RETICS #: 0.07 X10*6/UL (ref 0.02–0.08)
RETICS/RBC NFR AUTO: 2 % (ref 0.5–2)
SODIUM SERPL-SCNC: 138 MMOL/L (ref 136–145)
TIBC SERPL-MCNC: 272 UG/DL (ref 240–445)
UIBC SERPL-MCNC: 252 UG/DL (ref 110–370)
WBC # BLD AUTO: 20.9 X10*3/UL (ref 4.4–11.3)

## 2024-09-24 PROCEDURE — 2500000004 HC RX 250 GENERAL PHARMACY W/ HCPCS (ALT 636 FOR OP/ED)

## 2024-09-24 PROCEDURE — 2550000001 HC RX 255 CONTRASTS: Performed by: RADIOLOGY

## 2024-09-24 PROCEDURE — 88112 CYTOPATH CELL ENHANCE TECH: CPT | Mod: TC,MCY

## 2024-09-24 PROCEDURE — 2720000007 HC OR 272 NO HCPCS

## 2024-09-24 PROCEDURE — 99153 MOD SED SAME PHYS/QHP EA: CPT

## 2024-09-24 PROCEDURE — 99233 SBSQ HOSP IP/OBS HIGH 50: CPT

## 2024-09-24 PROCEDURE — 83735 ASSAY OF MAGNESIUM: CPT

## 2024-09-24 PROCEDURE — 10160 PNXR ASPIR ABSC HMTMA BULLA: CPT

## 2024-09-24 PROCEDURE — 7100000009 HC PHASE TWO TIME - INITIAL BASE CHARGE

## 2024-09-24 PROCEDURE — 83550 IRON BINDING TEST: CPT

## 2024-09-24 PROCEDURE — 76942 ECHO GUIDE FOR BIOPSY: CPT

## 2024-09-24 PROCEDURE — 2500000001 HC RX 250 WO HCPCS SELF ADMINISTERED DRUGS (ALT 637 FOR MEDICARE OP): Performed by: INTERNAL MEDICINE

## 2024-09-24 PROCEDURE — 2500000001 HC RX 250 WO HCPCS SELF ADMINISTERED DRUGS (ALT 637 FOR MEDICARE OP)

## 2024-09-24 PROCEDURE — 82248 BILIRUBIN DIRECT: CPT

## 2024-09-24 PROCEDURE — 88112 CYTOPATH CELL ENHANCE TECH: CPT | Performed by: PATHOLOGY

## 2024-09-24 PROCEDURE — 7100000010 HC PHASE TWO TIME - EACH INCREMENTAL 1 MINUTE

## 2024-09-24 PROCEDURE — 77002 NEEDLE LOCALIZATION BY XRAY: CPT

## 2024-09-24 PROCEDURE — 84100 ASSAY OF PHOSPHORUS: CPT

## 2024-09-24 PROCEDURE — 76978 US TRGT DYN MBUBB 1ST LES: CPT

## 2024-09-24 PROCEDURE — 85045 AUTOMATED RETICULOCYTE COUNT: CPT

## 2024-09-24 PROCEDURE — 88305 TISSUE EXAM BY PATHOLOGIST: CPT | Performed by: PATHOLOGY

## 2024-09-24 PROCEDURE — 36415 COLL VENOUS BLD VENIPUNCTURE: CPT

## 2024-09-24 PROCEDURE — 0G9G3ZX DRAINAGE OF LEFT THYROID GLAND LOBE, PERCUTANEOUS APPROACH, DIAGNOSTIC: ICD-10-PCS | Performed by: RADIOLOGY

## 2024-09-24 PROCEDURE — 87070 CULTURE OTHR SPECIMN AEROBIC: CPT

## 2024-09-24 PROCEDURE — 99152 MOD SED SAME PHYS/QHP 5/>YRS: CPT

## 2024-09-24 PROCEDURE — 0GBG3ZX EXCISION OF LEFT THYROID GLAND LOBE, PERCUTANEOUS APPROACH, DIAGNOSTIC: ICD-10-PCS | Performed by: RADIOLOGY

## 2024-09-24 PROCEDURE — 1100000001 HC PRIVATE ROOM DAILY

## 2024-09-24 PROCEDURE — 2500000004 HC RX 250 GENERAL PHARMACY W/ HCPCS (ALT 636 FOR OP/ED): Performed by: RADIOLOGY

## 2024-09-24 PROCEDURE — 87205 SMEAR GRAM STAIN: CPT

## 2024-09-24 PROCEDURE — 0753T DGTZ GLS MCRSCP SLD LEVEL IV: CPT | Mod: TC,SUR

## 2024-09-24 RX ORDER — FENTANYL CITRATE 50 UG/ML
INJECTION, SOLUTION INTRAMUSCULAR; INTRAVENOUS
Status: COMPLETED | OUTPATIENT
Start: 2024-09-24 | End: 2024-09-24

## 2024-09-24 RX ORDER — MIDAZOLAM HYDROCHLORIDE 1 MG/ML
INJECTION INTRAMUSCULAR; INTRAVENOUS
Status: COMPLETED | OUTPATIENT
Start: 2024-09-24 | End: 2024-09-24

## 2024-09-24 RX ORDER — CYCLOBENZAPRINE HCL 10 MG
5 TABLET ORAL NIGHTLY PRN
Status: DISCONTINUED | OUTPATIENT
Start: 2024-09-24 | End: 2024-09-25 | Stop reason: HOSPADM

## 2024-09-24 RX ORDER — GABAPENTIN 300 MG/1
300 CAPSULE ORAL EVERY 8 HOURS SCHEDULED
Status: DISCONTINUED | OUTPATIENT
Start: 2024-09-24 | End: 2024-09-25 | Stop reason: HOSPADM

## 2024-09-24 RX ORDER — POLYETHYLENE GLYCOL 3350 17 G/17G
17 POWDER, FOR SOLUTION ORAL DAILY
Status: DISCONTINUED | OUTPATIENT
Start: 2024-09-24 | End: 2024-09-25 | Stop reason: HOSPADM

## 2024-09-24 RX ORDER — SENNOSIDES 8.6 MG/1
1 TABLET ORAL NIGHTLY
Status: DISCONTINUED | OUTPATIENT
Start: 2024-09-24 | End: 2024-09-25 | Stop reason: HOSPADM

## 2024-09-24 ASSESSMENT — PAIN SCALES - GENERAL
PAINLEVEL_OUTOF10: 0 - NO PAIN
PAINLEVEL_OUTOF10: 8
PAINLEVEL_OUTOF10: 6
PAINLEVEL_OUTOF10: 8
PAINLEVEL_OUTOF10: 7
PAINLEVEL_OUTOF10: 6
PAINLEVEL_OUTOF10: 6
PAINLEVEL_OUTOF10: 7
PAINLEVEL_OUTOF10: 7
PAINLEVEL_OUTOF10: 8
PAINLEVEL_OUTOF10: 8
PAINLEVEL_OUTOF10: 5 - MODERATE PAIN
PAINLEVEL_OUTOF10: 7
PAINLEVEL_OUTOF10: 5 - MODERATE PAIN

## 2024-09-24 ASSESSMENT — PAIN DESCRIPTION - LOCATION
LOCATION: SHOULDER

## 2024-09-24 ASSESSMENT — COGNITIVE AND FUNCTIONAL STATUS - GENERAL
MOBILITY SCORE: 24
DAILY ACTIVITIY SCORE: 24

## 2024-09-24 ASSESSMENT — PAIN - FUNCTIONAL ASSESSMENT
PAIN_FUNCTIONAL_ASSESSMENT: 0-10

## 2024-09-24 ASSESSMENT — PAIN DESCRIPTION - ORIENTATION
ORIENTATION: LEFT

## 2024-09-24 NOTE — PROGRESS NOTES
"  MEDICINE INPATIENT PROGRESS NOTE      Radha Duncan is a 43 y.o. female on day 8 of admission presenting with Neck mass.    Subjective   No events o/n. Patient reports 7/10 pain. Has not received IV pain meds in last 24hrs. Denies SOB, CP, or abdominal pain.       Objective     Last Recorded Vitals  Blood pressure 119/79, pulse 81, temperature 36.3 °C (97.3 °F), temperature source Temporal, resp. rate 18, height 1.676 m (5' 6\"), weight 109 kg (240 lb), last menstrual period 09/04/2024, SpO2 98%.  Intake/Output last 3 Shifts:  No intake/output data recorded.    Physical Exam   Constitutional:       General: She is not in acute distress.     Appearance: Normal appearance. She is not toxic-appearing.   HENT:      Head: Normocephalic and atraumatic.   Neck:      Comments: L anterior neck mass, tender to touch, warm, solid.  Cardiovascular:      Rate and Rhythm: Normal rate and regular rhythm.      Heart sounds: Normal heart sounds.   Pulmonary:      Effort: Pulmonary effort is normal.      Breath sounds: Normal breath sounds.   Abdominal:      General: Abdomen is flat. There is no distension.      Palpations: Abdomen is soft.      Tenderness: There is no abdominal tenderness.   Musculoskeletal:         General: No swelling.   Skin:     General: Skin is warm and dry.   Neurological:      Mental Status: She is alert.   Psychiatric:         Mood and Affect: Mood normal.         Behavior: Behavior normal.   Relevant Results    Labs    Results from last 7 days   Lab Units 09/24/24  0735 09/23/24  0707 09/22/24  0854   WBC AUTO x10*3/uL 20.9* 18.8* 20.4*   HEMOGLOBIN g/dL 7.7* 7.7* 7.7*   HEMATOCRIT % 27.0* 27.9* 27.3*   PLATELETS AUTO x10*3/uL 999* 791* 690*            Results from last 7 days   Lab Units 09/24/24  0735 09/23/24  0707 09/22/24  0854   SODIUM mmol/L 138 139 139   POTASSIUM mmol/L 3.9 4.0 4.2   CO2 mmol/L 29 30 29   ANION GAP mmol/L 11 9* 11   BUN mg/dL 18 16 15   CREATININE mg/dL 0.66 0.76 0.61 " "  GLUCOSE mg/dL 78 94 81   EGFR mL/min/1.73m*2 >90 >90 >90   MAGNESIUM mg/dL 2.02 2.10 2.07   PHOSPHORUS mg/dL 3.4 3.4 3.5      Results from last 7 days   Lab Units 09/24/24  0735 09/23/24  0707 09/22/24  0854   ALT U/L 11 11 12   AST U/L 11 9 15   ALK PHOS U/L 52 52 59   BILIRUBIN DIRECT mg/dL 0.7* 0.1 0.1            No lab exists for component: \"APTTT\"         No lab exists for component: \"BNPRESU\", \"CPKT\"  Results from last 7 days   Lab Units 09/20/24  0732 09/18/24  1528 09/18/24  0743   FREE T4 ng/dL 1.31 1.31 1.33        Medications  Scheduled medications  acetaminophen, 975 mg, oral, TID  ferrous sulfate (325 mg ferrous sulfate), 65 mg of iron, oral, Daily  gabapentin, 300 mg, oral, q8h GIULIANA  lactobacillus acidophilus, 1 tablet, oral, BID  lidocaine, 1 patch, transdermal, Daily  naproxen, 250 mg, oral, BID  ondansetron, 4 mg, intravenous, Once  pantoprazole, 40 mg, oral, Daily before breakfast  predniSONE, 40 mg, oral, Daily      Continuous medications     PRN medications  PRN medications: cyclobenzaprine, diclofenac sodium, HYDROmorphone, loperamide, oxyCODONE     Imaging  === 08/19/24 ===    US THYROID    - Impression -  1. Left sided TI-RADS category 3 thyroid nodule measuring 8.7 cm and  another smaller TI-RADS category 3 thyroid nodule measuring 3.0 cm.  Both dominant nodules meet the criteria for tissue sampling.    MACRO:  None    Signed by: Swapnil Banegas 8/20/2024 6:06 PM  Dictation workstation:   [id number]  === 09/16/24 ===    CT SOFT TISSUE NECK W IV CONTRAST    - Impression -  Large left thyroid lobe mass measuring 7.0 x 6.3 x 10.5 cm resulting  in significant rightward deviation of the aerodigestive tract as well  as leftward deviation of the carotid space structures. There is mild  narrowing of the trachea at the level of thoracic inlet. Findings are  most consistent with thyroid goiter; however, superimposed malignancy  within the goiter mass is not excluded based on imaging alone.    I " "personally reviewed the images/study and I agree with the findings  as stated. This study was interpreted at [facility], [place], [place].    MACRO:  None    Signed by: Aby Mcgarry 9/16/2024 2:00 PM  Dictation workstation:   [id number]    No results found for the last 90 days.         No lab exists for component: \"AGALPCRNB\" [unfilled]          Assessment/Plan     Radha Duncan is a 43 y.o. female with PMHx of L thyroid mass, fibroids, and anxiety/depression who presents as a transfer from [facility] ED for ENT evaluation for L anterior neck mass. CT neck showed large L thyroid mass with compressive symptoms. TSH 0.67, repeat TSH 0.26 w/T4 1.29 N, TPO <28 WNL, thyroglobulin pending (>3500 on 09/03.). Ddx include subacute thyroiditis due to presenting sx of significant neck/tenderness, low TSH, elevated CRP & ESR, leukocytosis, and mild anemia in setting of 4-5 month hx of goiter. Based on CT scan showing significant swelling of goiter and encroachment on trachea, there is concern for progression to airway compromise. ENT consulted to evaluate best route possible in terms of removal of mass vs biopsy and scope to determine underlying cause. WBCs decreased on day 2 of admission to 11.0, possibly indicating improvement of infection due to antibiotics. However, thyroiditis does not seem to be improving based on patient's continued severe pain. This may indicate thyroiditis is not infectious in nature, but etiology of inflammatory process is unclear.     Updates (09/24/24):  Continue oral prednisone 40mg  Patient's neck pain 7/10 today  Has not received IV pain meds in last 24  Plan for IR guided biopsy today followed by outpatient ENT follow up on 10/22/24        #Multinodular goiter with Compressive sx  :: Hx of goiter neck mass since May 2024, being evaluated by ENT outpatient; patient's thyroid nodule has grown 44% in volume over past 2 months  :: CT scan shows Large " left thyroid lobe mass measuring 7.0 x 6.3 x 10.5 cm resulting  in significant rightward deviation of the aerodigestive tract and leftward deviation of the carotid space structures, mild narrowing of trachea at level of thoracic inlet; Findings are most consistent with thyroid goiter, but superimposed malignancy within the goiter mass is not excluded.  ENT continuing to follow, recs continuing IV antibiotics and then beginning short course steroids to reduce inflammation before any type of intervention  Endo continuing to follow - patient's thyroid nodule has grown 44% in volume over past 2 months; multinodular goiter with superimposed thyroiditis  - Maintain pain control: tylenol 975 mg PO TID, oxy 10 mg q4h prn; IV dilaudid 0.4 mg PRN, naproxen 500 mg  - Original plan was to see ENT outpatient, however patient unable to leave hospital 2/2 pain, now receiving IR guided biopsy of mass  - gabapentin 300 TID     #Leukocytosis WBC 17.4 on 9/17 (WBC 4.8 on 9/3/24), unclear etiology  #C/f superimposed thyroiditis, unclear type  :: S/p vancomycin in ED; S/p course of cefdinir and flagyl OP x 5 days; Persistent leukocytosis despite abx therapy; WBC normal earlier this month  :: Blood cultures x2 NGTD, urine culture NGTD  :: UA showed negative leukocyte esterase and nitrites, 1+ blood, 2+ protein (hx of fibroids)  :: Free thyroxine WNL, TPO antibody normal  :: WBCs increased to 18.0 (patient getting steroids)  :: Competed IV levofloxacin course 9/21  - Continue with steroid course     #Microcytic Anemia  :: Hb 8.8 MCV 69. Was 7.8 previously.  - LYNNE workup, patient receiving oral iron  - Trend CBC     IVF: PRN  Electrolytes: K>4, P>3, Mg>2  Access: PIV     Diet: NPO   DVT Ppx: DVT risk low, patient continues to ambulate; SCDs  GI Ppx: Not indicated  Bowel regimen: Miralax/senna  Pain management: tylenol 975 mg TID, oxycodone 10mg q6H PRN, dilaudid injection 0.4mg q4H PRN, remainder as above     Extended Emergency Contact  Information  Primary Emergency Contact: Adan Hernadez  Home Phone: [phone]  Relation: Spouse  Secondary Emergency Contact: Meg Jorgensen   [facility]  Home Phone: [phone]  Relation: None   Code status: Full Code (confirmed on admission)  PT/OT: [ordered, as able]  Disposition: discharge to home when medically ready  Case seen and discussed with attending Dr. Galvan, to addend as necessary.     Michael Reid MD PGY-1

## 2024-09-24 NOTE — PRE-PROCEDURE NOTE
Interventional Radiology Preprocedure Note    Indication for procedure: The encounter diagnosis was Neck mass.    Relevant review of systems: NA    Relevant Labs:   Lab Results   Component Value Date    CREATININE 0.66 09/24/2024    EGFR >90 09/24/2024       Planned Sedation/Anesthesia: Moderate    Airway assessment: Recent CT showing mass effect upon the trachea. Patient without audible wheeze.     Directed physical examination:    Aox3.  Resting comfortably in bed.  Respiratory rate/effort within normal limits.    Mallampati: III (soft and hard palate and base of uvula visible)    ASA Score: ASA 3 - Patient with moderate systemic disease with functional limitations    Benefits, risks and alternatives of procedure and planned sedation have been discussed with the patient and/or their representative. All questions answered and they agree to proceed.

## 2024-09-24 NOTE — POST-PROCEDURE NOTE
Interventional Radiology Brief Postprocedure Note    Attending: Dr. Tianna Valle    Assistant: Dr. Martin Yarbrough    Diagnosis: Heterogenous L thyroid mass    Description of procedure: L thyroid mass aspiration and core needle biopsy     Anesthesia:  MAC    Complications: None    Estimated Blood Loss: none    Medications  As of 09/24/24 1648      HYDROmorphone (Dilaudid) injection 0.5 mg (mg) Total dose:  1 mg Dosing weight:  109      Date/Time Rate/Dose/Volume Action       09/16/24  2129 0.5 mg Given     09/17/24  0017 0.5 mg Given               HYDROmorphone (Dilaudid) injection 0.4 mg (mg) Total dose:  6.8 mg* Dosing weight:  109   *Administration not included in total     Date/Time Rate/Dose/Volume Action       09/17/24  0300 0.4 mg Given      0746 0.4 mg Given      1304 0.4 mg Given      1752 *0.4 mg Missed      2013 0.4 mg Given     09/18/24  0014 0.4 mg Given      0536 0.4 mg Given      1157 0.4 mg Given     09/19/24  0038 0.4 mg Given      0633 0.4 mg Given      1422 0.4 mg Given      2023 0.4 mg Given     09/20/24  0026 0.4 mg Given      0608 0.4 mg Given      1835 0.4 mg Given     09/21/24  1326 0.4 mg Given     09/22/24  1003 0.4 mg Given      2327 0.4 mg Given               vancomycin (Vancocin) 1,000 mg in dextrose 5%  mL (mL/hr) Total dose:  1,000 mg* Dosing weight:  109   *From user-documented volume     Date/Time Rate/Dose/Volume Action       09/16/24  2324 1,000 mg - 200 mL/hr (over 60 min) New Bag      2325 200 mL       2325  (over 60 min) Stopped     09/17/24  0041 1,000 mg - 200 mL/hr (over 60 min) New Bag      0141  (over 60 min) Stopped               polyethylene glycol (Glycolax, Miralax) packet 17 g (g) Total dose:  68 g* Dosing weight:  109   *Administration not included in total     Date/Time Rate/Dose/Volume Action       09/17/24  0806 17 g Given     09/18/24  1538 17 g Given     09/19/24  0843 17 g Given     09/20/24  1010 *17 g Missed     09/21/24  0928 17 g Given     09/24/24  1107 *17  g Missed               oxyCODONE (Roxicodone) immediate release tablet 5 mg (mg) Total dose:  20 mg Dosing weight:  109      Date/Time Rate/Dose/Volume Action       09/17/24  0521 5 mg Given      1633 5 mg Given      2235 5 mg Given     09/18/24  0431 5 mg Given               potassium chloride CR (Klor-Con M20) ER tablet 40 mEq (mEq) Total dose:  120 mEq Dosing weight:  109      Date/Time Rate/Dose/Volume Action       09/17/24  0300 40 mEq Given     09/18/24  1023 40 mEq Given     09/19/24  1059 40 mEq Given               levoFLOXacin (Levaquin) 750 mg in dextrose 5%  mL (mL/hr) Total dose:  3,750 mg* Dosing weight:  109   *From user-documented volume     Date/Time Rate/Dose/Volume Action       09/17/24  0307 750 mg - 100 mL/hr (over 90 min) New Bag      0437 150 mL Stopped     09/18/24  0204 750 mg - 100 mL/hr (over 90 min) - 150 mL New Bag      0339  (over 90 min) Stopped      0856 0 mL      09/19/24  0214 750 mg - 100 mL/hr (over 90 min) - 150 mL New Bag      0418  (over 90 min) Stopped     09/20/24  0116 750 mg - 100 mL/hr (over 90 min) New Bag      0353  (over 90 min) Stopped     09/21/24  0210 750 mg - 100 mL/hr (over 90 min) New Bag      0354 300 mL Stopped               ferrous sulfate (325 mg ferrous sulfate) tablet 325 mg (mg) Total dose:  520 mg of iron Dosing weight:  109      Date/Time Rate/Dose/Volume Action       09/17/24  0806 325 mg Given     09/18/24  0845 325 mg Given     09/19/24  0842 325 mg Given     09/20/24  0813 325 mg Given     09/21/24  0924 325 mg Given     09/22/24  0852 325 mg Given     09/23/24  0854 325 mg Given     09/24/24  0812 325 mg Given               acetaminophen (Tylenol) tablet 975 mg (mg) Total dose:  21,450 mg* Dosing weight:  109   *Administration not included in total     Date/Time Rate/Dose/Volume Action       09/17/24  0514 975 mg Given      0806 975 mg Given      1432 975 mg Given      2050 975 mg Given     09/18/24  0313 975 mg Given      1537 975 mg Given       1938 975 mg Given     09/19/24  0842 975 mg Given      1714 *975 mg Missed      2023 975 mg Given     09/20/24  0810 975 mg Given      1524 *975 mg Incomplete      2023 975 mg Given     09/21/24 0924 975 mg Given      1512 975 mg Given      2146 975 mg Given     09/22/24  0852 975 mg Given      1426 975 mg Given      2058 975 mg Given     09/23/24  0853 975 mg Given      1556 975 mg Given      2103 975 mg Given     09/24/24  0812 975 mg Given      1505 975 mg Given               lactated Ringer's bolus 1,000 mL (mL/hr) Total volume:  1,000 mL* Dosing weight:  109   *From user-documented volume     Date/Time Rate/Dose/Volume Action       09/17/24  2353 1,000 mL - 166.7 mL/hr (over 360 min) New Bag     09/18/24  0600 996 mL       0845  (over 360 min) Stopped      0856 4 mL                oxyCODONE (Roxicodone) immediate release tablet 10 mg (mg) Total dose:  250 mg Dosing weight:  109      Date/Time Rate/Dose/Volume Action       09/18/24  1023 10 mg Given      1537 10 mg Given      2228 10 mg Given     09/19/24  0444 10 mg Given      1059 10 mg Given      1718 10 mg Given      2232 10 mg Given     09/20/24  0236 10 mg Given      1138 10 mg Given      2023 10 mg Given     09/21/24  0213 10 mg Given      0631 10 mg Given      1230 10 mg Given      1731 10 mg Given      2146 10 mg Given     09/22/24  0627 10 mg Given      1625 10 mg Given      2057 10 mg Given     09/23/24  0646 10 mg Given      1316 10 mg Given      1758 10 mg Given      2104 10 mg Given     09/24/24  0402 10 mg Given      0816 10 mg Given      1249 10 mg Given               magnesium sulfate 2 g in sterile water for injection 50 mL (g) Total dose:  0 g* Dosing weight:  109   *Administration not included in total     Date/Time Rate/Dose/Volume Action       09/18/24  0957 *2 g - 25 mL/hr (over 120 min) Missed               magnesium oxide (Mag-Ox) tablet 400 mg (mg) Total dose:  1,600 mg Dosing weight:  109      Date/Time Rate/Dose/Volume Action        09/18/24  1023 400 mg Given     09/19/24  0842 400 mg Given     09/20/24  0812 400 mg Given     09/21/24  0924 400 mg Given               ketorolac (Toradol) injection 30 mg (mg) Total dose:  120 mg Dosing weight:  109      Date/Time Rate/Dose/Volume Action       09/18/24  1339 30 mg Given      1938 30 mg Given     09/19/24  0219 30 mg Given      0858 30 mg Given               ondansetron (Zofran) injection 4 mg (mg) Total dose:  0 mg* Dosing weight:  109   *Administration not included in total     Date/Time Rate/Dose/Volume Action       09/18/24  1214 *4 mg Missed               naproxen (Naprosyn) tablet 500 mg (mg) Total dose:  0 mg* Dosing weight:  109   *Administration not included in total     Date/Time Rate/Dose/Volume Action       09/18/24  2129 *500 mg Missed     09/19/24  1419 *500 mg Missed               predniSONE (Deltasone) tablet 40 mg (mg) Total dose:  160 mg* Dosing weight:  109   *Administration not included in total     Date/Time Rate/Dose/Volume Action       09/19/24  1420 *40 mg Missed     09/21/24  1230 40 mg Given     09/22/24  0851 40 mg Given     09/23/24  0853 40 mg Given     09/24/24  0812 40 mg Given               dexAMETHasone (Decadron) injection 4 mg (mg) Total dose:  24 mg Dosing weight:  109      Date/Time Rate/Dose/Volume Action       09/19/24  1421 4 mg Given      2023 4 mg Given     09/20/24  0349 4 mg Given      1137 4 mg Given      2023 4 mg Given     09/21/24  0404 4 mg Given               loperamide (Imodium) capsule 2 mg (mg) Total dose:  Cannot be calculated* Dosing weight:  109   *Administration dose not documented     Date/Time Rate/Dose/Volume Action       09/21/24  Canceled Entry               lactobacillus acidophilus tablet 1 tablet (tablet) Total dose:  8 tablet Dosing weight:  109      Date/Time Rate/Dose/Volume Action       09/20/24 2023 1 tablet Given     09/21/24  0936 1 tablet Given      2146 1 tablet Given     09/22/24  0851 1 tablet Given      2058 1 tablet  Given     09/23/24  0854 1 tablet Given      2103 1 tablet Given     09/24/24  0812 1 tablet Given               pantoprazole (ProtoNix) EC tablet 40 mg (mg) Total dose:  160 mg Dosing weight:  109      Date/Time Rate/Dose/Volume Action       09/21/24  1230 40 mg Given     09/22/24  0626 40 mg Given     09/23/24  0644 40 mg Given     09/24/24  0531 40 mg Given               gabapentin (Neurontin) capsule 100 mg (mg) Total dose:  300 mg Dosing weight:  109      Date/Time Rate/Dose/Volume Action       09/21/24  1511 100 mg Given      2146 100 mg Given     09/22/24  0626 100 mg Given               gabapentin (Neurontin) capsule 200 mg (mg) Total dose:  1,200 mg Dosing weight:  109      Date/Time Rate/Dose/Volume Action       09/22/24  1426 200 mg Given      2100 200 mg Given     09/23/24  0644 200 mg Given      1316 200 mg Given      2103 200 mg Given     09/24/24  0530 200 mg Given               naproxen (Naprosyn) tablet 250 mg (mg) Total dose:  1,500 mg Dosing weight:  109      Date/Time Rate/Dose/Volume Action       09/21/24  1511 250 mg Given     09/22/24  0852 250 mg Given      1721 250 mg Given     09/23/24  0854 250 mg Given      1753 250 mg Given     09/24/24  0812 250 mg Given               cyclobenzaprine (Flexeril) tablet 5 mg (mg) Total dose:  10 mg* Dosing weight:  109   *Administration not included in total     Date/Time Rate/Dose/Volume Action       09/21/24  1727 5 mg Given      2146 5 mg Given     09/22/24  0842 *5 mg Missed               diclofenac sodium (Voltaren) 1 % gel 4 g (g) Total dose:  4 g Dosing weight:  109      Date/Time Rate/Dose/Volume Action       09/22/24  2057 4 g Given               lidocaine 4 % patch 1 patch (patch) Total dose:  1 patch* Dosing weight:  109   *Administration not included in total     Date/Time Rate/Dose/Volume Action       09/22/24  1208 1 patch (over 720 min) Medication Applied      2329  (over 720 min) Medication Removed     09/23/24  0901 *1 patch (over 720  min) Missed     09/24/24  0800 *1 patch (over 720 min) Missed               gabapentin (Neurontin) capsule 300 mg (mg) Total dose:  300 mg Dosing weight:  109      Date/Time Rate/Dose/Volume Action       09/24/24  1505 300 mg Given               iron dextran complex (Infed) 1,000 mg in sodium chloride 0.9% 500 mL IV (g) Total dose:  0 g* Dosing weight:  109   *Administration not included in total     Date/Time Rate/Dose/Volume Action       09/24/24  1450 *1,000 mg - 260 mL/hr (over 120 min) Missed               fentaNYL PF (Sublimaze) injection (mcg) Total dose:  50 mcg      Date/Time Rate/Dose/Volume Action       09/24/24  1620 50 mcg Given               midazolam (Versed) injection (mg) Total dose:  1 mg      Date/Time Rate/Dose/Volume Action       09/24/24  1620 1 mg Given                   Purulent appearing aspirate and core samples sent to pathology,     See detailed result report with images in PACS.    The patient tolerated the procedure well without incident or complication and is in stable condition.

## 2024-09-24 NOTE — CARE PLAN
The clinical goals for the shift include patient will have pain less then 7/10 throughout the shift 9/24/24 1900    Over the shift, the patient did not make progress toward the following goals. Barriers to progression include s/p procedure of needle aspiration on left neck. Patient states 8/10 sharp/ shooting pain. Given medication as ordered, see EMR.

## 2024-09-24 NOTE — TELEPHONE ENCOUNTER
Spoke with the patient    Also spoke with managing physician team    Scan was reviewed with the patient last week as well as with endocrinology and plan was for outpatient management     given continued symptoms and normalization of the TSH inpatient biopsy will be obtained to evaluate for neoplastic or other process    CT does have punctate deposits and some indistinct posterior borders and with her pain biopsy is warranted    This was discussed with the patient    Further management will be based on the results    Ultimately looking at surgical removal as an outpatient

## 2024-09-25 VITALS
WEIGHT: 240 LBS | RESPIRATION RATE: 16 BRPM | HEART RATE: 72 BPM | SYSTOLIC BLOOD PRESSURE: 130 MMHG | TEMPERATURE: 97.5 F | HEIGHT: 66 IN | OXYGEN SATURATION: 98 % | BODY MASS INDEX: 38.57 KG/M2 | DIASTOLIC BLOOD PRESSURE: 85 MMHG

## 2024-09-25 LAB
ALBUMIN SERPL BCP-MCNC: 2.7 G/DL (ref 3.4–5)
ALP SERPL-CCNC: 49 U/L (ref 33–110)
ALT SERPL W P-5'-P-CCNC: 9 U/L (ref 7–45)
ANION GAP SERPL CALC-SCNC: 10 MMOL/L (ref 10–20)
AST SERPL W P-5'-P-CCNC: 7 U/L (ref 9–39)
BASOPHILS # BLD MANUAL: 0 X10*3/UL (ref 0–0.1)
BASOPHILS NFR BLD MANUAL: 0 %
BILIRUB DIRECT SERPL-MCNC: 0 MG/DL (ref 0–0.3)
BILIRUB SERPL-MCNC: 0.3 MG/DL (ref 0–1.2)
BUN SERPL-MCNC: 14 MG/DL (ref 6–23)
BURR CELLS BLD QL SMEAR: ABNORMAL
CALCIUM SERPL-MCNC: 9.1 MG/DL (ref 8.6–10.6)
CHLORIDE SERPL-SCNC: 100 MMOL/L (ref 98–107)
CO2 SERPL-SCNC: 32 MMOL/L (ref 21–32)
CREAT SERPL-MCNC: 0.75 MG/DL (ref 0.5–1.05)
EGFRCR SERPLBLD CKD-EPI 2021: >90 ML/MIN/1.73M*2
EOSINOPHIL # BLD MANUAL: 0.12 X10*3/UL (ref 0–0.7)
EOSINOPHIL NFR BLD MANUAL: 0.8 %
ERYTHROCYTE [DISTWIDTH] IN BLOOD BY AUTOMATED COUNT: 23.4 % (ref 11.5–14.5)
GLUCOSE SERPL-MCNC: 88 MG/DL (ref 74–99)
HCT VFR BLD AUTO: 27.4 % (ref 36–46)
HGB BLD-MCNC: 7.7 G/DL (ref 12–16)
HYPOCHROMIA BLD QL SMEAR: ABNORMAL
IMM GRANULOCYTES # BLD AUTO: 0.61 X10*3/UL (ref 0–0.7)
IMM GRANULOCYTES NFR BLD AUTO: 4.2 % (ref 0–0.9)
LYMPHOCYTES # BLD MANUAL: 3.65 X10*3/UL (ref 1.2–4.8)
LYMPHOCYTES NFR BLD MANUAL: 24.8 %
MAGNESIUM SERPL-MCNC: 2.04 MG/DL (ref 1.6–2.4)
MCH RBC QN AUTO: 20.4 PG (ref 26–34)
MCHC RBC AUTO-ENTMCNC: 28.1 G/DL (ref 32–36)
MCV RBC AUTO: 73 FL (ref 80–100)
METAMYELOCYTES # BLD MANUAL: 0.13 X10*3/UL
METAMYELOCYTES NFR BLD MANUAL: 0.9 %
MONOCYTES # BLD MANUAL: 0.63 X10*3/UL (ref 0.1–1)
MONOCYTES NFR BLD MANUAL: 4.3 %
MYELOCYTES # BLD MANUAL: 0.12 X10*3/UL
MYELOCYTES NFR BLD MANUAL: 0.8 %
NEUTS SEG # BLD MANUAL: 10.05 X10*3/UL (ref 1.2–7)
NEUTS SEG NFR BLD MANUAL: 68.4 %
NRBC BLD-RTO: 0.7 /100 WBCS (ref 0–0)
OVALOCYTES BLD QL SMEAR: ABNORMAL
PHOSPHATE SERPL-MCNC: 3.9 MG/DL (ref 2.5–4.9)
PLATELET # BLD AUTO: 1117 X10*3/UL (ref 150–450)
POTASSIUM SERPL-SCNC: 5 MMOL/L (ref 3.5–5.3)
PROT SERPL-MCNC: 6 G/DL (ref 6.4–8.2)
RBC # BLD AUTO: 3.77 X10*6/UL (ref 4–5.2)
RBC MORPH BLD: ABNORMAL
SODIUM SERPL-SCNC: 137 MMOL/L (ref 136–145)
TARGETS BLD QL SMEAR: ABNORMAL
TOTAL CELLS COUNTED BLD: 117
WBC # BLD AUTO: 14.7 X10*3/UL (ref 4.4–11.3)

## 2024-09-25 PROCEDURE — 2500000004 HC RX 250 GENERAL PHARMACY W/ HCPCS (ALT 636 FOR OP/ED)

## 2024-09-25 PROCEDURE — 2500000001 HC RX 250 WO HCPCS SELF ADMINISTERED DRUGS (ALT 637 FOR MEDICARE OP)

## 2024-09-25 PROCEDURE — 84100 ASSAY OF PHOSPHORUS: CPT

## 2024-09-25 PROCEDURE — 99239 HOSP IP/OBS DSCHRG MGMT >30: CPT | Performed by: INTERNAL MEDICINE

## 2024-09-25 PROCEDURE — 85027 COMPLETE CBC AUTOMATED: CPT

## 2024-09-25 PROCEDURE — 2500000001 HC RX 250 WO HCPCS SELF ADMINISTERED DRUGS (ALT 637 FOR MEDICARE OP): Performed by: INTERNAL MEDICINE

## 2024-09-25 PROCEDURE — 36415 COLL VENOUS BLD VENIPUNCTURE: CPT

## 2024-09-25 PROCEDURE — 80053 COMPREHEN METABOLIC PANEL: CPT

## 2024-09-25 PROCEDURE — 83735 ASSAY OF MAGNESIUM: CPT

## 2024-09-25 PROCEDURE — 85007 BL SMEAR W/DIFF WBC COUNT: CPT

## 2024-09-25 PROCEDURE — 85060 BLOOD SMEAR INTERPRETATION: CPT | Performed by: PATHOLOGY

## 2024-09-25 RX ORDER — OXYCODONE HYDROCHLORIDE 5 MG/1
5 TABLET ORAL EVERY 6 HOURS PRN
Qty: 15 TABLET | Refills: 0 | Status: SHIPPED | OUTPATIENT
Start: 2024-09-25 | End: 2024-09-26 | Stop reason: SDUPTHER

## 2024-09-25 RX ORDER — LIDOCAINE 560 MG/1
1 PATCH PERCUTANEOUS; TOPICAL; TRANSDERMAL DAILY
Qty: 30 PATCH | Refills: 0 | Status: SHIPPED | OUTPATIENT
Start: 2024-09-26 | End: 2024-10-26

## 2024-09-25 RX ORDER — CYCLOBENZAPRINE HCL 5 MG
5 TABLET ORAL NIGHTLY PRN
Qty: 30 TABLET | Refills: 0 | Status: SHIPPED | OUTPATIENT
Start: 2024-09-25 | End: 2024-10-25

## 2024-09-25 RX ORDER — L. ACIDOPHILUS/L.BULGARICUS 1MM CELL
1 TABLET ORAL 2 TIMES DAILY
Qty: 60 TABLET | Refills: 1 | Status: SHIPPED | OUTPATIENT
Start: 2024-09-25 | End: 2024-11-24

## 2024-09-25 RX ORDER — FERROUS SULFATE 325(65) MG
65 TABLET ORAL
Qty: 90 TABLET | Refills: 2 | Status: SHIPPED | OUTPATIENT
Start: 2024-09-25

## 2024-09-25 RX ORDER — ACETAMINOPHEN 325 MG/1
975 TABLET ORAL 3 TIMES DAILY
Qty: 270 TABLET | Refills: 1 | Status: SHIPPED | OUTPATIENT
Start: 2024-09-25 | End: 2024-11-24

## 2024-09-25 RX ORDER — DICLOFENAC SODIUM 10 MG/G
4 GEL TOPICAL 4 TIMES DAILY PRN
Qty: 30 G | Refills: 0 | Status: SHIPPED | OUTPATIENT
Start: 2024-09-25 | End: 2024-10-25

## 2024-09-25 RX ORDER — GABAPENTIN 300 MG/1
300 CAPSULE ORAL EVERY 8 HOURS SCHEDULED
Qty: 90 CAPSULE | Refills: 0 | Status: SHIPPED | OUTPATIENT
Start: 2024-09-25 | End: 2024-10-25

## 2024-09-25 RX ORDER — NAPROXEN 250 MG/1
250 TABLET ORAL
Qty: 60 TABLET | Refills: 0 | Status: SHIPPED | OUTPATIENT
Start: 2024-09-25 | End: 2024-10-25

## 2024-09-25 ASSESSMENT — PAIN DESCRIPTION - LOCATION: LOCATION: NECK

## 2024-09-25 ASSESSMENT — PAIN SCALES - GENERAL
PAINLEVEL_OUTOF10: 7
PAINLEVEL_OUTOF10: 8
PAINLEVEL_OUTOF10: 3
PAINLEVEL_OUTOF10: 7

## 2024-09-25 NOTE — NURSING NOTE
Reviewed discharge paperwork with patient, follow up appointments and medication schedule. IV removed, pt safely left unit with transport to await ride .

## 2024-09-25 NOTE — DISCHARGE SUMMARY
Discharge Diagnosis  Neck mass    Issues Requiring Follow-Up  Follow up with ENT on discharge     Discharge Meds     Medication List      START taking these medications     acetaminophen 325 mg tablet; Commonly known as: Tylenol; Take 3 tablets   (975 mg) by mouth 3 times a day.   cyclobenzaprine 5 mg tablet; Commonly known as: Flexeril; Take 1 tablet   (5 mg) by mouth as needed at bedtime for muscle spasms (Trapezius   tightness/pain).   diclofenac sodium 1 % gel; Commonly known as: Voltaren; Apply 4.5 inches   (4 g) topically 4 times a day as needed (neck pain).   gabapentin 300 mg capsule; Commonly known as: Neurontin; Take 1 capsule   (300 mg) by mouth every 8 hours.   lactobacillus acidophilus tablet tablet; Take 1 tablet by mouth 2 times   a day.   lidocaine 4 % patch; Place 1 patch over 12 hours on the skin once daily.   Remove & discard patch within 12 hours or as directed by MD.; Start taking   on: September 26, 2024   naproxen 250 mg tablet; Commonly known as: Naprosyn; Take 1 tablet (250   mg) by mouth 2 times daily (morning and late afternoon).   oxyCODONE 5 mg immediate release tablet; Commonly known as: Roxicodone;   Take 1 tablet (5 mg) by mouth every 6 hours if needed for moderate pain (4   - 6) for up to 5 days.     CONTINUE taking these medications     ferrous sulfate 325 (65 Fe) MG EC tablet   FIBER ORAL   fluticasone 50 mcg/actuation nasal spray; Commonly known as: Flonase   multivitamin tablet   VITAMIN D3 ORAL     STOP taking these medications     cefdinir 300 mg capsule; Commonly known as: Omnicef   metroNIDAZOLE 500 mg tablet; Commonly known as: Flagyl       Test Results Pending At Discharge  Pending Labs       Order Current Status    Non-gynecologic cytology In process    Pathologist Review-CBC Differential In process    Surgical Pathology Exam In process    Thyroid stimulating immunoglobulin In process    Thyroid stimulating immunoglobulin In process    Sterile Fluid Culture/Smear  Preliminary result            Hospital Course  Patient presented in [facility] ED as transfer from [facility] ED after presenting for severe neck stiffness and pain, thyroid goiter swelling, voice hoarseness, and headache. Patient presented in ED with hypertension, tachycardia, afebrile. Patient was given IV dilaudid 0.5, toradol 15 mg, percocet 5 mg, morphine 4 mg for pain control. CBC showed leukocytosis, Hgb of 9.9, MCV 69. TSH initially 0.67 in ED, normal free T4, TPO WNL. Patient was given IV vancomycin due to concern for infection due to leukocytosis. Blood cultures x2 drawn. CT neck w/IV showed large left thyroid lobe mas measuring 7.0 x 6.3 x 10.5 cm resulting in significant rightward deviation of aerodigestive tract and leftward deviation of carotid space structures, mild narrowing of trachea. Repeat TSH was low. Patient was switched to levofloxacin 750 daily for 5 days. Patient started receiving oral iron. ENT consulted for recs - evaluated patient, determined not at risk for airway compromise and patient should get biopsy done outpatient. Patient's pain regimen was increased on day 2 of admission due to complaints of severe pain. Labs on 9/18 showed decreased WBCs to 11.0. Endo consulted to help determine etiology of mixed clinical picture, recs addition of naproxen and repeat labs which showed normal TSH, free T4, free T3 and very high thyroglobulin. Pain regimen was increased on day 3 of admission due to continued pain and neck tenderness. IV dexamethasone 4 mg was started on day 3 of admission. Patient began to have few episodes of watery diarrhea that night. Patient's neck pain and swelling improved on day 4 of admission.    Patients pain/swelling worsened over the weekend back to level prior to steroids. Ultimately, was decided pain prevented discharge and outpatient Ent follow up. On 9/23/24, was decided to move forward with IR guided biopsy of neck mass. Patient did well overnight without needing Iv pain  medication.    The following day the patient's neck mass was biopsied via IR and patient had no complications. Progressed through the night without getting IV pain medication. On 9/25 patient was stable and ready for discharge. Patient sent home with 5 days of 5mg oxy q6h PRN for pain control and told to contact PCP or ENT if needing further pain control. Steroids were stopped at discharge. ENT in agreement with stopping steroids. ROS negative at time of dc     Pertinent Physical Exam At Time of Discharge  Physical Exam    Alert and oriented x3  Swelling noted along her neck, no change in comparison to yesterday  Chest good air entry bilaterally  Cardiac normal s1 and s2  Abdomen soft nondistended positive bowl sounds  No LE edema   Outpatient Follow-Up  Future Appointments   Date Time Provider Department Center   10/22/2024  2:15 PM Aris Gan MD [id number] [facility]   11/20/2024 11:15 AM Emilee Fairbanks MD [id number] [facility]   12/12/2024  1:30 PM Eleni Mark PA-C [id number] [facility]     Time spent on evaluating and discharging this patient is 45 minutes    Mihir Stephens MD

## 2024-09-25 NOTE — CARE PLAN
The patient's goals for the shift include      The clinical goals for the shift include patient will rate pain less than 7/10 by end of shift    Over the shift, the patient did not make progress toward the following goals. Barriers to progression include . Recommendations to address these barriers include   Problem: Pain - Adult  Goal: Verbalizes/displays adequate comfort level or baseline comfort level  Outcome: Progressing     Problem: Safety - Adult  Goal: Free from fall injury  Outcome: Progressing     Problem: Discharge Planning  Goal: Discharge to home or other facility with appropriate resources  Outcome: Progressing     Problem: Chronic Conditions and Co-morbidities  Goal: Patient's chronic conditions and co-morbidity symptoms are monitored and maintained or improved  Outcome: Progressing     Problem: Pain  Goal: Takes deep breaths with improved pain control throughout the shift  Outcome: Progressing     Problem: Pain  Goal: Turns in bed with improved pain control throughout the shift  Outcome: Progressing     Problem: Pain  Goal: Walks with improved pain control throughout the shift  Outcome: Progressing     Problem: Pain  Goal: Performs ADL's with improved pain control throughout shift  Outcome: Progressing     Problem: Pain  Goal: Participates in PT with improved pain control throughout the shift  Outcome: Progressing     Problem: Pain  Goal: Free from opioid side effects throughout the shift  Outcome: Progressing     Problem: Pain  Goal: Free from acute confusion related to pain meds throughout the shift  Outcome: Progressing   .

## 2024-09-25 NOTE — DISCHARGE INSTRUCTIONS
Dear Mrs. Duncan,    You were treated in the hospital for a large neck/thyroid mass. You received pain medications, steroids, and a biopsy of said mass on 9/24/24. Results of this biopsy will be available on [employer] in likely 3-5 business day from the date of biopsy. You are being sent home with days of oxycodone 5mg that can be taken every 6 hours as needed. If needed you can take two capsules for breakthrough pain. For further pain management medications please contact your primary care provider or ENT provider. You were also given cyclobenzaprine (muscle relaxer) once nightly and gabapentin 300mg with one tablet being taken every 8 hours as needed. The lidocaine patch can be applied once every 12 hours over your neck. Please take three tablets of the tylenol, three times a day as needed. You may take the probiotic supplement twice a day. Please follow up with ENT on your October 22nd appointment.     It was a pleasure taking care of you in the hospital.   Patient informed/Family informed/Explanation of wait/Warm blanket/Pillow/TV

## 2024-09-26 ENCOUNTER — OFFICE VISIT (OUTPATIENT)
Dept: PRIMARY CARE | Facility: CLINIC | Age: 44
End: 2024-09-26
Payer: COMMERCIAL

## 2024-09-26 ENCOUNTER — TELEPHONE (OUTPATIENT)
Dept: OTOLARYNGOLOGY | Facility: HOSPITAL | Age: 44
End: 2024-09-26

## 2024-09-26 VITALS
OXYGEN SATURATION: 98 % | TEMPERATURE: 97.4 F | BODY MASS INDEX: 39.38 KG/M2 | DIASTOLIC BLOOD PRESSURE: 66 MMHG | HEART RATE: 71 BPM | SYSTOLIC BLOOD PRESSURE: 106 MMHG | WEIGHT: 244 LBS

## 2024-09-26 DIAGNOSIS — E07.9 DISORDER OF THYROID: ICD-10-CM

## 2024-09-26 DIAGNOSIS — R22.1 NECK MASS: ICD-10-CM

## 2024-09-26 LAB
LABORATORY COMMENT REPORT: NORMAL
PATH REPORT.FINAL DX SPEC: NORMAL
PATH REPORT.GROSS SPEC: NORMAL
PATH REPORT.RELEVANT HX SPEC: NORMAL
PATH REPORT.TOTAL CANCER: NORMAL
PATH REVIEW-CBC DIFFERENTIAL: NORMAL

## 2024-09-26 PROCEDURE — 99213 OFFICE O/P EST LOW 20 MIN: CPT | Performed by: FAMILY MEDICINE

## 2024-09-26 RX ORDER — METHYLPREDNISOLONE 4 MG/1
TABLET ORAL
Qty: 21 TABLET | Refills: 0 | Status: SHIPPED | OUTPATIENT
Start: 2024-09-26 | End: 2024-10-03

## 2024-09-26 RX ORDER — OXYCODONE HYDROCHLORIDE 5 MG/1
5 TABLET ORAL
Qty: 70 TABLET | Refills: 0 | Status: SHIPPED | OUTPATIENT
Start: 2024-09-26 | End: 2024-10-10

## 2024-09-26 NOTE — TELEPHONE ENCOUNTER
Spoke with patient    She has been discharged    Surgical path from her biopsy yesterday shows acute inflammation hemorrhage and necrosis consistent with the acute thyroiditis      Naina can you please call in a Medrol Dosepak      Will see patient in the office on the 22nd but Krissy can you please look for a surgical date for left substernal thyroidectomy after that      Kate this is just follow-up given that you initially sent me the chat about this

## 2024-09-26 NOTE — PROGRESS NOTES
Subjective   Patient ID: Radha Duncan is a 43 y.o. female who presents for Hospital Follow-up (F/U from [facility] from 9/16-9/25. Re: Thyroiditis causing severe L sided neck pain ).  HPI patient presents for follow-up after she was admitted to the hospital with left-sided neck swelling ultimately diagnosed to be thyroiditis from an inflamed thyroid gland.  She is status post CAT scan and ultrasound guided biopsy.  She is awaiting for the biopsy results.  She has been having a lot of pain.  Difficult for her to turn her head.  She is taking Tylenol and oxycodone.  Also taking naproxen.  She took it regularly she was in the hospital.  Still waiting to hear about what the plan is to remove this lesion.    Patient Active Problem List   Diagnosis    Thyroid nodule    Nontoxic multinodular goiter    Neck mass       Social Connections: Not on file       Current Outpatient Medications on File Prior to Visit   Medication Sig Dispense Refill    acetaminophen (Tylenol) 325 mg tablet Take 3 tablets (975 mg) by mouth 3 times a day. 270 tablet 1    cyclobenzaprine (Flexeril) 5 mg tablet Take 1 tablet (5 mg) by mouth as needed at bedtime for muscle spasms (Trapezius tightness/pain). 30 tablet 0    diclofenac sodium (Voltaren) 1 % gel Apply 4.5 inches (4 g) topically 4 times a day as needed (neck pain). 30 g 0    ferrous sulfate 325 (65 Fe) MG EC tablet Take 1 tablet by mouth every other day.      ferrous sulfate, 325 mg ferrous sulfate, tablet Take 1 tablet (325 mg) by mouth once daily with breakfast. 90 tablet 2    fluticasone (Flonase) 50 mcg/actuation nasal spray Administer 1 spray into each nostril once daily. Shake gently. Before first use, prime pump. After use, clean tip and replace cap.      gabapentin (Neurontin) 300 mg capsule Take 1 capsule (300 mg) by mouth every 8 hours. 90 capsule 0    lactobacillus acidophilus tablet tablet Take 1 tablet by mouth 2 times a day. 60 tablet 1    lidocaine 4 % patch Place 1 patch  over 12 hours on the skin once daily. Remove & discard patch within 12 hours or as directed by MD. 30 patch 0    multivitamin tablet Take 1 tablet by mouth once daily.      naproxen (Naprosyn) 250 mg tablet Take 1 tablet (250 mg) by mouth 2 times daily (morning and late afternoon). 60 tablet 0    oxyCODONE (Roxicodone) 5 mg immediate release tablet Take 1 tablet (5 mg) by mouth every 6 hours if needed for moderate pain (4 - 6) for up to 5 days. 15 tablet 0    psyllium seed, with sugar, (FIBER ORAL) Take 1 Dose by mouth once daily as needed.      cholecalciferol, vitamin D3, (VITAMIN D3 ORAL) Take 1 tablet by mouth once daily.      [DISCONTINUED] cefdinir (Omnicef) 300 mg capsule Take 1 capsule (300 mg) by mouth twice a day.      [DISCONTINUED] metroNIDAZOLE (Flagyl) 500 mg tablet Take 1 tablet (500 mg) by mouth 3 times a day.       Current Facility-Administered Medications on File Prior to Visit   Medication Dose Route Frequency Provider Last Rate Last Admin    [DISCONTINUED] acetaminophen (Tylenol) tablet 975 mg  975 mg oral TID Stanton Landrum MD   975 mg at 09/25/24 1452    [DISCONTINUED] cyclobenzaprine (Flexeril) tablet 5 mg  5 mg oral Nightly PRN Michael Reid MD   5 mg at 09/24/24 2131    [DISCONTINUED] diclofenac sodium (Voltaren) 1 % gel 4 g  4 g Topical 4x daily PRN Oscar Vallejo MD   4 g at 09/22/24 2057    [DISCONTINUED] ferrous sulfate (325 mg ferrous sulfate) tablet 325 mg  65 mg of iron oral Daily Stanton Landrum MD   325 mg at 09/25/24 0838    [DISCONTINUED] gabapentin (Neurontin) capsule 300 mg  300 mg oral q8h GIULIANA Stephens MD   300 mg at 09/25/24 1452    [DISCONTINUED] iron sucrose (Venofer) injection 200 mg  200 mg intravenous q72h Michael Reid MD   200 mg at 09/24/24 2134    [DISCONTINUED] lactobacillus acidophilus tablet 1 tablet  1 tablet oral BID Oscar Fuller MD   1 tablet at 09/25/24 0845    [DISCONTINUED] lidocaine 4 % patch 1 patch  1 patch transdermal Daily Oscar  SHEILA Vallejo MD   1 patch at 09/22/24 1208    [DISCONTINUED] loperamide (Imodium) capsule 2 mg  2 mg oral 4x daily PRN Marie Sheets MD        [DISCONTINUED] naproxen (Naprosyn) tablet 250 mg  250 mg oral BID Oscar Vallejo MD   250 mg at 09/25/24 0838    [DISCONTINUED] ondansetron (Zofran) injection 4 mg  4 mg intravenous Once Oscar Fuller MD        [DISCONTINUED] oxyCODONE (Roxicodone) immediate release tablet 10 mg  10 mg oral q4h PRN Oscar Fuller MD   10 mg at 09/25/24 1452    [DISCONTINUED] pantoprazole (ProtoNix) EC tablet 40 mg  40 mg oral Daily before breakfast Oscar Vallejo MD   40 mg at 09/25/24 0619    [DISCONTINUED] polyethylene glycol (Glycolax, Miralax) packet 17 g  17 g oral Daily Michael Reid MD   17 g at 09/25/24 0839    [DISCONTINUED] predniSONE (Deltasone) tablet 40 mg  40 mg oral Daily Oscar Vallejo MD   40 mg at 09/25/24 0845    [DISCONTINUED] sennosides (Senokot) tablet 8.6 mg  1 tablet oral Nightly Michael Reid MD   8.6 mg at 09/24/24 2125        Vitals:    09/26/24 1224   BP: 106/66   Pulse: 71   Temp: 36.3 °C (97.4 °F)   SpO2: 98%     Vitals:    09/26/24 1224   Weight: 111 kg (244 lb)       Review of Systems   All other systems reviewed and are negative.      Objective     Physical Exam  Constitutional:       Appearance: Normal appearance. She is well-developed.   HENT:      Head: Atraumatic.   Cardiovascular:      Rate and Rhythm: Normal rate and regular rhythm.      Heart sounds: Normal heart sounds. No murmur heard.  Pulmonary:      Effort: Pulmonary effort is normal.      Breath sounds: Normal breath sounds.   Abdominal:      General: Bowel sounds are normal.      Palpations: Abdomen is soft.   Musculoskeletal:      Cervical back: Tenderness present.   Skin:     General: Skin is warm.   Neurological:      General: No focal deficit present.      Mental Status: She is alert.   Psychiatric:         Mood and Affect: Mood normal.         No results  displayed because visit has over 200 results.      Admission on 09/16/2024, Discharged on 09/16/2024   Component Date Value Ref Range Status    WBC 09/16/2024 17.4 (H)  4.4 - 11.3 x10*3/uL Final    nRBC 09/16/2024 0.1 (H)  0.0 - 0.0 /100 WBCs Final    RBC 09/16/2024 4.37  4.00 - 5.20 x10*6/uL Final    Hemoglobin 09/16/2024 8.8 (L)  12.0 - 16.0 g/dL Final    Hematocrit 09/16/2024 30.3 (L)  36.0 - 46.0 % Final    MCV 09/16/2024 69 (L)  80 - 100 fL Final    MCH 09/16/2024 20.1 (L)  26.0 - 34.0 pg Final    MCHC 09/16/2024 29.0 (L)  32.0 - 36.0 g/dL Final    RDW 09/16/2024 21.6 (H)  11.5 - 14.5 % Final    Platelets 09/16/2024 411  150 - 450 x10*3/uL Final    Neutrophils % 09/16/2024 79.4  40.0 - 80.0 % Final    Immature Granulocytes %, Automated 09/16/2024 2.0 (H)  0.0 - 0.9 % Final    Immature Granulocyte Count (IG) includes promyelocytes, myelocytes and metamyelocytes but does not include bands. Percent differential counts (%) should be interpreted in the context of the absolute cell counts (cells/UL).    Lymphocytes % 09/16/2024 9.6  13.0 - 44.0 % Final    Monocytes % 09/16/2024 8.6  2.0 - 10.0 % Final    Eosinophils % 09/16/2024 0.1  0.0 - 6.0 % Final    Basophils % 09/16/2024 0.3  0.0 - 2.0 % Final    Neutrophils Absolute 09/16/2024 13.79 (H)  1.20 - 7.70 x10*3/uL Final    Percent differential counts (%) should be interpreted in the context of the absolute cell counts (cells/uL).    Immature Granulocytes Absolute, Au* 09/16/2024 0.35  0.00 - 0.70 x10*3/uL Final    Lymphocytes Absolute 09/16/2024 1.67  1.20 - 4.80 x10*3/uL Final    Monocytes Absolute 09/16/2024 1.49 (H)  0.10 - 1.00 x10*3/uL Final    Eosinophils Absolute 09/16/2024 0.01  0.00 - 0.70 x10*3/uL Final    Basophils Absolute 09/16/2024 0.06  0.00 - 0.10 x10*3/uL Final    Glucose 09/16/2024 96  74 - 99 mg/dL Final    Sodium 09/16/2024 136  136 - 145 mmol/L Final    Potassium 09/16/2024 3.5  3.5 - 5.3 mmol/L Final    MILD HEMOLYSIS DETECTED. The result may  be falsely elevated due to hemolysis or other interferents. Clinical correlation is recommended. Repeat testing may be considered.    Chloride 09/16/2024 101  98 - 107 mmol/L Final    Bicarbonate 09/16/2024 24  21 - 32 mmol/L Final    Anion Gap 09/16/2024 15  10 - 20 mmol/L Final    Urea Nitrogen 09/16/2024 6  6 - 23 mg/dL Final    Creatinine 09/16/2024 0.58  0.50 - 1.05 mg/dL Final    eGFR 09/16/2024 >90  >60 mL/min/1.73m*2 Final    Calculations of estimated GFR are performed using the 2021 CKD-EPI Study Refit equation without the race variable for the IDMS-Traceable creatinine methods.  https://jasn.asnjournals.org/content/early/2021/09/22/ASN.6480680211    Calcium 09/16/2024 8.7  8.6 - 10.3 mg/dL Final    Albumin 09/16/2024 3.6  3.4 - 5.0 g/dL Final    Bilirubin, Total 09/16/2024 0.6  0.0 - 1.2 mg/dL Final    Bilirubin, Direct 09/16/2024 0.2  0.0 - 0.3 mg/dL Final    MILD HEMOLYSIS DETECTED. The result may be falsely decreased due to hemolysis or other interferents. Clinical correlation is recommended. Repeat testing may be considered.    Alkaline Phosphatase 09/16/2024 74  33 - 110 U/L Final    ALT 09/16/2024 16  7 - 45 U/L Final    Patients treated with Sulfasalazine may generate falsely decreased results for ALT.    AST 09/16/2024 25  9 - 39 U/L Final    MILD HEMOLYSIS DETECTED. The result may be falsely elevated due to hemolysis or other interferents. Clinical correlation is recommended. Repeat testing may be considered.    Total Protein 09/16/2024 7.8  6.4 - 8.2 g/dL Final    Thyroid Stimulating Hormone 09/16/2024 0.67  0.44 - 3.98 mIU/L Final    Sedimentation Rate 09/16/2024 >130 (H)  0 - 20 mm/h Final    C-Reactive Protein 09/16/2024 36.83 (H)  <1.00 mg/dL Final    Thyroglobulin 09/16/2024 82696.1 (H)  1.3 - 31.8 ng/mL Final    INTERPRETIVE INFORMATION: Thyroglobulin, Serum or Plasma    Specimens negative for thyroglobulin antibodies (TgAb) are tested   for thyroglobulin (Tg) by chemiluminescent  immunoassay (BRADY) using   the [employer] Access DxI method. Specimens with TgAb results   above the upper reference limit are tested for Tg by   high-performance liquid chromatography-tandem mass spectrometry   (LC-MS/MS). Results obtained with different test methods or kits   cannot be used interchangeably. Tg results, regardless of   concentration, should not be interpreted as absolute evidence for   the presence or absence of papillary or follicular thyroid cancer.   Tg testing is not recommended for use as a screening procedure to   detect the presence of thyroid cancer in the general population.    Thyroglobulin LC-MS/MS 09/16/2024 Not Applicable  1.3 - 31.8 ng/mL Final    INTERPRETIVE INFORMATION: Thyroglobulin by LC-MS/MS, Serum/Plasma    Lower limit of detection for Thyroglobulin by LC-MS/MS is 0.5   ng/mL.    This test was developed and its performance characteristics   determined by [facility]. It has not been cleared or   approved by the US Food and Drug Administration. This test was   performed in a CLIA certified laboratory and is intended for   clinical purposes.  Performed By: [place] [address]  : Cliff Delgado MD, PhD  CLIA Number: [id number]    Anti-Thyroglobulin AB 09/16/2024 <0.9  0.0 - 4.0 IU/mL Final    INTERPRETIVE INFORMATION: Thyroglobulin Antibody                                    A value of 4.0 IU/mL or less indicates a negative result for   thyroglobulin antibodies.    The Thyroglobulin Antibody assay is being performed using the   [employer] Access DxI method.    RBC Morphology 09/16/2024 See Below   Final    Polychromasia 09/16/2024 Mild   Final    Hypochromia 09/16/2024 Mild   Final    RBC Fragments 09/16/2024 Few   Final    Target Cells 09/16/2024 Few   Final    Ovalocytes 09/16/2024 Few   Final    Bill Only Thyroglobulin 09/16/2024 Billed   Final    Performed By: [place] [address]  : Cliff Delgado MD, PhD  CLIA Number: [id number]   Office Visit on 09/11/2024   Component Date Value Ref Range Status    POC VAMSI-COV-2 AG 09/11/2024 Presumptive negative test for SARS-CoV-2 (no antigen detected)  Presumptive negative test for SARS-CoV-2 (no antigen detected) Final   Documentation on 09/05/2024   Component Date Value Ref Range Status    Hemoglobin A1C External 09/03/2024 5.3  % Final   Lab on 09/03/2024   Component Date Value Ref Range Status    Vitamin D, 25-Hydroxy, Total 09/03/2024 30  30 - 100 ng/mL Final    Thyroid Stimulating Hormone 09/03/2024 0.64  0.44 - 3.98 mIU/L Final    Hemoglobin A1C 09/03/2024 5.3  see below % Final    Estimated Average Glucose 09/03/2024 105  Not Established mg/dL Final    Cholesterol 09/03/2024 185  0 - 199 mg/dL Final          Age      Desirable   Borderline High   High     0-19 Y     0 - 169       170 - 199     >/= 200    20-24 Y     0 - 189       190 - 224     >/= 225         >24 Y     0 - 199       200 - 239     >/= 240   **All ranges are based on fasting samples. Specific   therapeutic targets will vary based on patient-specific   cardiac risk.    Pediatric guidelines reference:Pediatrics 2011, 128(S5).Adult guidelines reference: NCEP ATPIII Guidelines,MARY 2001, 258:2486-97    Venipuncture immediately after or during the administration of Metamizole may lead to falsely low results. Testing should be performed immediately prior to Metamizole dosing.    HDL-Cholesterol 09/03/2024 65.5  mg/dL Final      Age       Very Low   Low     Normal    High    0-19 Y    < 35      < 40     40-45     ----  20-24 Y    ----     < 40      >45      ----        >24 Y      ----     < 40     40-60      >60      Cholesterol/HDL Ratio 09/03/2024 2.8   Final      Ref Values  Desirable  < 3.4  High Risk  > 5.0    LDL Calculated 09/03/2024 109 (H)  <=99 mg/dL Final                                Near   Borderline      AGE       Desirable  Optimal    High     High     Very High     0-19 Y     0 - 109     ---    110-129   >/= 130     ----    20-24 Y     0 - 119     ---    120-159   >/= 160     ----      >24 Y     0 -  99   100-129  130-159   160-189     >/=190      VLDL 09/03/2024 11  0 - 40 mg/dL Final    Triglycerides 09/03/2024 54  0 - 149 mg/dL Final       Age         Desirable   Borderline High   High     Very High   0 D-90 D    19 - 174         ----         ----        ----  91 D- 9 Y     0 -  74        75 -  99     >/= 100      ----    10-19 Y     0 -  89        90 - 129     >/= 130      ----    20-24 Y     0 - 114       115 - 149     >/= 150      ----         >24 Y     0 - 149       150 - 199    200- 499    >/= 500    Venipuncture immediately after or during the administration of Metamizole may lead to falsely low results. Testing should be performed immediately prior to Metamizole dosing.    Non HDL Cholesterol 09/03/2024 120  0 - 149 mg/dL Final          Age       Desirable   Borderline High   High     Very High     0-19 Y     0 - 119       120 - 144     >/= 145    >/= 160    20-24 Y     0 - 149       150 - 189     >/= 190      ----         >24 Y    30 mg/dL above LDL Cholesterol goal      Vitamin B12 09/03/2024 745  211 - 911 pg/mL Final    Glucose 09/03/2024 85  74 - 99 mg/dL Final    Sodium 09/03/2024 140  136 - 145 mmol/L Final    Potassium 09/03/2024 4.5  3.5 - 5.3 mmol/L Final    Chloride 09/03/2024 107  98 - 107 mmol/L Final    Bicarbonate 09/03/2024 27  21 - 32 mmol/L Final    Anion Gap 09/03/2024 11  10 - 20 mmol/L Final    Urea Nitrogen 09/03/2024 12  6 - 23 mg/dL Final    Creatinine 09/03/2024 0.94  0.50 - 1.05 mg/dL Final    eGFR 09/03/2024 78  >60 mL/min/1.73m*2 Final    Calculations of estimated GFR are performed using the 2021 CKD-EPI Study Refit equation without the race variable for the IDMS-Traceable creatinine methods.  https://jasn.asnjournals.org/content/early/2021/09/22/ASN.3571876405    Calcium 09/03/2024 9.8   8.6 - 10.6 mg/dL Final    Albumin 09/03/2024 4.1  3.4 - 5.0 g/dL Final    Alkaline Phosphatase 09/03/2024 38  33 - 110 U/L Final    Total Protein 09/03/2024 7.4  6.4 - 8.2 g/dL Final    AST 09/03/2024 15  9 - 39 U/L Final    Bilirubin, Total 09/03/2024 0.5  0.0 - 1.2 mg/dL Final    ALT 09/03/2024 9  7 - 45 U/L Final    Patients treated with Sulfasalazine may generate falsely decreased results for ALT.    Thyroid Peroxidase (TPO) Antibody 09/03/2024 <28  <=60 IU/mL Final    Thyroglobulin 09/03/2024 3744.4 (H)  1.3 - 31.8 ng/mL Final    INTERPRETIVE INFORMATION: Thyroglobulin, Serum or Plasma    Specimens negative for thyroglobulin antibodies (TgAb) are tested   for thyroglobulin (Tg) by chemiluminescent immunoassay (BRADY) using   the [employer] Access DxI method. Specimens with TgAb results   above the upper reference limit are tested for Tg by   high-performance liquid chromatography-tandem mass spectrometry   (LC-MS/MS). Results obtained with different test methods or kits   cannot be used interchangeably. Tg results, regardless of   concentration, should not be interpreted as absolute evidence for   the presence or absence of papillary or follicular thyroid cancer.   Tg testing is not recommended for use as a screening procedure to   detect the presence of thyroid cancer in the general population.    Thyroglobulin LC-MS/MS 09/03/2024 Not Applicable  1.3 - 31.8 ng/mL Final    INTERPRETIVE INFORMATION: Thyroglobulin by LC-MS/MS, Serum/Plasma    Lower limit of detection for Thyroglobulin by LC-MS/MS is 0.5   ng/mL.    This test was developed and its performance characteristics   determined by [facility]. It has not been cleared or   approved by the US Food and Drug Administration. This test was   performed in a CLIA certified laboratory and is intended for   clinical purposes.  Performed By: [facility]  [address] [place] [id number]  : Cliff Delgado MD,    [facility] Number: [id number]    Anti-Thyroglobulin AB 09/03/2024 <0.9  0.0 - 4.0 IU/mL Final    INTERPRETIVE INFORMATION: Thyroglobulin Antibody                                    A value of 4.0 IU/mL or less indicates a negative result for   thyroglobulin antibodies.    The Thyroglobulin Antibody assay is being performed using the   [employer] Access DxI method.    WBC 09/03/2024 4.8  4.4 - 11.3 x10*3/uL Final    nRBC 09/03/2024 0.0  0.0 - 0.0 /100 WBCs Final    RBC 09/03/2024 4.10  4.00 - 5.20 x10*6/uL Final    Hemoglobin 09/03/2024 7.8 (L)  12.0 - 16.0 g/dL Final    Hematocrit 09/03/2024 28.7 (L)  36.0 - 46.0 % Final    MCV 09/03/2024 70 (L)  80 - 100 fL Final    MCH 09/03/2024 19.0 (L)  26.0 - 34.0 pg Final    MCHC 09/03/2024 27.2 (L)  32.0 - 36.0 g/dL Final    RDW 09/03/2024 19.6 (H)  11.5 - 14.5 % Final    Platelets 09/03/2024 367  150 - 450 x10*3/uL Final    Neutrophils % 09/03/2024 39.5  40.0 - 80.0 % Final    Immature Granulocytes %, Automated 09/03/2024 0.2  0.0 - 0.9 % Final    Immature Granulocyte Count (IG) includes promyelocytes, myelocytes and metamyelocytes but does not include bands. Percent differential counts (%) should be interpreted in the context of the absolute cell counts (cells/UL).    Lymphocytes % 09/03/2024 44.9  13.0 - 44.0 % Final    Monocytes % 09/03/2024 9.4  2.0 - 10.0 % Final    Eosinophils % 09/03/2024 4.8  0.0 - 6.0 % Final    Basophils % 09/03/2024 1.2  0.0 - 2.0 % Final    Neutrophils Absolute 09/03/2024 1.90  1.20 - 7.70 x10*3/uL Final    Percent differential counts (%) should be interpreted in the context of the absolute cell counts (cells/uL).    Immature Granulocytes Absolute, Au* 09/03/2024 0.01  0.00 - 0.70 x10*3/uL Final    Lymphocytes Absolute 09/03/2024 2.16  1.20 - 4.80 x10*3/uL Final    Monocytes Absolute 09/03/2024 0.45  0.10 - 1.00 x10*3/uL Final    Eosinophils Absolute 09/03/2024 0.23  0.00 - 0.70 x10*3/uL Final    Basophils Absolute 09/03/2024 0.06  0.00 -  0.10 x10*3/uL Final    Iron 09/03/2024 24 (L)  35 - 150 ug/dL Final    UIBC 09/03/2024 >450 (H)  110 - 370 ug/dL Final    TIBC 09/03/2024    Final    One or more of the analytes used in this calculation is outside of the analytical measurement range.      % Saturation 09/03/2024    Final    One or more analytes used in this calculation is outside of the analytical measurement range. Calculation cannot be performed.    Bill Only Thyroglobulin 09/03/2024 Billed   Final    Performed By: [facility]  [address] [place] [address]  : Cliff Delgado MD, PhD  CLIA Number: [id number]       Assessment/Plan   Problem List Items Addressed This Visit             ICD-10-CM    Neck mass R22.1    Relevant Medications    oxyCODONE (Roxicodone) 5 mg immediate release tablet     Continue follow-up with ENT.  Wrote for oxycodones for pain for now.

## 2024-09-26 NOTE — DISCHARGE SUMMARY
Discharge Diagnosis  Neck mass    Issues Requiring Follow-Up  Follow up with ENT, appointment on 10/22/24  For further pain control needs contact ENT or PCP    Discharge Meds     Medication List      START taking these medications     acetaminophen 325 mg tablet; Commonly known as: Tylenol; Take 3 tablets   (975 mg) by mouth 3 times a day.   cyclobenzaprine 5 mg tablet; Commonly known as: Flexeril; Take 1 tablet   (5 mg) by mouth as needed at bedtime for muscle spasms (Trapezius   tightness/pain).   diclofenac sodium 1 % gel; Commonly known as: Voltaren; Apply 4.5 inches   (4 g) topically 4 times a day as needed (neck pain).   gabapentin 300 mg capsule; Commonly known as: Neurontin; Take 1 capsule   (300 mg) by mouth every 8 hours.   lactobacillus acidophilus tablet tablet; Take 1 tablet by mouth 2 times   a day.   lidocaine 4 % patch; Place 1 patch over 12 hours on the skin once daily.   Remove & discard patch within 12 hours or as directed by MD.   naproxen 250 mg tablet; Commonly known as: Naprosyn; Take 1 tablet (250   mg) by mouth 2 times daily (morning and late afternoon).   oxyCODONE 5 mg immediate release tablet; Commonly known as: Roxicodone;   Take 1 tablet (5 mg) by mouth every 6 hours if needed for moderate pain (4   - 6) for up to 5 days.     CHANGE how you take these medications     * ferrous sulfate 325 (65 Fe) MG EC tablet; What changed: Another   medication with the same name was added. Make sure you understand how and   when to take each.   * ferrous sulfate (325 mg ferrous sulfate) tablet; Take 1 tablet (325   mg) by mouth once daily with breakfast.; What changed: You were already   taking a medication with the same name, and this prescription was added.   Make sure you understand how and when to take each.  * This list has 2 medication(s) that are the same as other medications   prescribed for you. Read the directions carefully, and ask your doctor or   other care provider to review them with  you.     CONTINUE taking these medications     FIBER ORAL   fluticasone 50 mcg/actuation nasal spray; Commonly known as: Flonase   multivitamin tablet   VITAMIN D3 ORAL     STOP taking these medications     cefdinir 300 mg capsule; Commonly known as: Omnicef   metroNIDAZOLE 500 mg tablet; Commonly known as: Flagyl       Test Results Pending At Discharge  Pending Labs       Order Current Status    Non-gynecologic cytology In process    Pathologist Review-CBC Differential In process    Thyroid stimulating immunoglobulin In process    Thyroid stimulating immunoglobulin In process    Sterile Fluid Culture/Smear Preliminary result            Hospital Course  Patient presented in [facility] ED as transfer from [facility] ED after presenting for severe neck stiffness and pain, thyroid goiter swelling, voice hoarseness, and headache. Patient presented in ED with hypertension, tachycardia, afebrile. Patient was given IV dilaudid 0.5, toradol 15 mg, percocet 5 mg, morphine 4 mg for pain control. CBC showed leukocytosis, Hgb of 9.9, MCV 69. TSH initially 0.67 in ED, normal free T4, TPO WNL. Patient was given IV vancomycin due to concern for infection due to leukocytosis. Blood cultures x2 drawn. CT neck w/IV showed large left thyroid lobe mas measuring 7.0 x 6.3 x 10.5 cm resulting in significant rightward deviation of aerodigestive tract and leftward deviation of carotid space structures, mild narrowing of trachea. Repeat TSH was low. Patient was switched to levofloxacin 750 daily for 5 days. Patient started receiving oral iron. ENT consulted for recs - evaluated patient, determined not at risk for airway compromise and patient should get biopsy done outpatient. Patient's pain regimen was increased on day 2 of admission due to complaints of severe pain. Labs on 9/18 showed decreased WBCs to 11.0. Endo consulted to help determine etiology of mixed clinical picture, recs addition of naproxen and repeat labs which showed normal TSH,  free T4, free T3 and very high thyroglobulin. Pain regimen was increased on day 3 of admission due to continued pain and neck tenderness. IV dexamethasone 4 mg was started on day 3 of admission. Patient began to have few episodes of watery diarrhea that night. Patient's neck pain and swelling improved on day 4 of admission.    Patients pain/swelling worsened over the weekend back to level prior to steroids. Ultimately, was decided pain prevented discharge and outpatient Ent follow up. On 9/23/24, was decided to move forward with IR guided biopsy of neck mass. Patient did well overnight without needing Iv pain medication.    The following day the patient's neck mass was biopsied via IR and patient had no complications. Progressed through the night without getting IV pain medication. On 9/25 patient was stable and ready for discharge. Patient sent home with 5 days of 5mg oxy q6h PRN for pain control and told to contact PCP or ENT if needing further pain control. Steroids were stopped at discharge.    Pertinent Physical Exam At Time of Discharge  Physical Exam   Constitutional:       General: She is not in acute distress.     Appearance: Normal appearance. She is not toxic-appearing.   HENT:      Head: Normocephalic and atraumatic.   Neck:      Comments: L anterior neck mass, tender to touch, warm, solid.  Cardiovascular:      Rate and Rhythm: Normal rate and regular rhythm.      Heart sounds: Normal heart sounds.   Pulmonary:      Effort: Pulmonary effort is normal.      Breath sounds: Normal breath sounds.   Abdominal:      General: Abdomen is flat. There is no distension.      Palpations: Abdomen is soft.      Tenderness: There is no abdominal tenderness.   Musculoskeletal:         General: No swelling.   Skin:     General: Skin is warm and dry.   Neurological:      Mental Status: She is alert.   Psychiatric:         Mood and Affect: Mood normal.         Behavior: Behavior normal.     Outpatient Follow-Up  Future  Appointments   Date Time Provider Department [place]   9/26/2024 12:30 PM Tylor Singh MD [id number] [facility]   10/22/2024  2:15 PM Aris Gan MD [id number] [facility]   11/20/2024 11:15 AM Emilee Fairbanks MD [id number] [facility]   12/12/2024  1:30 PM Eleni Mark PA-C [id number] [facility]         Michael Reid MD

## 2024-09-27 LAB
BACTERIA FLD CULT: NORMAL
GRAM STN SPEC: NORMAL
GRAM STN SPEC: NORMAL

## 2024-09-30 LAB
LABORATORY COMMENT REPORT: NORMAL
LABORATORY COMMENT REPORT: NORMAL
PATH REPORT.FINAL DX SPEC: NORMAL
PATH REPORT.GROSS SPEC: NORMAL
PATH REPORT.RELEVANT HX SPEC: NORMAL
PATH REPORT.TOTAL CANCER: NORMAL

## 2024-10-03 LAB — TSI SER-ACNC: <1 TSI INDEX

## 2024-10-07 ENCOUNTER — APPOINTMENT (OUTPATIENT)
Dept: OTOLARYNGOLOGY | Facility: CLINIC | Age: 44
End: 2024-10-07
Payer: COMMERCIAL

## 2024-10-07 VITALS — BODY MASS INDEX: 39.1 KG/M2 | WEIGHT: 249.1 LBS | HEIGHT: 67 IN | TEMPERATURE: 97.5 F

## 2024-10-07 DIAGNOSIS — R22.1 NECK MASS: ICD-10-CM

## 2024-10-07 DIAGNOSIS — E04.9 GOITER: ICD-10-CM

## 2024-10-07 DIAGNOSIS — R07.0 THROAT DISCOMFORT: Primary | ICD-10-CM

## 2024-10-07 LAB — TSI SER-ACNC: <1 TSI INDEX

## 2024-10-07 PROCEDURE — 31575 DIAGNOSTIC LARYNGOSCOPY: CPT | Performed by: OTOLARYNGOLOGY

## 2024-10-07 PROCEDURE — 99214 OFFICE O/P EST MOD 30 MIN: CPT | Performed by: OTOLARYNGOLOGY

## 2024-10-07 PROCEDURE — 3008F BODY MASS INDEX DOCD: CPT | Performed by: OTOLARYNGOLOGY

## 2024-10-07 RX ORDER — PREDNISONE 10 MG/1
TABLET ORAL
Qty: 30 TABLET | Refills: 0 | Status: SHIPPED | OUTPATIENT
Start: 2024-10-07 | End: 2024-10-17

## 2024-10-07 RX ORDER — TRAMADOL HYDROCHLORIDE 50 MG/1
100 TABLET ORAL EVERY 6 HOURS PRN
Qty: 56 TABLET | Refills: 0 | Status: SHIPPED | OUTPATIENT
Start: 2024-10-07 | End: 2024-10-14

## 2024-10-07 RX ORDER — TRAMADOL HYDROCHLORIDE 50 MG/1
100 TABLET ORAL EVERY 6 HOURS PRN
Qty: 56 TABLET | Refills: 0 | Status: CANCELLED | OUTPATIENT
Start: 2024-10-07 | End: 2024-10-14

## 2024-10-07 ASSESSMENT — PATIENT HEALTH QUESTIONNAIRE - PHQ9
1. LITTLE INTEREST OR PLEASURE IN DOING THINGS: NOT AT ALL
SUM OF ALL RESPONSES TO PHQ9 QUESTIONS 1 AND 2: 0
2. FEELING DOWN, DEPRESSED OR HOPELESS: NOT AT ALL

## 2024-10-08 ENCOUNTER — PATIENT MESSAGE (OUTPATIENT)
Dept: OTOLARYNGOLOGY | Facility: CLINIC | Age: 44
End: 2024-10-08
Payer: COMMERCIAL

## 2024-10-08 DIAGNOSIS — E04.2 NONTOXIC MULTINODULAR GOITER: ICD-10-CM

## 2024-10-08 PROBLEM — E04.9 GOITER: Status: ACTIVE | Noted: 2024-10-08

## 2024-10-08 PROBLEM — R07.0 THROAT DISCOMFORT: Status: ACTIVE | Noted: 2024-10-08

## 2024-10-08 NOTE — DOCUMENTATION CLARIFICATION NOTE
PATIENT:               WHITLEY GUERRA  ACCT #:                  [id number]  MRN:                       [id number]  :                       1980  ADMIT DATE:       2024 8:02 PM  DISCH DATE:        2024 3:26 PM  RESPONDING PROVIDER #:        [id number]          PROVIDER RESPONSE TEXT:    Neck mass related to subacute thyroiditis    CDI QUERY TEXT:    Clarification    Instruction:    Based on your assessment of the patient and the clinical information, please provide the requested documentation by clicking on the appropriate radio button and enter any additional information if prompted.    Question: Please clarify most likely etiology of L neck mass    When answering this query, please exercise your independent professional judgment. The fact that a question is being asked, does not imply that any particular answer is desired or expected.    The patient's clinical indicators include:  Clinical Information: 43 YOF presenting with neck mass.    Clinical Indicators:  IM PN ?patient's thyroid nodule has grown 44% in volume over past 2 months; multinodular goiter with superimposed thyroiditis?     IM PN ?thyroiditis does not seem to be improving?  ?may indicate thyroiditis is not infectious in nature, but etiology of inflammatory process is unclear.?     post procedure note ?Purulent appearing aspirate and core samples sent to pathology,?     DCS ?neck pain and swelling improved on day 4 of admission?  ?given IV vancomycin due to concern for infection due to leukocytosis?  ?CT neck w/IV showed large left thyroid lobe mas measuring 7.0 x 6.3 x 10.5 cm resulting in significant rightward deviation of aerodigestive tract and leftward deviation of carotid space structures, mild narrowing of trachea.?  ?switched to levofloxacin 750 daily for 5 days.?  ? showed decreased WBCs to 11.0?  ?IV dexamethasone 4 mg was started on day 3 of admission?  ?pain/swelling worsened over the weekend  back to level prior to steroids.?  ?patient's neck mass was biopsied via IR? on 9/24  ?Steroids were stopped at discharge.?  ?Patient sent home with 5 days of 5mg oxy q6h PRN for pain control?    9/24 pathology report final diagnosis ?Acute inflammation, necrosis and hemorrhage: Soft tissue (left thyroid) biopsy?  9/24 non gynecologic cytology report final result ?A. THYROID FINE NEEDLE ASPIRATION LEFT MID LOBE  No malignant cells identified  Abundant acute inflammatory cells and few sheets of benign follicular cells present?    9/16 CRP 36.83  Thyroxine trend 1.29, 1.33, 1.31, 1.31  TSH trend 0.67, 0.26, 1.30  Triiodothyronine, free 9/18 2.6, 9/20 1.9  9/16 Thyroglobulin 23951.1  9/16 ESR >130  9/16 WBC 17.4, 9/18 WBC 11.0  9/17 BC x2 No growth    Treatment: IR guided neck biopsy. Monitor CT imaging. Monitor labwork. IV Vancomycin given 9/16-9/17. IV Levofloxacin QD 9/17-9/21. IV dexamethasone 4mg 9/19-9/21. Pain control regimen plan.    Risk Factors: L neck mass.  Options provided:  -- Neck mass related to multinodular goiter  -- Neck mass related to subacute thyroiditis  -- Neck mass related to other etiology, (please specify), specified as,  -- Other - I will add my own diagnosis  -- Refer to Clinical Documentation Reviewer    Query created by: Yasmine Mcgowan on 10/7/2024 6:14 PM      Electronically signed by:  SANDY PAGE MD 10/8/2024 10:11 AM

## 2024-10-08 NOTE — PROGRESS NOTES
Spoke to patient. Surgery date arranged for 10/21.  Surgery information reviewed and will be mailed/emailed to patient.

## 2024-10-08 NOTE — PROGRESS NOTES
Patient here for follow-up visit    Recently admitted for significant increase in size of her left sided neck mass    She was scheduled from the office for CT scan but due to the onset of this pain presented to the emergency department and was admitted    TSH was suppressed    Endocrinology was consulted and case was discussed with concern for acute thyroiditis patient responded to steroids and conservative treatment with normalization of the TSH    Underwent biopsy which on needle aspiration and core was consistent with abundant inflammatory changes hemorrhage and no malignant cells noted, benign follicular cells reported      CT scan has been reviewed with patient as well as her family showing significant left-sided goiter with tracheal deviation to the right, some substernal extension        Patient reports continued pain and this is coincident with stopping her steroid taper this past Friday    Throat bothers her a little bit              Physical Exam:  CONSTITUTIONAL:  No acute distress  VOICE:  No hoarseness or other abnormality  RESPIRATION:  Breathing comfortably, no stridor  CV:  No clubbing/cyanosis/edema in hands  EYES:  EOM intact, sclera normal  NEURO:  Alert and oriented times 3, Cranial nerves II-XII grossly intact and symmetric bilaterally  HEAD AND FACE:  Symmetric facial features, no masses or lesions, sinuses non-tender to palpation  SALIVARY GLANDS:  Parotid and submandibular glands normal bilaterally  EARS:  Normal external ears, external auditory canals, and TMs to otoscopy, normal hearing to whispered voice.  NOSE:  External nose midline, anterior rhinoscopy is normal with limited visualization to the anterior aspect of the interior turbinates, no bleeding or drainage, no lesions  ORAL CAVITY/OROPHARYNX/LIPS:  Normal mucous membranes, normal floor of mouth/tongue/OP, no masses or lesions  PHARYNGEAL WALLS:  No masses or lesions  NECK/LYMPH:  No LAD, large left-sided goiter noted, trachea  midline  SKIN:  Neck skin is without scar or injury  PSYCH:  Alert and oriented with appropriate mood and affect    Flexible fiberoptic laryngopharyngoscopy was performed via the nares. After topical anesthesia and decongestant was instilled:    Nasopharynx:Eustachian tube orifice normal, fossa of Rosenmueller clear, mucosa clean, no masses appreciated  Palate: Nasopharyngeal surface of palate clear  Tongue base vallecula clear, lingual surface of epiglottis normal, oropharynx clear  Larynx laryngeal surface of epiglottis, clear, area epiglottic folds, normal, false cords clear, arytenoids, clear, vocal cords. Normal mobility, no lesions, mild reflux changes,  Hypopharynx pyriform sinuses, clear, post cricoid area clear    Scope was removed, patient tolerated the procedure well              Large left-sided goiter      Have discussed thyroidectomy      Risk have been reviewed    Will proceed with left-sided substernal thyroidectomy      Patient understands the potential for completion thyroidectomy and that despite negative core and needle aspirations final pathology could reveal thyroid cancer      Clinically this is a thyroiditis picture and we will restart her steroid given the benefit that she had from it    Will also help her with pain control          Verbal consent has been obtained      Multiple questions have been answered          Lengthy and detailed discussion was held with the patient regarding risks benefits and alternatives to thyroidectomy including the risks to the recurrent laryngeal nerve to be either unilateral or bilateral partial or complete temporary or permanent and the difference between unilateral paralysis as it relates to hoarseness and bilateral paralysis as it relates to potential breathing problems and rare need for tracheotomy. Also discussed hypocalcemia on a temporary or permanent basis as well as need for thyroid replacement, bleeding, infection other perioperative risks including  death based on medical comorbidities etc. patient expressed an understanding. Verbal consent has been obtained     Based on the discussion we will proceed with left thyroidectomy and leave the right side intact to minimize risk especially given expected potential inflammatory changes that we may encounter despite an interval of waiting for these to resolve

## 2024-10-10 ENCOUNTER — CLINICAL SUPPORT (OUTPATIENT)
Dept: PREADMISSION TESTING | Facility: HOSPITAL | Age: 44
End: 2024-10-10
Payer: COMMERCIAL

## 2024-10-10 DIAGNOSIS — E04.2 NONTOXIC MULTINODULAR GOITER: ICD-10-CM

## 2024-10-10 RX ORDER — CYANOCOBALAMIN (VITAMIN B-12) 250 MCG
500 TABLET ORAL DAILY
COMMUNITY

## 2024-10-10 RX ORDER — ASPIRIN 325 MG
50 TABLET, DELAYED RELEASE (ENTERIC COATED) ORAL DAILY
COMMUNITY

## 2024-10-11 ENCOUNTER — PATIENT MESSAGE (OUTPATIENT)
Dept: OTOLARYNGOLOGY | Facility: CLINIC | Age: 44
End: 2024-10-11

## 2024-10-11 ENCOUNTER — DOCUMENTATION (OUTPATIENT)
Dept: PREADMISSION TESTING | Facility: HOSPITAL | Age: 44
End: 2024-10-11

## 2024-10-11 ENCOUNTER — LAB (OUTPATIENT)
Dept: LAB | Facility: LAB | Age: 44
End: 2024-10-11
Payer: COMMERCIAL

## 2024-10-11 ENCOUNTER — PRE-ADMISSION TESTING (OUTPATIENT)
Dept: PREADMISSION TESTING | Facility: HOSPITAL | Age: 44
End: 2024-10-11
Payer: COMMERCIAL

## 2024-10-11 VITALS
SYSTOLIC BLOOD PRESSURE: 135 MMHG | RESPIRATION RATE: 18 BRPM | HEIGHT: 66 IN | OXYGEN SATURATION: 99 % | WEIGHT: 239.86 LBS | TEMPERATURE: 96.6 F | HEART RATE: 75 BPM | DIASTOLIC BLOOD PRESSURE: 82 MMHG | BODY MASS INDEX: 38.55 KG/M2

## 2024-10-11 DIAGNOSIS — Z01.818 PREOP TESTING: Primary | ICD-10-CM

## 2024-10-11 DIAGNOSIS — Z01.818 PREOP TESTING: ICD-10-CM

## 2024-10-11 LAB
ABO GROUP (TYPE) IN BLOOD: NORMAL
ANTIBODY SCREEN: NORMAL
BASOPHILS # BLD AUTO: 0.04 X10*3/UL (ref 0–0.1)
BASOPHILS NFR BLD AUTO: 0.4 %
EOSINOPHIL # BLD AUTO: 0.02 X10*3/UL (ref 0–0.7)
EOSINOPHIL NFR BLD AUTO: 0.2 %
ERYTHROCYTE [DISTWIDTH] IN BLOOD BY AUTOMATED COUNT: 28 % (ref 11.5–14.5)
HCT VFR BLD AUTO: 31.7 % (ref 36–46)
HGB BLD-MCNC: 9 G/DL (ref 12–16)
IMM GRANULOCYTES # BLD AUTO: 0.05 X10*3/UL (ref 0–0.7)
IMM GRANULOCYTES NFR BLD AUTO: 0.5 % (ref 0–0.9)
LYMPHOCYTES # BLD AUTO: 1.43 X10*3/UL (ref 1.2–4.8)
LYMPHOCYTES NFR BLD AUTO: 14.1 %
MCH RBC QN AUTO: 22 PG (ref 26–34)
MCHC RBC AUTO-ENTMCNC: 28.4 G/DL (ref 32–36)
MCV RBC AUTO: 78 FL (ref 80–100)
MONOCYTES # BLD AUTO: 0.33 X10*3/UL (ref 0.1–1)
MONOCYTES NFR BLD AUTO: 3.3 %
NEUTROPHILS # BLD AUTO: 8.26 X10*3/UL (ref 1.2–7.7)
NEUTROPHILS NFR BLD AUTO: 81.5 %
NRBC BLD-RTO: 0 /100 WBCS (ref 0–0)
PLATELET # BLD AUTO: 688 X10*3/UL (ref 150–450)
POLYCHROMASIA BLD QL SMEAR: NORMAL
RBC # BLD AUTO: 4.09 X10*6/UL (ref 4–5.2)
RBC MORPH BLD: NORMAL
RH FACTOR (ANTIGEN D): NORMAL
TARGETS BLD QL SMEAR: NORMAL
WBC # BLD AUTO: 10.1 X10*3/UL (ref 4.4–11.3)

## 2024-10-11 PROCEDURE — 99204 OFFICE O/P NEW MOD 45 MIN: CPT | Performed by: PHYSICIAN ASSISTANT

## 2024-10-11 PROCEDURE — 85025 COMPLETE CBC W/AUTO DIFF WBC: CPT

## 2024-10-11 PROCEDURE — 36415 COLL VENOUS BLD VENIPUNCTURE: CPT

## 2024-10-11 PROCEDURE — 86900 BLOOD TYPING SEROLOGIC ABO: CPT

## 2024-10-11 PROCEDURE — 86901 BLOOD TYPING SEROLOGIC RH(D): CPT

## 2024-10-11 PROCEDURE — 86850 RBC ANTIBODY SCREEN: CPT

## 2024-10-11 ASSESSMENT — ENCOUNTER SYMPTOMS
COUGH: 0
UNEXPECTED WEIGHT CHANGE: 0
WEAKNESS: 0
LIGHT-HEADEDNESS: 0
DYSPNEA WITH EXERTION: 0
NAUSEA: 0
NUMBNESS: 0
NECK PAIN: 1
CONFUSION: 0
BRUISES/BLEEDS EASILY: 0
ABDOMINAL PAIN: 0
SKIN CHANGES: 0
WHEEZING: 0
LIMITED RANGE OF MOTION: 0
ARTHRALGIAS: 0
MYALGIAS: 0
CHILLS: 0
EXCESSIVE BLEEDING: 0
SHORTNESS OF BREATH: 0
CONSTIPATION: 1
EYE DISCHARGE: 0
NECK STIFFNESS: 0
BLOOD IN STOOL: 0
SINUS CONGESTION: 0
EYE PAIN: 0
WOUND: 0
PALPITATIONS: 0
VOICE CHANGE: 1
DIFFICULTY URINATING: 0
TROUBLE SWALLOWING: 0
VISUAL CHANGE: 0
FEVER: 0
DIARRHEA: 0
DOUBLE VISION: 0
ABDOMINAL DISTENTION: 0
HEMOPTYSIS: 0
VOMITING: 0
DYSURIA: 0
DYSPNEA AT REST: 0
RHINORRHEA: 0

## 2024-10-11 NOTE — PREPROCEDURE INSTRUCTIONS
Medication List            Accurate as of October 11, 2024  7:56 AM. Always use your most recent med list.                acetaminophen 325 mg tablet  Commonly known as: Tylenol  Take 3 tablets (975 mg) by mouth 3 times a day.  Medication Adjustments for Surgery: Take on the morning of surgery  Notes to patient: If needed     co-enzyme Q-10 50 mg capsule  Additional Medication Adjustments for Surgery: Take last dose 7 days before surgery     cyanocobalamin 250 mcg tablet  Commonly known as: Vitamin B-12  Medication Adjustments for Surgery: Do Not take on the morning of surgery     cyclobenzaprine 5 mg tablet  Commonly known as: Flexeril  Take 1 tablet (5 mg) by mouth as needed at bedtime for muscle spasms (Trapezius tightness/pain).  Medication Adjustments for Surgery: Take on the morning of surgery  Notes to patient: If needed     diclofenac sodium 1 % gel  Commonly known as: Voltaren  Apply 4.5 inches (4 g) topically 4 times a day as needed (neck pain).  Medication Adjustments for Surgery: Do Not take on the morning of surgery     ferrous sulfate (325 mg ferrous sulfate) tablet  Take 1 tablet (325 mg) by mouth once daily with breakfast.  Medication Adjustments for Surgery: Do Not take on the morning of surgery     FIBER ORAL  Medication Adjustments for Surgery: Do Not take on the morning of surgery     fluticasone 50 mcg/actuation nasal spray  Commonly known as: Flonase  Medication Adjustments for Surgery: Take/Use as prescribed     gabapentin 300 mg capsule  Commonly known as: Neurontin  Take 1 capsule (300 mg) by mouth every 8 hours.  Medication Adjustments for Surgery: Take on the morning of surgery     lactobacillus acidophilus tablet tablet  Take 1 tablet by mouth 2 times a day.  Medication Adjustments for Surgery: Do Not take on the morning of surgery     lidocaine 4 % patch  Place 1 patch over 12 hours on the skin once daily. Remove & discard patch within 12 hours or as directed by MD.  Medication  Adjustments for Surgery: Do Not take on the morning of surgery     multivitamin tablet  Additional Medication Adjustments for Surgery: Take last dose 7 days before surgery     naproxen 250 mg tablet  Commonly known as: Naprosyn  Take 1 tablet (250 mg) by mouth 2 times daily (morning and late afternoon).  Additional Medication Adjustments for Surgery: Take last dose 7 days before surgery     predniSONE 10 mg tablet  Commonly known as: Deltasone  Take 4 tabs (40 mg) daily for 3 days, then take 3 tabs (30 mg) daily for 3 days. Continue to decrease by 1 tab (10mg) every 3 days until gone.  Medication Adjustments for Surgery: Take/Use as prescribed     traMADol 50 mg tablet  Commonly known as: Ultram  Take 2 tablets (100 mg) by mouth every 6 hours if needed for severe pain (7 - 10) for up to 7 days.  Medication Adjustments for Surgery: Take on the morning of surgery  Notes to patient: If needed     VITAMIN D3 ORAL  Medication Adjustments for Surgery: Do Not take on the morning of surgery                          **Concerning above medication instructions, if medication is normally taken at night, continue normal schedule.**  **DO NOT TAKE NIGHT PRIOR AND MORNING OF SURGERY**    CONTACT SURGEON'S OFFICE IF YOU DEVELOP:  * Fever = 100.4 F   * New respiratory symptoms (e.g. cough, shortness of breath, respiratory distress, sore throat)  * Recent loss of taste or smell  *Flu like symptoms such as headache, fatigue or gastrointestinal symptoms  * You develop any open sores, shingles, burning or painful urination   AND/OR:  * You no longer wish to have the surgery.  * Any other personal circumstances change that may lead to the need to cancel or defer this surgery.  *You were admitted to any hospital within one week of your planned procedure.    SMOKING:  *Quitting smoking can make a huge difference to your health and recovery from surgery.    *If you need help with quitting, call 0-688-QUIT-NOW.    THE DAY BEFORE  SURGERY:  *Do not eat any food after midnight the night before surgery.   *You are permitted to drink clear liquids (i.e. water, black coffee (no milk or cream), tea, apple juice and electrolyte drinks (gatorade)) up to 13.5 ounces, up to 2 hours before your arrival time.  *You may chew gum until 2 hours before your surgery    SURGICAL TIME  *You will be contacted between 2 p.m. and 6 p.m. the business day before your surgery with your arrival time.  *If you haven't received a call by 6pm, call [phone].  *Scheduled surgery times may change and you will be notified if this occurs-check your personal voicemail for any updates.    ON THE MORNING OF SURGERY:  *Wear comfortable, loose fitting clothing.   *Do not use moisturizers, creams, lotions or perfume.  *All jewelry and valuables should be left at home.  *Prosthetic devices such as contact lenses, hearing aids, dentures, eyelash extensions, hairpins and body piercing must be removed before surgery.    BRING WITH YOU:  *Photo ID and insurance card  *Current list of medicines and allergies  *Pacemaker/Defibrillator/Heart stent cards  *CPAP machine and mask  *Slings/splints/crutches  *Copy of your complete Advanced Directive/DHPOA-if applicable  *Neurostimulator implant remote    PARKING AND ARRIVAL:  *Check in at the Main Entrance desk and let them know you are here for surgery.  *You will be directed to the 2nd floor surgical waiting area.    AFTER OUTPATIENT SURGERY:  *A responsible adult MUST accompany you at the time of discharge and stay with you for 24 hours after your surgery.  *You may NOT drive yourself home after surgery.  *You may use a taxi or ride sharing service ([employer], Uber) to return home ONLY if you are accompanied by a friend or family member.  *Instructions for resuming your medications will be provided by your surgeon.

## 2024-10-11 NOTE — CPM/PAT H&P
[facility]/PAT Evaluation       Name: Radha Duncan (Radha Duncan)  /Age: 1980/43 y.o.       Date of Consult: 10/11/24    Referring Provider: Dr. Gan    Surgery, Date, and Length: Left Thyroidectomy - Left , 10/21/24, 270MIN    Radha Duncan is a 43 year-old female who presents to the [facility] for perioperative risk assessment prior to surgery.    Patient presents with a primary diagnosis of goiter. Pt sought emergent medical attention 24 ([facility]) and 24 () for severe neck stiffness, pain, thyroid/goiter swelling, hoarseness and H/A. On 24, she underwent IR guided biopsy of neck mass which showed no malignant cells and abundant acute inflammatory cells and a few sheets of benign follicular cells.  WBC count was as high as 26 on 24. During her two hospital visits she was treated with steroids and Abx.  Will repeat labs today. Pt wishes to proceed with surgery as planned.     This note was created in part upon personal review of patient's medical records.      Patient is scheduled to have Left Thyroidectomy - Left    Pt is anesthesia naive.  Pt denies any past history of difficult I.V. access or unexpected hospital admissions.  NO malignant hyperthermia and or pseudocholinesterase deficiency.  +history of blood transfusions in 2024    The patient is not a Druze and will accept blood and blood products if medically indicated.     Type and screen sent.     Past Medical History:   Diagnosis Date    Anemia     heavy peroids, h&H 7.8/28.7 9/3 transfused one units PRBC    Fibroids     Goiter     IUD (intrauterine device) in place     Mass of left side of neck        Past Surgical History:   Procedure Laterality Date    INTRAUTERINE DEVICE INSERTION      THYROID SURGERY Left     biopsy 24       Patient  reports being sexually active. She reports using the following method of birth control/protection: I.U.D..    Family History   Problem Relation Name  Age of Onset    Hypertension Mother      No Known Problems Father      Other (htn) Brother      No Known Problems Brother      Hypertension Mother's Sister      Breast cancer Mother's Sister      Malig Hypertension Mother's Brother       Social History     Socioeconomic History    Marital status:      Spouse name: Not on file    Number of children: Not on file    Years of education: Not on file    Highest education level: Not on file   Occupational History    Occupation: Human resources   Tobacco Use    Smoking status: Never    Smokeless tobacco: Never   Vaping Use    Vaping status: Never Used   Substance and Sexual Activity    Alcohol use: Not Currently     Comment: 2/month    Drug use: Never    Sexual activity: Yes     Birth control/protection: I.U.D.   Other Topics Concern    Not on file   Social History Narrative    Not on file     Social Determinants of Health     Financial Resource Strain: Low Risk  (9/18/2024)    Overall Financial Resource Strain (CARDIA)     Difficulty of Paying Living Expenses: Not hard at all   Food Insecurity: Not on file   Transportation Needs: No Transportation Needs (9/18/2024)    PRAPARE - Transportation     Lack of Transportation (Medical): No     Lack of Transportation (Non-Medical): No   Physical Activity: Not on file   Stress: Not on file   Social Connections: Not on file   Intimate Partner Violence: Not on file   Housing Stability: Low Risk  (9/18/2024)    Housing Stability Vital Sign     Unable to Pay for Housing in the Last Year: No     Number of Times Moved in the Last Year: 1     Homeless in the Last Year: No        Allergies   Allergen Reactions    Penicillins Hives and Swelling     Eye swelling         Current Outpatient Medications:     acetaminophen (Tylenol) 325 mg tablet, Take 3 tablets (975 mg) by mouth 3 times a day., Disp: 270 tablet, Rfl: 1    cholecalciferol, vitamin D3, (VITAMIN D3 ORAL), Take 1 tablet by mouth once daily., Disp: , Rfl:     co-enzyme Q-10  50 mg capsule, Take 1 capsule (50 mg) by mouth once daily., Disp: , Rfl:     cyanocobalamin (Vitamin B-12) 250 mcg tablet, Take 2 tablets (500 mcg) by mouth once daily., Disp: , Rfl:     cyclobenzaprine (Flexeril) 5 mg tablet, Take 1 tablet (5 mg) by mouth as needed at bedtime for muscle spasms (Trapezius tightness/pain)., Disp: 30 tablet, Rfl: 0    diclofenac sodium (Voltaren) 1 % gel, Apply 4.5 inches (4 g) topically 4 times a day as needed (neck pain)., Disp: 30 g, Rfl: 0    ferrous sulfate, 325 mg ferrous sulfate, tablet, Take 1 tablet (325 mg) by mouth once daily with breakfast., Disp: 90 tablet, Rfl: 2    gabapentin (Neurontin) 300 mg capsule, Take 1 capsule (300 mg) by mouth every 8 hours., Disp: 90 capsule, Rfl: 0    lactobacillus acidophilus tablet tablet, Take 1 tablet by mouth 2 times a day., Disp: 60 tablet, Rfl: 1    lidocaine 4 % patch, Place 1 patch over 12 hours on the skin once daily. Remove & discard patch within 12 hours or as directed by MD. (Patient taking differently: Place 1 patch on the skin if needed. Remove & discard patch within 12 hours or as directed by MD.), Disp: 30 patch, Rfl: 0    multivitamin tablet, Take 1 tablet by mouth once daily., Disp: , Rfl:     naproxen (Naprosyn) 250 mg tablet, Take 1 tablet (250 mg) by mouth 2 times daily (morning and late afternoon)., Disp: 60 tablet, Rfl: 0    predniSONE (Deltasone) 10 mg tablet, Take 4 tabs (40 mg) daily for 3 days, then take 3 tabs (30 mg) daily for 3 days. Continue to decrease by 1 tab (10mg) every 3 days until gone., Disp: 30 tablet, Rfl: 0    psyllium seed, with sugar, (FIBER ORAL), Take 1 Dose by mouth once daily as needed., Disp: , Rfl:     traMADol (Ultram) 50 mg tablet, Take 2 tablets (100 mg) by mouth every 6 hours if needed for severe pain (7 - 10) for up to 7 days., Disp: 56 tablet, Rfl: 0    fluticasone (Flonase) 50 mcg/actuation nasal spray, Administer 1 spray into each nostril once daily. Shake gently. Before first use,  prime pump. After use, clean tip and replace cap., Disp: , Rfl:          PAT ROS:   Constitutional:    no fever   no chills   no unexpected weight change  Neuro/Psych:    no numbness   no weakness   no light-headedness   no confusion  Eyes:    no discharge   no pain   no vision loss   no diplopia   no visual disturbance  Ears:    no ear pain   no hearing loss   no tinnitus  Nose:    no nasal discharge   no sinus congestion   no epistaxis  Mouth:    no dental issues   no mouth pain   no oral bleeding   no mouth lesions  Throat:    no throat pain   no dysphagia   voice change  Neck:    Goiter noted which is tender to palpation   neck pain   no neck stiffness  Cardio:    Functional 4 Mets. Patient denies SOB walking up 2 flights of stairs   Was working out 5 days per week prior to hospitalization in September.  Cooking, cleaning, grocery shopping   no chest pain   no palpitations   no peripheral edema   no dyspnea   no AHMADI  Respiratory:    no cough   no wheezing   no hemoptysis   no shortness of breath  Endocrine:    no cold intolerance   no heat intolerance  GI:    no abdominal distention   no abdominal pain   constipation   no diarrhea   no nausea   no vomiting   no blood in stool  :    no difficulty urinating   no dysuria   no oliguria  Musculoskeletal:    no arthralgias   no myalgias   no decreased ROM  Hematologic:    does not bruise/bleed easily   no excessive bleeding   history of blood transfusion   no blood clots  Skin:   no skin changes   no sores/wound   no rash      Physical Exam  Constitutional:       General: She is not in acute distress.     Appearance: Normal appearance. She is not ill-appearing, toxic-appearing or diaphoretic.   HENT:      Head: Normocephalic and atraumatic.      Nose: Nose normal. No congestion or rhinorrhea.      Mouth/Throat:      Mouth: Mucous membranes are moist.      Pharynx: No posterior oropharyngeal erythema.   Eyes:      Extraocular Movements: Extraocular movements intact.  "     Conjunctiva/sclera: Conjunctivae normal.   Neck:      Comments: Goiter noted which is tender to palpation  Cardiovascular:      Rate and Rhythm: Normal rate and regular rhythm.      Pulses: Normal pulses.      Heart sounds: Normal heart sounds. No murmur heard.     No friction rub. No gallop.   Pulmonary:      Effort: Pulmonary effort is normal. No respiratory distress.      Breath sounds: Normal breath sounds. No stridor. No wheezing, rhonchi or rales.   Abdominal:      General: Bowel sounds are normal. There is no distension.      Palpations: Abdomen is soft. There is no mass.      Tenderness: There is no abdominal tenderness. There is no guarding or rebound.      Hernia: No hernia is present.   Musculoskeletal:         General: No swelling, deformity or signs of injury. Normal range of motion.      Cervical back: Normal range of motion and neck supple. Tenderness present. No rigidity.   Skin:     General: Skin is warm and dry.      Coloration: Skin is not jaundiced or pale.      Findings: No bruising, erythema or rash.   Neurological:      General: No focal deficit present.      Mental Status: She is alert and oriented to person, place, and time.      Cranial Nerves: No cranial nerve deficit.      Sensory: No sensory deficit.      Motor: No weakness.      Coordination: Coordination normal.   Psychiatric:         Mood and Affect: Mood normal.         Behavior: Behavior normal.          PAT AIRWAY:   Airway:     Mallampati::  II    Neck ROM::  Full   No broken teeth, no dentures and no missing teeth          Visit Vitals  /82   Pulse 75   Temp 35.9 °C (96.6 °F)   Resp 18   Ht 1.676 m (5' 6\")   Wt 109 kg (239 lb 13.8 oz)   LMP 09/04/2024 (Approximate)   SpO2 99%   BMI 38.71 kg/m²   OB Status Having periods   Smoking Status Never   BSA 2.25 m²        LABS:  Lab Results   Component Value Date    GLUCOSE 88 09/25/2024    CALCIUM 9.1 09/25/2024     09/25/2024    K 5.0 09/25/2024    CO2 32 09/25/2024    "  09/25/2024    BUN 14 09/25/2024    CREATININE 0.75 09/25/2024            Component  Ref Range & Units 2 wk ago 1 mo ago 5 yr ago   Iron  35 - 150 ug/dL 20 Low  24 Low  26 Low    UIBC  110 - 370 ug/dL 252 >450 High     TIBC  240 - 445 ug/dL 272 R, CM    % Saturation  25 - 45 % 7 Low  R, CM    Resulting Agency [facility]              Specimen Collected: 09/24/24 07:35        Lab Results   Component Value Date    WBC 10.1 10/11/2024    HGB 9.0 (L) 10/11/2024    HCT 31.7 (L) 10/11/2024    MCV 78 (L) 10/11/2024     (H) 10/11/2024      EKG 9/17/24          Assessment and Plan:     Patient is a 43-year-old female scheduled for a Left Thyroidectomy - Left with Dr. Gan on 10/21/24.    Patient has no active cardiac symptoms.   Patient denies any chest pain, tightness, heaviness, pressure, radiating pain, palpitations, irregular heartbeats, lightheadedness, cough, congestion, shortness of breath, AHMADI, PND, near syncope, weight loss or gain.     RCRI  0  , 3.9 % Risk of MACE    Hematology:  Anemia - hold Fe supplement dos  9/25/24 H/H 7.7/27.4%  10/11/24 H/H 9.0/31.7%    Thrombocytosis  9/25/24 PLT 1117  10/11/24  (trending down appropriately)    Patient instructed to ambulate as soon as possible postoperatively to decrease thromboembolic risk.   Initiate mechanical DVT prophylaxis as soon as possible and initiate chemical prophylaxis when deemed safe from a bleeding standpoint post surgery.     LABS: BMP reviewed from 9/25/24 and unremarkable; no need to repeat.  CBC, T&S ordered.  Lab results reviewed and show thrombocytosis is resolving.  Still with ongoing anemia, which is trending appropriately as well.    STOP BANG: obese = 1    Caprini: 4    Risk assessment complete.  Patient is scheduled for a intermediate surgical risk procedure.        Preoperative medication instructions were provided and reviewed with the patient.  Any additional testing or evaluation was explained to the  patient.  Nothing by mouth instructions were discussed and patient's questions were answered prior to conclusion to this encounter.  Patient verbalized understanding of preoperative instructions given in preadmission testing; discharge instructions available in EMR.    This note was dictated by a speech recognition.  Minor errors may have been detected in a speech recognition.

## 2024-10-11 NOTE — CPM/PAT NURSE NOTE
CPM/PAT Nurse Note      Name: Radha Duncan (Radha Duncan)  /Age: 1980/43 y.o.       Past Medical History:   Diagnosis Date    Anemia     heavy peroids, h&H 7.8/28.7 9/3 transfused one units PRBC    Fibroids     IUD (intrauterine device) in place     Mass of left side of neck        Past Surgical History:   Procedure Laterality Date    INTRAUTERINE DEVICE INSERTION      THYROID SURGERY Left     biopsy 24       Patient  reports being sexually active. She reports using the following method of birth control/protection: I.U.D..    Family History   Problem Relation Name Age of Onset    Hypertension Mother      No Known Problems Father      Other (htn) Brother      No Known Problems Brother      Hypertension Mother's Sister      Breast cancer Mother's Sister      Malig Hypertension Mother's Brother         Allergies   Allergen Reactions    Penicillins Hives and Swelling     Eye swelling       Prior to Admission medications    Medication Sig Start Date End Date Taking? Authorizing Provider   acetaminophen (Tylenol) 325 mg tablet Take 3 tablets (975 mg) by mouth 3 times a day. 24  Michael Reid MD   cholecalciferol, vitamin D3, (VITAMIN D3 ORAL) Take 1 tablet by mouth once daily.    Historical Provider, MD   co-enzyme Q-10 50 mg capsule Take 1 capsule (50 mg) by mouth once daily.    Historical Provider, MD   cyanocobalamin (Vitamin B-12) 250 mcg tablet Take 2 tablets (500 mcg) by mouth once daily.    Historical Provider, MD   cyclobenzaprine (Flexeril) 5 mg tablet Take 1 tablet (5 mg) by mouth as needed at bedtime for muscle spasms (Trapezius tightness/pain). 9/25/24 10/25/24  Michael Reid MD   diclofenac sodium (Voltaren) 1 % gel Apply 4.5 inches (4 g) topically 4 times a day as needed (neck pain). 9/25/24 10/25/24  Michael Reid MD   ferrous sulfate, 325 mg ferrous sulfate, tablet Take 1 tablet (325 mg) by mouth once daily with breakfast. 24   Michael Reid MD    fluticasone (Flonase) 50 mcg/actuation nasal spray Administer 1 spray into each nostril once daily. Shake gently. Before first use, prime pump. After use, clean tip and replace cap.    Historical Provider, MD   gabapentin (Neurontin) 300 mg capsule Take 1 capsule (300 mg) by mouth every 8 hours. 9/25/24 10/25/24  Michael Reid MD   lactobacillus acidophilus tablet tablet Take 1 tablet by mouth 2 times a day. 9/25/24 11/24/24  Michael Reid MD   lidocaine 4 % patch Place 1 patch over 12 hours on the skin once daily. Remove & discard patch within 12 hours or as directed by MD.  Patient taking differently: Place 1 patch on the skin if needed. Remove & discard patch within 12 hours or as directed by MD. 9/26/24 10/26/24  Michael Reid MD   multivitamin tablet Take 1 tablet by mouth once daily.    Historical Provider, MD   naproxen (Naprosyn) 250 mg tablet Take 1 tablet (250 mg) by mouth 2 times daily (morning and late afternoon). 9/25/24 10/25/24  Michael Reid MD   predniSONE (Deltasone) 10 mg tablet Take 4 tabs (40 mg) daily for 3 days, then take 3 tabs (30 mg) daily for 3 days. Continue to decrease by 1 tab (10mg) every 3 days until gone. 10/7/24 10/17/24  Aris Gan MD   psyllium seed, with sugar, (FIBER ORAL) Take 1 Dose by mouth once daily as needed.    Historical Provider, MD   traMADol (Ultram) 50 mg tablet Take 2 tablets (100 mg) by mouth every 6 hours if needed for severe pain (7 - 10) for up to 7 days. 10/7/24 10/14/24  Aris Gan MD   ferrous sulfate 325 (65 Fe) MG EC tablet Take 1 tablet by mouth every other day. 9/12/24 10/11/24  Historical Provider, MD   oxyCODONE (Roxicodone) 5 mg immediate release tablet Take 1 tablet (5 mg) by mouth 5 times a day for 14 days. 9/26/24 10/7/24  MD STARR Montez     DASI Risk Score      Flowsheet Row Questionnaire Series Submission from 10/8/2024 in Virtual Care with Generic Provider Mycr[facility]   Can you take care of yourself (eat, dress,  bathe, or use toilet)?  2.75  filed at 10/08/2024 1317   Can you walk indoors, such as around your house? 1.75  filed at 10/08/2024 1317   Can you walk a block or two on level ground?  2.75  filed at 10/08/2024 1317   Can you climb a flight of stairs or walk up a hill? 5.5  filed at 10/08/2024 1317   Can you run a short distance? 8  filed at 10/08/2024 1317   Can you do light work around the house like dusting or washing dishes? 2.7  filed at 10/08/2024 1317   Can you do moderate work around the house like vacuuming, sweeping floors or carrying groceries? 3.5  filed at 10/08/2024 1317   Can you do heavy work around the house like scrubbing floors or lifting and moving heavy furniture?  8  filed at 10/08/2024 1317   Can you do yard work like raking leaves, weeding or pushing a mower? 4.5  filed at 10/08/2024 1317   Can you have sexual relations? 5.25  filed at 10/08/2024 1317   Can you participate in moderate recreational activities like golf, bowling, dancing, doubles tennis or throwing a baseball or football? 6  filed at 10/08/2024 1317   Can you participate in strenous sports like swimming, singles tennis, football, basketball, or skiing? 7.5  filed at 10/08/2024 1317   DASI SCORE 58.2  filed at 10/08/2024 1317   METS Score (Will be calculated only when all the questions are answered) 9.9  filed at 10/08/2024 1317          Caprini DVT Assessment      Flowsheet Row ED to Hosp-Admission (Discharged) from 9/16/2024 in [facility] with Mihir Stephens MD, Jarod Olivarez MD and Yadi Galvan,    DVT Score 4 filed at 09/17/2024 0026   BMI 31-40 (Obesity) filed at 09/17/2024 0026   RETIRED: Age 40-59 years filed at 09/17/2024 0026          Modified Frailty Index    No data to display       CHADS2 Stroke Risk  Current as of just now        N/A 3 to 100%: High Risk   2 to < 3%: Medium Risk   0 to < 2%: Low Risk     Last Change: N/A          This score determines the patient's risk of having  a stroke if the patient has atrial fibrillation.        This score is not applicable to this patient. Components are not calculated.          Revised Cardiac Risk Index    No data to display       Apfel Simplified Score    No data to display       Risk Analysis Index Results This Encounter    No data found in the last 10 encounters.       Stop Bang Score      Flowsheet Row Questionnaire Series Submission from 10/8/2024 in [facility] with Generic Provider Sisi   Do you snore loudly? 0  filed at 10/08/2024 1317   Do you often feel tired or fatigued after your sleep? 0  filed at 10/08/2024 1317   Has anyone ever observed you stop breathing in your sleep? 0  filed at 10/08/2024 1317   Do you have or are you being treated for high blood pressure? 0  filed at 10/08/2024 1317   Recent BMI (Calculated) 39.6  filed at 10/08/2024 1317   Is BMI greater than 35 kg/m2? 1=Yes  filed at 10/08/2024 1317   Age older than 50 years old? 0=No  filed at 10/08/2024 1317   Gender - Male 0=No  filed at 10/08/2024 1317            Nurse Plan of Action:       After Visit Summary (AVS) reviewed and patient verbalized good understanding of medications and NPO instructions.

## 2024-10-17 DIAGNOSIS — R22.1 NECK MASS: ICD-10-CM

## 2024-10-17 DIAGNOSIS — E04.2 NONTOXIC MULTINODULAR GOITER: ICD-10-CM

## 2024-10-17 RX ORDER — TRAMADOL HYDROCHLORIDE 50 MG/1
100 TABLET ORAL EVERY 6 HOURS PRN
Qty: 56 TABLET | Refills: 0 | Status: SHIPPED | OUTPATIENT
Start: 2024-10-17 | End: 2024-10-22 | Stop reason: HOSPADM

## 2024-10-17 NOTE — PROGRESS NOTES
Patient requesting refill on tramadol.  Ok per Dr. Gan.  Prescriptions submitted for his signature.

## 2024-10-19 ENCOUNTER — ANESTHESIA EVENT (OUTPATIENT)
Dept: OPERATING ROOM | Facility: HOSPITAL | Age: 44
End: 2024-10-19
Payer: COMMERCIAL

## 2024-10-21 ENCOUNTER — ANESTHESIA (OUTPATIENT)
Dept: OPERATING ROOM | Facility: HOSPITAL | Age: 44
End: 2024-10-21
Payer: COMMERCIAL

## 2024-10-21 ENCOUNTER — HOSPITAL ENCOUNTER (OUTPATIENT)
Facility: HOSPITAL | Age: 44
Discharge: HOME | End: 2024-10-22
Attending: OTOLARYNGOLOGY | Admitting: OTOLARYNGOLOGY
Payer: COMMERCIAL

## 2024-10-21 DIAGNOSIS — E04.2 NONTOXIC MULTINODULAR GOITER: Primary | ICD-10-CM

## 2024-10-21 DIAGNOSIS — E89.0 S/P PARTIAL THYROIDECTOMY: ICD-10-CM

## 2024-10-21 LAB
ALBUMIN SERPL BCP-MCNC: 3.6 G/DL (ref 3.4–5)
ANION GAP SERPL CALC-SCNC: 12 MMOL/L (ref 10–20)
BUN SERPL-MCNC: 10 MG/DL (ref 6–23)
CALCIUM SERPL-MCNC: 8.9 MG/DL (ref 8.6–10.3)
CHLORIDE SERPL-SCNC: 100 MMOL/L (ref 98–107)
CO2 SERPL-SCNC: 24 MMOL/L (ref 21–32)
CREAT SERPL-MCNC: 0.68 MG/DL (ref 0.5–1.05)
EGFRCR SERPLBLD CKD-EPI 2021: >90 ML/MIN/1.73M*2
GLUCOSE SERPL-MCNC: 133 MG/DL (ref 74–99)
PHOSPHATE SERPL-MCNC: 2.9 MG/DL (ref 2.5–4.9)
POTASSIUM SERPL-SCNC: 4.2 MMOL/L (ref 3.5–5.3)
PREGNANCY TEST URINE, POC: NEGATIVE
SODIUM SERPL-SCNC: 132 MMOL/L (ref 136–145)

## 2024-10-21 PROCEDURE — 3700000002 HC GENERAL ANESTHESIA TIME - EACH INCREMENTAL 1 MINUTE: Performed by: OTOLARYNGOLOGY

## 2024-10-21 PROCEDURE — 88307 TISSUE EXAM BY PATHOLOGIST: CPT | Mod: TC,AHULAB,WESLAB | Performed by: OTOLARYNGOLOGY

## 2024-10-21 PROCEDURE — 88331 PATH CONSLTJ SURG 1 BLK 1SPC: CPT | Performed by: PATHOLOGY

## 2024-10-21 PROCEDURE — 88305 TISSUE EXAM BY PATHOLOGIST: CPT | Performed by: PATHOLOGY

## 2024-10-21 PROCEDURE — 60512 AUTOTRANSPLANT PARATHYROID: CPT | Performed by: OTOLARYNGOLOGY

## 2024-10-21 PROCEDURE — 7100000011 HC EXTENDED STAY RECOVERY HOURLY - NURSING UNIT

## 2024-10-21 PROCEDURE — 80069 RENAL FUNCTION PANEL: CPT

## 2024-10-21 PROCEDURE — 9420000001 HC RT PATIENT EDUCATION 5 MIN

## 2024-10-21 PROCEDURE — 2500000004 HC RX 250 GENERAL PHARMACY W/ HCPCS (ALT 636 FOR OP/ED): Performed by: NURSE ANESTHETIST, CERTIFIED REGISTERED

## 2024-10-21 PROCEDURE — 81025 URINE PREGNANCY TEST: CPT | Performed by: OTOLARYNGOLOGY

## 2024-10-21 PROCEDURE — 2720000007 HC OR 272 NO HCPCS: Performed by: OTOLARYNGOLOGY

## 2024-10-21 PROCEDURE — 88307 TISSUE EXAM BY PATHOLOGIST: CPT | Performed by: PATHOLOGY

## 2024-10-21 PROCEDURE — 99231 SBSQ HOSP IP/OBS SF/LOW 25: CPT

## 2024-10-21 PROCEDURE — 7100000002 HC RECOVERY ROOM TIME - EACH INCREMENTAL 1 MINUTE: Performed by: OTOLARYNGOLOGY

## 2024-10-21 PROCEDURE — 3600000004 HC OR TIME - INITIAL BASE CHARGE - PROCEDURE LEVEL FOUR: Performed by: OTOLARYNGOLOGY

## 2024-10-21 PROCEDURE — 2500000005 HC RX 250 GENERAL PHARMACY W/O HCPCS: Performed by: NURSE ANESTHETIST, CERTIFIED REGISTERED

## 2024-10-21 PROCEDURE — 2500000001 HC RX 250 WO HCPCS SELF ADMINISTERED DRUGS (ALT 637 FOR MEDICARE OP): Performed by: OTOLARYNGOLOGY

## 2024-10-21 PROCEDURE — 2500000004 HC RX 250 GENERAL PHARMACY W/ HCPCS (ALT 636 FOR OP/ED): Performed by: STUDENT IN AN ORGANIZED HEALTH CARE EDUCATION/TRAINING PROGRAM

## 2024-10-21 PROCEDURE — 88332 PATH CONSLTJ SURG EA ADD BLK: CPT | Mod: TC,SUR,AHULAB | Performed by: PATHOLOGY

## 2024-10-21 PROCEDURE — 2500000001 HC RX 250 WO HCPCS SELF ADMINISTERED DRUGS (ALT 637 FOR MEDICARE OP): Performed by: STUDENT IN AN ORGANIZED HEALTH CARE EDUCATION/TRAINING PROGRAM

## 2024-10-21 PROCEDURE — 2500000004 HC RX 250 GENERAL PHARMACY W/ HCPCS (ALT 636 FOR OP/ED): Performed by: OTOLARYNGOLOGY

## 2024-10-21 PROCEDURE — 3600000009 HC OR TIME - EACH INCREMENTAL 1 MINUTE - PROCEDURE LEVEL FOUR: Performed by: OTOLARYNGOLOGY

## 2024-10-21 PROCEDURE — 2500000005 HC RX 250 GENERAL PHARMACY W/O HCPCS: Performed by: OTOLARYNGOLOGY

## 2024-10-21 PROCEDURE — 7100000001 HC RECOVERY ROOM TIME - INITIAL BASE CHARGE: Performed by: OTOLARYNGOLOGY

## 2024-10-21 PROCEDURE — A60220 PR THYROID LOBECTOMY,UNILAT: Performed by: ANESTHESIOLOGY

## 2024-10-21 PROCEDURE — A60220 PR THYROID LOBECTOMY,UNILAT: Performed by: NURSE ANESTHETIST, CERTIFIED REGISTERED

## 2024-10-21 PROCEDURE — 3700000001 HC GENERAL ANESTHESIA TIME - INITIAL BASE CHARGE: Performed by: OTOLARYNGOLOGY

## 2024-10-21 PROCEDURE — 88332 PATH CONSLTJ SURG EA ADD BLK: CPT | Performed by: PATHOLOGY

## 2024-10-21 PROCEDURE — 36415 COLL VENOUS BLD VENIPUNCTURE: CPT

## 2024-10-21 PROCEDURE — 2500000004 HC RX 250 GENERAL PHARMACY W/ HCPCS (ALT 636 FOR OP/ED): Performed by: ANESTHESIOLOGY

## 2024-10-21 PROCEDURE — 2500000005 HC RX 250 GENERAL PHARMACY W/O HCPCS: Performed by: ANESTHESIOLOGY

## 2024-10-21 PROCEDURE — 96372 THER/PROPH/DIAG INJ SC/IM: CPT | Performed by: STUDENT IN AN ORGANIZED HEALTH CARE EDUCATION/TRAINING PROGRAM

## 2024-10-21 PROCEDURE — 60271 REMOVAL OF THYROID: CPT | Performed by: OTOLARYNGOLOGY

## 2024-10-21 RX ORDER — ACETAMINOPHEN 325 MG/1
650 TABLET ORAL EVERY 6 HOURS PRN
Qty: 112 TABLET | Refills: 0 | Status: SHIPPED | OUTPATIENT
Start: 2024-10-21 | End: 2024-11-04

## 2024-10-21 RX ORDER — OXYCODONE HCL 5 MG/5 ML
5 SOLUTION, ORAL ORAL EVERY 4 HOURS PRN
Status: DISCONTINUED | OUTPATIENT
Start: 2024-10-21 | End: 2024-10-22 | Stop reason: HOSPADM

## 2024-10-21 RX ORDER — OXYCODONE HYDROCHLORIDE 5 MG/1
5 TABLET ORAL EVERY 4 HOURS PRN
Status: DISCONTINUED | OUTPATIENT
Start: 2024-10-21 | End: 2024-10-21 | Stop reason: HOSPADM

## 2024-10-21 RX ORDER — ACETAMINOPHEN 325 MG/1
975 TABLET ORAL EVERY 8 HOURS SCHEDULED
Status: DISCONTINUED | OUTPATIENT
Start: 2024-10-21 | End: 2024-10-22 | Stop reason: HOSPADM

## 2024-10-21 RX ORDER — ONDANSETRON HYDROCHLORIDE 2 MG/ML
4 INJECTION, SOLUTION INTRAVENOUS EVERY 8 HOURS PRN
Status: DISCONTINUED | OUTPATIENT
Start: 2024-10-21 | End: 2024-10-22 | Stop reason: HOSPADM

## 2024-10-21 RX ORDER — PANTOPRAZOLE SODIUM 40 MG/10ML
40 INJECTION, POWDER, LYOPHILIZED, FOR SOLUTION INTRAVENOUS DAILY
Status: DISCONTINUED | OUTPATIENT
Start: 2024-10-21 | End: 2024-10-22 | Stop reason: HOSPADM

## 2024-10-21 RX ORDER — HEPARIN SODIUM 5000 [USP'U]/ML
5000 INJECTION, SOLUTION INTRAVENOUS; SUBCUTANEOUS EVERY 8 HOURS
Status: DISCONTINUED | OUTPATIENT
Start: 2024-10-21 | End: 2024-10-22 | Stop reason: HOSPADM

## 2024-10-21 RX ORDER — LIDOCAINE HYDROCHLORIDE 20 MG/ML
INJECTION, SOLUTION INFILTRATION; PERINEURAL AS NEEDED
Status: DISCONTINUED | OUTPATIENT
Start: 2024-10-21 | End: 2024-10-21

## 2024-10-21 RX ORDER — ACETAMINOPHEN 160 MG/5ML
1000 SOLUTION ORAL EVERY 8 HOURS SCHEDULED
Status: DISCONTINUED | OUTPATIENT
Start: 2024-10-21 | End: 2024-10-22 | Stop reason: HOSPADM

## 2024-10-21 RX ORDER — SUCCINYLCHOLINE CHLORIDE 20 MG/ML
INJECTION INTRAMUSCULAR; INTRAVENOUS AS NEEDED
Status: DISCONTINUED | OUTPATIENT
Start: 2024-10-21 | End: 2024-10-21

## 2024-10-21 RX ORDER — DIPHENHYDRAMINE HCL 25 MG
25 CAPSULE ORAL ONCE
Status: COMPLETED | OUTPATIENT
Start: 2024-10-21 | End: 2024-10-21

## 2024-10-21 RX ORDER — IBUPROFEN 400 MG/1
400 TABLET ORAL EVERY 6 HOURS PRN
Qty: 40 TABLET | Refills: 0 | Status: SHIPPED | OUTPATIENT
Start: 2024-10-21 | End: 2024-10-31

## 2024-10-21 RX ORDER — ONDANSETRON HYDROCHLORIDE 2 MG/ML
4 INJECTION, SOLUTION INTRAVENOUS ONCE AS NEEDED
Status: DISCONTINUED | OUTPATIENT
Start: 2024-10-21 | End: 2024-10-21 | Stop reason: HOSPADM

## 2024-10-21 RX ORDER — GABAPENTIN 300 MG/1
300 CAPSULE ORAL EVERY 8 HOURS SCHEDULED
Status: DISCONTINUED | OUTPATIENT
Start: 2024-10-21 | End: 2024-10-22 | Stop reason: HOSPADM

## 2024-10-21 RX ORDER — ALBUTEROL SULFATE 0.83 MG/ML
2.5 SOLUTION RESPIRATORY (INHALATION) ONCE AS NEEDED
Status: DISCONTINUED | OUTPATIENT
Start: 2024-10-21 | End: 2024-10-21 | Stop reason: HOSPADM

## 2024-10-21 RX ORDER — LIDOCAINE HYDROCHLORIDE AND EPINEPHRINE 10; 10 MG/ML; UG/ML
INJECTION, SOLUTION INFILTRATION; PERINEURAL AS NEEDED
Status: DISCONTINUED | OUTPATIENT
Start: 2024-10-21 | End: 2024-10-21 | Stop reason: HOSPADM

## 2024-10-21 RX ORDER — OXYCODONE HYDROCHLORIDE 5 MG/1
5 TABLET ORAL EVERY 4 HOURS PRN
Status: DISCONTINUED | OUTPATIENT
Start: 2024-10-21 | End: 2024-10-22 | Stop reason: HOSPADM

## 2024-10-21 RX ORDER — PROPOFOL 10 MG/ML
INJECTION, EMULSION INTRAVENOUS AS NEEDED
Status: DISCONTINUED | OUTPATIENT
Start: 2024-10-21 | End: 2024-10-21

## 2024-10-21 RX ORDER — TRAMADOL HYDROCHLORIDE 50 MG/1
50 TABLET ORAL EVERY 8 HOURS PRN
Qty: 15 TABLET | Refills: 0 | Status: SHIPPED | OUTPATIENT
Start: 2024-10-21 | End: 2024-10-26

## 2024-10-21 RX ORDER — OXYCODONE HYDROCHLORIDE 5 MG/1
10 TABLET ORAL EVERY 6 HOURS PRN
Status: DISCONTINUED | OUTPATIENT
Start: 2024-10-21 | End: 2024-10-22 | Stop reason: HOSPADM

## 2024-10-21 RX ORDER — ONDANSETRON HYDROCHLORIDE 2 MG/ML
INJECTION, SOLUTION INTRAVENOUS AS NEEDED
Status: DISCONTINUED | OUTPATIENT
Start: 2024-10-21 | End: 2024-10-21

## 2024-10-21 RX ORDER — CYCLOBENZAPRINE HCL 5 MG
5 TABLET ORAL NIGHTLY PRN
Status: DISCONTINUED | OUTPATIENT
Start: 2024-10-21 | End: 2024-10-22 | Stop reason: HOSPADM

## 2024-10-21 RX ORDER — ONDANSETRON 4 MG/1
4 TABLET, ORALLY DISINTEGRATING ORAL EVERY 8 HOURS PRN
Status: DISCONTINUED | OUTPATIENT
Start: 2024-10-21 | End: 2024-10-22 | Stop reason: HOSPADM

## 2024-10-21 RX ORDER — FENTANYL CITRATE 50 UG/ML
INJECTION, SOLUTION INTRAMUSCULAR; INTRAVENOUS AS NEEDED
Status: DISCONTINUED | OUTPATIENT
Start: 2024-10-21 | End: 2024-10-21

## 2024-10-21 RX ORDER — MIDAZOLAM HYDROCHLORIDE 1 MG/ML
INJECTION INTRAMUSCULAR; INTRAVENOUS AS NEEDED
Status: DISCONTINUED | OUTPATIENT
Start: 2024-10-21 | End: 2024-10-21

## 2024-10-21 RX ORDER — ACETAMINOPHEN 325 MG/1
650 TABLET ORAL EVERY 4 HOURS PRN
Status: DISCONTINUED | OUTPATIENT
Start: 2024-10-21 | End: 2024-10-21 | Stop reason: HOSPADM

## 2024-10-21 RX ORDER — NALOXONE HYDROCHLORIDE 0.4 MG/ML
0.2 INJECTION, SOLUTION INTRAMUSCULAR; INTRAVENOUS; SUBCUTANEOUS EVERY 5 MIN PRN
Status: DISCONTINUED | OUTPATIENT
Start: 2024-10-21 | End: 2024-10-22 | Stop reason: HOSPADM

## 2024-10-21 RX ORDER — SODIUM CHLORIDE 0.9 G/100ML
IRRIGANT IRRIGATION AS NEEDED
Status: DISCONTINUED | OUTPATIENT
Start: 2024-10-21 | End: 2024-10-21 | Stop reason: HOSPADM

## 2024-10-21 RX ORDER — POLYETHYLENE GLYCOL 3350 17 G/17G
17 POWDER, FOR SOLUTION ORAL DAILY
Status: DISCONTINUED | OUTPATIENT
Start: 2024-10-21 | End: 2024-10-22 | Stop reason: HOSPADM

## 2024-10-21 RX ORDER — SODIUM CHLORIDE, SODIUM LACTATE, POTASSIUM CHLORIDE, CALCIUM CHLORIDE 600; 310; 30; 20 MG/100ML; MG/100ML; MG/100ML; MG/100ML
INJECTION, SOLUTION INTRAVENOUS CONTINUOUS PRN
Status: DISCONTINUED | OUTPATIENT
Start: 2024-10-21 | End: 2024-10-21

## 2024-10-21 SDOH — SOCIAL STABILITY: SOCIAL INSECURITY: ARE YOU OR HAVE YOU BEEN THREATENED OR ABUSED PHYSICALLY, EMOTIONALLY, OR SEXUALLY BY ANYONE?: NO

## 2024-10-21 SDOH — ECONOMIC STABILITY: FOOD INSECURITY: WITHIN THE PAST 12 MONTHS, THE FOOD YOU BOUGHT JUST DIDN'T LAST AND YOU DIDN'T HAVE MONEY TO GET MORE.: NEVER TRUE

## 2024-10-21 SDOH — HEALTH STABILITY: MENTAL HEALTH: HOW MANY DRINKS CONTAINING ALCOHOL DO YOU HAVE ON A TYPICAL DAY WHEN YOU ARE DRINKING?: 1 OR 2

## 2024-10-21 SDOH — HEALTH STABILITY: MENTAL HEALTH
DO YOU FEEL STRESS - TENSE, RESTLESS, NERVOUS, OR ANXIOUS, OR UNABLE TO SLEEP AT NIGHT BECAUSE YOUR MIND IS TROUBLED ALL THE TIME - THESE DAYS?: NOT AT ALL

## 2024-10-21 SDOH — SOCIAL STABILITY: SOCIAL INSECURITY: WITHIN THE LAST YEAR, HAVE YOU BEEN HUMILIATED OR EMOTIONALLY ABUSED IN OTHER WAYS BY YOUR PARTNER OR EX-PARTNER?: NO

## 2024-10-21 SDOH — SOCIAL STABILITY: SOCIAL INSECURITY
WITHIN THE LAST YEAR, HAVE YOU BEEN RAPED OR FORCED TO HAVE ANY KIND OF SEXUAL ACTIVITY BY YOUR PARTNER OR EX-PARTNER?: NO

## 2024-10-21 SDOH — ECONOMIC STABILITY: HOUSING INSECURITY: IN THE PAST 12 MONTHS, HOW MANY TIMES HAVE YOU MOVED WHERE YOU WERE LIVING?: 1

## 2024-10-21 SDOH — ECONOMIC STABILITY: INCOME INSECURITY: IN THE PAST 12 MONTHS HAS THE ELECTRIC, GAS, OIL, OR WATER COMPANY THREATENED TO SHUT OFF SERVICES IN YOUR HOME?: NO

## 2024-10-21 SDOH — ECONOMIC STABILITY: HOUSING INSECURITY: IN THE LAST 12 MONTHS, WAS THERE A TIME WHEN YOU WERE NOT ABLE TO PAY THE MORTGAGE OR RENT ON TIME?: NO

## 2024-10-21 SDOH — HEALTH STABILITY: MENTAL HEALTH: HOW OFTEN DO YOU HAVE SIX OR MORE DRINKS ON ONE OCCASION?: LESS THAN MONTHLY

## 2024-10-21 SDOH — SOCIAL STABILITY: SOCIAL INSECURITY
WITHIN THE LAST YEAR, HAVE YOU BEEN KICKED, HIT, SLAPPED, OR OTHERWISE PHYSICALLY HURT BY YOUR PARTNER OR EX-PARTNER?: NO

## 2024-10-21 SDOH — ECONOMIC STABILITY: TRANSPORTATION INSECURITY: IN THE PAST 12 MONTHS, HAS LACK OF TRANSPORTATION KEPT YOU FROM MEDICAL APPOINTMENTS OR FROM GETTING MEDICATIONS?: NO

## 2024-10-21 SDOH — SOCIAL STABILITY: SOCIAL INSECURITY: HAVE YOU HAD THOUGHTS OF HARMING ANYONE ELSE?: NO

## 2024-10-21 SDOH — SOCIAL STABILITY: SOCIAL INSECURITY: WERE YOU ABLE TO COMPLETE ALL THE BEHAVIORAL HEALTH SCREENINGS?: NO

## 2024-10-21 SDOH — SOCIAL STABILITY: SOCIAL INSECURITY: ARE THERE ANY APPARENT SIGNS OF INJURIES/BEHAVIORS THAT COULD BE RELATED TO ABUSE/NEGLECT?: NO

## 2024-10-21 SDOH — ECONOMIC STABILITY: FOOD INSECURITY: HOW HARD IS IT FOR YOU TO PAY FOR THE VERY BASICS LIKE FOOD, HOUSING, MEDICAL CARE, AND HEATING?: NOT HARD AT ALL

## 2024-10-21 SDOH — SOCIAL STABILITY: SOCIAL INSECURITY: WITHIN THE LAST YEAR, HAVE YOU BEEN AFRAID OF YOUR PARTNER OR EX-PARTNER?: NO

## 2024-10-21 SDOH — ECONOMIC STABILITY: FOOD INSECURITY: WITHIN THE PAST 12 MONTHS, YOU WORRIED THAT YOUR FOOD WOULD RUN OUT BEFORE YOU GOT THE MONEY TO BUY MORE.: NEVER TRUE

## 2024-10-21 SDOH — ECONOMIC STABILITY: HOUSING INSECURITY: AT ANY TIME IN THE PAST 12 MONTHS, WERE YOU HOMELESS OR LIVING IN A SHELTER (INCLUDING NOW)?: NO

## 2024-10-21 SDOH — HEALTH STABILITY: MENTAL HEALTH: HOW OFTEN DO YOU HAVE A DRINK CONTAINING ALCOHOL?: MONTHLY OR LESS

## 2024-10-21 SDOH — SOCIAL STABILITY: SOCIAL INSECURITY: ABUSE: ADULT

## 2024-10-21 SDOH — SOCIAL STABILITY: SOCIAL INSECURITY: DO YOU FEEL UNSAFE GOING BACK TO THE PLACE WHERE YOU ARE LIVING?: NO

## 2024-10-21 SDOH — SOCIAL STABILITY: SOCIAL INSECURITY: DO YOU FEEL ANYONE HAS EXPLOITED OR TAKEN ADVANTAGE OF YOU FINANCIALLY OR OF YOUR PERSONAL PROPERTY?: NO

## 2024-10-21 SDOH — SOCIAL STABILITY: SOCIAL INSECURITY: HAS ANYONE EVER THREATENED TO HURT YOUR FAMILY OR YOUR PETS?: NO

## 2024-10-21 SDOH — SOCIAL STABILITY: SOCIAL INSECURITY: DOES ANYONE TRY TO KEEP YOU FROM HAVING/CONTACTING OTHER FRIENDS OR DOING THINGS OUTSIDE YOUR HOME?: NO

## 2024-10-21 ASSESSMENT — PAIN SCALES - GENERAL
PAINLEVEL_OUTOF10: 9
PAINLEVEL_OUTOF10: 8
PAINLEVEL_OUTOF10: 9
PAINLEVEL_OUTOF10: 7
PAINLEVEL_OUTOF10: 8
PAINLEVEL_OUTOF10: 10 - WORST POSSIBLE PAIN
PAINLEVEL_OUTOF10: 4
PAINLEVEL_OUTOF10: 5 - MODERATE PAIN
PAINLEVEL_OUTOF10: 8
PAINLEVEL_OUTOF10: 9
PAINLEVEL_OUTOF10: 7
PAINLEVEL_OUTOF10: 7
PAINLEVEL_OUTOF10: 10 - WORST POSSIBLE PAIN
PAINLEVEL_OUTOF10: 9
PAINLEVEL_OUTOF10: 9
PAINLEVEL_OUTOF10: 7

## 2024-10-21 ASSESSMENT — COGNITIVE AND FUNCTIONAL STATUS - GENERAL
DAILY ACTIVITIY SCORE: 24
PATIENT BASELINE BEDBOUND: NO
MOBILITY SCORE: 24
MOBILITY SCORE: 24
DAILY ACTIVITIY SCORE: 24

## 2024-10-21 ASSESSMENT — ACTIVITIES OF DAILY LIVING (ADL)
DRESSING YOURSELF: INDEPENDENT
GROOMING: INDEPENDENT
HEARING - RIGHT EAR: FUNCTIONAL
HEARING - LEFT EAR: FUNCTIONAL
PATIENT'S MEMORY ADEQUATE TO SAFELY COMPLETE DAILY ACTIVITIES?: NO
FEEDING YOURSELF: INDEPENDENT
JUDGMENT_ADEQUATE_SAFELY_COMPLETE_DAILY_ACTIVITIES: NO
LACK_OF_TRANSPORTATION: NO
LACK_OF_TRANSPORTATION: NO
WALKS IN HOME: INDEPENDENT
BATHING: INDEPENDENT
ADEQUATE_TO_COMPLETE_ADL: NO
TOILETING: INDEPENDENT

## 2024-10-21 ASSESSMENT — PAIN DESCRIPTION - LOCATION
LOCATION: NECK

## 2024-10-21 ASSESSMENT — COLUMBIA-SUICIDE SEVERITY RATING SCALE - C-SSRS
2. HAVE YOU ACTUALLY HAD ANY THOUGHTS OF KILLING YOURSELF?: NO
6. HAVE YOU EVER DONE ANYTHING, STARTED TO DO ANYTHING, OR PREPARED TO DO ANYTHING TO END YOUR LIFE?: NO
1. IN THE PAST MONTH, HAVE YOU WISHED YOU WERE DEAD OR WISHED YOU COULD GO TO SLEEP AND NOT WAKE UP?: NO

## 2024-10-21 ASSESSMENT — PAIN - FUNCTIONAL ASSESSMENT
PAIN_FUNCTIONAL_ASSESSMENT: 0-10
PAIN_FUNCTIONAL_ASSESSMENT: UNABLE TO SELF-REPORT

## 2024-10-21 ASSESSMENT — LIFESTYLE VARIABLES
HOW OFTEN DO YOU HAVE 6 OR MORE DRINKS ON ONE OCCASION: LESS THAN MONTHLY
HOW OFTEN DO YOU HAVE A DRINK CONTAINING ALCOHOL: MONTHLY OR LESS
SKIP TO QUESTIONS 9-10: 0
HOW MANY STANDARD DRINKS CONTAINING ALCOHOL DO YOU HAVE ON A TYPICAL DAY: 1 OR 2
AUDIT-C TOTAL SCORE: 2
SKIP TO QUESTIONS 9-10: 0
AUDIT-C TOTAL SCORE: 2
AUDIT-C TOTAL SCORE: 2

## 2024-10-21 ASSESSMENT — PATIENT HEALTH QUESTIONNAIRE - PHQ9
2. FEELING DOWN, DEPRESSED OR HOPELESS: NOT AT ALL
1. LITTLE INTEREST OR PLEASURE IN DOING THINGS: NOT AT ALL
SUM OF ALL RESPONSES TO PHQ9 QUESTIONS 1 & 2: 0

## 2024-10-21 ASSESSMENT — PAIN DESCRIPTION - ORIENTATION: ORIENTATION: LEFT

## 2024-10-21 NOTE — POST-PROCEDURE NOTE
Ms. Duncan is a 43 year old female who is POD #0 from a left thyroidectomy and parathyroid re-implantation (Intraoperative Findings: Large left thyroid gland, approximately 10 cm in largest dimension. Recurrent laryngeal nerve intact with approximate stimulation at the end of the case. Left superior parathyroid gland implanted into left sternocleidomastoid muscle). She does endorse some throat pain. Denies any nausea or vomiting. She has urinated since OR. She endorses some perioral numbness and minor upper lip swelling (Benadryl has been given).     PE:  Constitutional: A&Ox3, calm and cooperative, NAD.  Eyes: PERRL, clear sclera.  ENMT: Moist mucous membranes.  Head/Neck: Neck supple. Incision C/D/I with Steri-Strips. SHELLEY in place with serosanguinous output.  Cardiovascular: Normal rate and regular rhythm.   Respiratory/Thorax: Unlabored breathing. No stridor.    Genitourinary: Voiding independently without difficulty.  Musculoskeletal: ROM intact.  Neurological: A&Ox3, No focal deficits.  Psychological: Appropriate mood and behavior.  Skin: Warm and dry.    Radiology and labs reviewed. VSS, afebrile.    Plan:   - Diet: Regular  - Continue current pain regimen   - PRN antiemetic   - DVT Proph: SCDs/ ambulate/ Heparin  - Bowel regimen: Miralax  - Monitor VS every 4 hours   - Labs ordered for AM - PM Ca ordered as well. Will replete as needed.  - IS every hour while awake   - Encourage ambulation / OOB as tolerated   - Monitor for any peiroral and/or fingertip numbness/tingling  - Monitor for dyspnea/stridor  - SHELLEY drain care every shift and as needed. Monitor and record output.   - Monitor surgical site for drainage, erythema, excessive bruising, hematoma   - F/u with Dr. Gan outpatient once d/c from hospital     Dispo: Pain and nausea control as needed. OOB as able. Monitor and record SHELLEY drain output. Monitor surgical site and monitor for any s/sx of hypocalcemia or dyspnea/stridor.     Total time spent 25  minutes, and greater than 50% of time was spent in counseling/coordination of care.

## 2024-10-21 NOTE — DISCHARGE INSTRUCTIONS
[facility] DEPARTMENT OF OTOLARYNGOLOGY (ENT):  THYROIDECTOMY/PARATHYROIDECTOMY POST-OPERATIVE INSTRUCTIONS    Operations performed while hospitalized:   Left Thyroidectomy    Treatment/wound care:   Keep area(s) clean and dry.   It is okay to shower 48 hours after time of surgery.    Do not scrub wound(s), pat dry.    Do not submerge wound(s) in standing water until seen in followup (no tub bathing, swimming, or hot tubs).    Please visually inspect your wound(s) at least once daily.  If the wound(s) are in a difficult to see location, please use a mirror or have someone else assist with visual inspection.    If you have sutures that you can see outside of the skin or staples:  Please have staples/sutures removed in our clinic 10-14 days after the date of surgery.  Do not remove the staples/sutures on your own.  Return sooner or call if wound(s) or surrounding area have increased swelling, pain, warmth, redness, or drainage that is thick, yellow and/or green.    If you see white bandages on your neck:  You have steri strips (small paper tape) along your incision. You can shower, pat dry; do not soak in a tub.  The steri strips will fall off on their own; do not peel them off.    Expected Post-Surgical Symptoms:  You may experience neck pain and a hoarse/weak voice. These symptoms are often temporary and may be due to irritation from the breathing tube that's inserted during surgery.  Swallowing difficulties due to pain for several days.    Pain Control:    Adequate management: Alternate taking either acetaminophen or ibuprofen every 3 hours as needed for pain. For example, take acetaminophen at 9:00, followed by ibuprofen at 12:00, followed by acetaminophen at 3:00, etc.   Tramadol can be taken as prescribed as needed for breakthrough pain.      Activity after Discharge:   When you go home, you can usually return to your regular activities.  Avoid strenuous activities, such as bicycle riding, jogging,  weight lifting, or aerobic exercise, for at least 2 weeks.   No travelling for at least 7 days following surgery.  Do not drive or operate heavy machinery while taking narcotic pain medications as these medications can alter perception, impair judgement, and slow reaction times.    Diet: resume normal diet    Additional Instructions:   Medicines  DO NOT take blood thinners, such as warfarin (Coumadin), clopidogrel (Plavix), or aspirin until instructed to do so from your surgeon.   Take pain medicines exactly as directed.   If you are prescribed antibiotics, take them as directed. Do not stop taking them just because you feel better. You need to take the full course of antibiotics.    Signs & Symptoms of Low Calcium:  Tingling in your hands, feet, or lips   Muscle spasms, weakness, shaking, or loss of body control   Seizures   Slow or uneven heartbeat, lightheadedness   Anxiety, depression, anger, confusion, or seeing or hearing things that are not really there     If you experience any of these symptoms please take 2 extra strength Tums and contact your surgeon's office for further instructions.

## 2024-10-21 NOTE — OP NOTE
Left Thyroidectomy (L) Operative Note     Date: 10/21/2024  OR Location: [facility] OR    Name: Rdaha Duncan, : 1980, Age: 43 y.o., MRN: [id number], Sex: female    Diagnosis  Pre-op Diagnosis      * Nontoxic multinodular goiter [E04.2] Post-op Diagnosis     * Nontoxic multinodular goiter [E04.2]     Procedures  Left substernal thyroid transcervical    Parathyroid reimplantation    Surgeons     * Aris Gan - Primary    Resident/Fellow/Other Assistant:  Krystal Chau MD - Resident    Procedure Summary  Anesthesia: General  ASA: III  Anesthesia Staff: Anesthesiologist: Valentino Tony MD  CRNA: MAURICIO Rodriguez-CRNA  C-AA: BAILEY Butt  Estimated Blood Loss: 10 mL  Intra-op Medications: Administrations occurring from 1100 to 1430 on 10/21/24:  * No intraprocedure medications in log *    Specimen: No specimens collected     Staff:   Circulator: Tory  Scrub Person: Machelle  Circulator: Susanne Mak Scrub: Lily  Scrub Person: Femi    Drains and/or Catheters:   Closed/Suction Drain Neck Bulb 10 Fr. (Active)       Findings: Large left thyroid gland, approximately 10 cm in largest dimension. Recurrent laryngeal nerve intact with appropriate stimulation at the end of the case. Left superior parathyroid gland implanted into left sternocleidomastoid muscle.    Indications: Radha Duncan is an 43 y.o. female who is having surgery for Nontoxic multinodular goiter [E04.2]. Patient has a 10 cm left thyroid mass causing tracheal deviation, as well as concurrent thyroiditis.    The patient was seen in the preoperative area. The risks, benefits, complications, treatment options, non-operative alternatives, expected recovery and outcomes were discussed with the patient. The possibilities of reaction to medication, pulmonary aspiration, injury to surrounding structures, bleeding, recurrent infection, the need for additional procedures, failure to diagnose a condition, and creating a complication  requiring transfusion or operation were discussed with the patient. The patient concurred with the proposed plan, giving informed consent.  The site of surgery was properly noted/marked if necessary per policy. The patient has been actively warmed in preoperative area. Preoperative antibiotics are not indicated. Venous thrombosis prophylaxis have been ordered including bilateral sequential compression devices    Procedure Details:     Patient was seen and evaluated in the pre-op area. Informed consent was obtained as described above. Patient was then taken to the operating room by the anesthesia team. General anesthesia was induced and patient was orotracheally intubated using a Glidescope and a nerve monitoring tube. Appropriate position was confirmed by anesthesia and ENT. The nerve monitor was hooked up and confirmed to be working appropriately. This was used throughout the case to identify and ensure function of the bilateral recurrent laryngeal nerves. Pre-operative huddle was performed by Dr. Gan.    An 8-cm incision low in the midline neck was outlined, injected, and then prepped and draped in appropriate fashion with a shoulder roll in place. A #15 blade was used to incise the skin. Electrocautery was used to incise down to the anterior jugular veins, which were isolated, ligated, and divided. Suprastrap flaps were elevated superiorly and inferiorly. The strap muscles were then elevated off of the thyroid gland and divided. Retractors were placed. The thyroid capsule was followed closely and  from overlying fascia using a combination of blunt dissection, ligasure, and bipolar cautery.     The superior pole was identified, isolated, clipped, and cut. As we mobilized the superior pole further, it was retracted down and all the soft tissue down to the joint space was freed. We then identified the lateral aspect of the gland and the middle thyroid vein which was isolated, clipped, and cut. The  inferior pole vessels were isolated, clipped, and cut. The fascia was swept off the gland. The trachea had been identified. The inferior pole was mobilized off the trachea.     The recurrent nerve was encountered at this point, heading towards the joint space. The nerve was dissected out using a Luna. All the vessels lateral to the recurrent nerve were isolated, clipped, and cut. The parathyroids were released and protected. Roberts's ligament was released. The substernal component of the gland was delivered from the upper chest and the retropharyngeal location.The vessels medial to the nerve were isolated, clipped, and cut. The thyroid lobe was removed. It was oriented and sent to Pathology.     The superior parathyroid gland was noted to be devascularized and therefore was reimplanted into the sternocleidomastoid muscle. This was done by dividing the gland into small pieces to increase surface area, and placing this within a pocket created within the SCM. The muscle was closed with medium clips.    There were no complications noted. The nerve stimulated at the end of the case. Valsalva was performed and hemostasis was achieved. The strap muscles were reapproximated using horizontal mattress sutures with 3-0 vicryl. A single 3-0 vicryl stitch was placed in the straps in the midline. A 10 flat drain was placed within the left neck and secured to the skin using a bumper and 2-0 prolene. The platysma and deep dermal layers were reapproximated with interrupted deep 3-0 vicryl sutures and a 4-0 Monocryl was used for skin closure. Steri-Strips and mastisol were placed. Patient's care was then turned over to the anesthesia and was awakened, extubated and transported to the PACU in stable condition.    Dr. Gan was present and participated in all critical portions of the procedure.      Complications:  None; patient tolerated the procedure well.    Disposition: PACU - hemodynamically stable.  Condition: stable      Additional Details:     Attending Attestation:     Aris Gan  Phone Number: [phone]

## 2024-10-21 NOTE — ANESTHESIA PROCEDURE NOTES
Airway  Date/Time: 10/21/2024 10:57 AM  Urgency: elective    Airway not difficult    Staffing  Performed: CRNA   Authorized by: Valentino Tony MD    Performed by: MAURICIO Rodriguez-RICARDO  Patient location during procedure: OR    Indications and Patient Condition  Indications for airway management: anesthesia and airway protection  Spontaneous Ventilation: absent  Sedation level: deep  Preoxygenated: yes  Patient position: sniffing  Mask difficulty assessment: 1 - vent by mask    Final Airway Details  Final airway type: endotracheal airway      Successful airway: ETT  Cuffed: no   Successful intubation technique: direct laryngoscopy (elective)  Facilitating devices/methods: intubating stylet  Endotracheal tube insertion site: oral  Blade size: #3  ETT size (mm): 6.0 (NIMS ETT)  Cormack-Lehane Classification: grade I - full view of glottis  Placement verified by: chest auscultation, capnometry and palpation of cuff   Measured from: gums  Number of attempts at approach: 1  Number of other approaches attempted: 0

## 2024-10-22 ENCOUNTER — APPOINTMENT (OUTPATIENT)
Dept: OTOLARYNGOLOGY | Facility: CLINIC | Age: 44
End: 2024-10-22
Payer: COMMERCIAL

## 2024-10-22 VITALS
BODY MASS INDEX: 39.65 KG/M2 | SYSTOLIC BLOOD PRESSURE: 130 MMHG | RESPIRATION RATE: 18 BRPM | WEIGHT: 246.69 LBS | DIASTOLIC BLOOD PRESSURE: 83 MMHG | HEIGHT: 66 IN | TEMPERATURE: 97.5 F | HEART RATE: 82 BPM | OXYGEN SATURATION: 97 %

## 2024-10-22 LAB
ALBUMIN SERPL BCP-MCNC: 3.4 G/DL (ref 3.4–5)
ANION GAP SERPL CALC-SCNC: 13 MMOL/L (ref 10–20)
BUN SERPL-MCNC: 7 MG/DL (ref 6–23)
CALCIUM SERPL-MCNC: 8.6 MG/DL (ref 8.6–10.3)
CHLORIDE SERPL-SCNC: 103 MMOL/L (ref 98–107)
CO2 SERPL-SCNC: 24 MMOL/L (ref 21–32)
CREAT SERPL-MCNC: 0.7 MG/DL (ref 0.5–1.05)
EGFRCR SERPLBLD CKD-EPI 2021: >90 ML/MIN/1.73M*2
ERYTHROCYTE [DISTWIDTH] IN BLOOD BY AUTOMATED COUNT: 28.3 % (ref 11.5–14.5)
GLUCOSE SERPL-MCNC: 90 MG/DL (ref 74–99)
HCT VFR BLD AUTO: 32.9 % (ref 36–46)
HGB BLD-MCNC: 9.5 G/DL (ref 12–16)
MCH RBC QN AUTO: 23.3 PG (ref 26–34)
MCHC RBC AUTO-ENTMCNC: 28.9 G/DL (ref 32–36)
MCV RBC AUTO: 81 FL (ref 80–100)
NRBC BLD-RTO: 0 /100 WBCS (ref 0–0)
PHOSPHATE SERPL-MCNC: 3.4 MG/DL (ref 2.5–4.9)
PLATELET # BLD AUTO: 304 X10*3/UL (ref 150–450)
POTASSIUM SERPL-SCNC: 3.8 MMOL/L (ref 3.5–5.3)
RBC # BLD AUTO: 4.08 X10*6/UL (ref 4–5.2)
SODIUM SERPL-SCNC: 136 MMOL/L (ref 136–145)
WBC # BLD AUTO: 11.5 X10*3/UL (ref 4.4–11.3)

## 2024-10-22 PROCEDURE — 2500000004 HC RX 250 GENERAL PHARMACY W/ HCPCS (ALT 636 FOR OP/ED): Performed by: STUDENT IN AN ORGANIZED HEALTH CARE EDUCATION/TRAINING PROGRAM

## 2024-10-22 PROCEDURE — 7100000011 HC EXTENDED STAY RECOVERY HOURLY - NURSING UNIT

## 2024-10-22 PROCEDURE — 85027 COMPLETE CBC AUTOMATED: CPT

## 2024-10-22 PROCEDURE — 99238 HOSP IP/OBS DSCHRG MGMT 30/<: CPT

## 2024-10-22 PROCEDURE — 2500000001 HC RX 250 WO HCPCS SELF ADMINISTERED DRUGS (ALT 637 FOR MEDICARE OP): Performed by: STUDENT IN AN ORGANIZED HEALTH CARE EDUCATION/TRAINING PROGRAM

## 2024-10-22 PROCEDURE — 96372 THER/PROPH/DIAG INJ SC/IM: CPT | Performed by: STUDENT IN AN ORGANIZED HEALTH CARE EDUCATION/TRAINING PROGRAM

## 2024-10-22 PROCEDURE — 36415 COLL VENOUS BLD VENIPUNCTURE: CPT

## 2024-10-22 PROCEDURE — 80069 RENAL FUNCTION PANEL: CPT

## 2024-10-22 RX ORDER — ONDANSETRON 4 MG/1
4 TABLET, FILM COATED ORAL EVERY 6 HOURS PRN
Qty: 20 TABLET | Refills: 0 | Status: SHIPPED | OUTPATIENT
Start: 2024-10-22

## 2024-10-22 ASSESSMENT — PAIN SCALES - GENERAL
PAINLEVEL_OUTOF10: 8
PAINLEVEL_OUTOF10: 5 - MODERATE PAIN
PAINLEVEL_OUTOF10: 8
PAINLEVEL_OUTOF10: 0 - NO PAIN
PAINLEVEL_OUTOF10: 5 - MODERATE PAIN
PAINLEVEL_OUTOF10: 5 - MODERATE PAIN

## 2024-10-22 ASSESSMENT — COGNITIVE AND FUNCTIONAL STATUS - GENERAL
DAILY ACTIVITIY SCORE: 24
MOBILITY SCORE: 24

## 2024-10-22 ASSESSMENT — PAIN - FUNCTIONAL ASSESSMENT
PAIN_FUNCTIONAL_ASSESSMENT: 0-10

## 2024-10-22 ASSESSMENT — PAIN DESCRIPTION - ORIENTATION
ORIENTATION: LEFT
ORIENTATION: LEFT

## 2024-10-22 ASSESSMENT — PAIN DESCRIPTION - DESCRIPTORS: DESCRIPTORS: SORE

## 2024-10-22 ASSESSMENT — ACTIVITIES OF DAILY LIVING (ADL): LACK_OF_TRANSPORTATION: NO

## 2024-10-22 ASSESSMENT — PAIN DESCRIPTION - LOCATION
LOCATION: NECK
LOCATION: NECK

## 2024-10-22 NOTE — DISCHARGE SUMMARY
Discharge Diagnosis  Nontoxic multinodular goiter    Issues Requiring Follow-Up  Post op appointment     Test Results Pending At Discharge  Pending Labs       Order Current Status    CBC In process    Surgical Pathology Exam In process            Hospital Course  43 yr old male with nontoxic multinodular goiter s/p left substernal thyroidectomy and parathyroid reimplantation on 10/21/2024 with Dr. Gan. Please see operative report for full details. Patient tolerated the procedure well and recovered briefly in PACU before being transitioned to regular nursing floor. Post-op course was uncomplicated. Diet was advanced as tolerated and IV medication were transitioned to oral. On the day of discharge, the pt was tolerating a diet, pain was controlled on PO pain medication, and they were ambulating and voiding spontaneously. Her serum calcium was 8.6 on the morning of discharge and did not require any calcium replacements. Her SHELLEY drain was removed without difficulty or complications. Pt discharged home on 10/22/24 in stable condition with instructions to follow up as outpatient.       Pertinent Physical Exam At Time of Discharge  PE:  Constitutional: calm and cooperative, NAD  Eyes: PERRL, clear sclera   ENMT: Moist mucous membranes  Head/Neck: Anterior neck incision C/D/I with steri-strips- minor surrounding edema, no erythema or drainage. Edges well approximated. no hematoma or stridor noted. SHELLEY in place with serosanguinous output-- drain was removed by me without issues.  Cardiovascular: RRR. 2+ equal pulses of the distal extremities.  Respiratory/Thorax: CTAB. Good symmetric chest expansion. On RA  Genitourinary: Voiding independently   Musculoskeletal: ROM intact, no joint swelling, normal strength  Neurological: A&Ox3, No focal deficits   Psychological: Appropriate mood and behavior  Skin: Warm and dry      Home Medications     Medication List      START taking these medications     ibuprofen 400 mg tablet; Take  1 tablet (400 mg) by mouth every 6 hours   if needed for mild pain (1 - 3) or moderate pain (4 - 6) for up to 10   days.   ondansetron 4 mg tablet; Commonly known as: Zofran; Take 1 tablet (4 mg)   by mouth every 6 hours if needed for nausea or vomiting.     CHANGE how you take these medications     acetaminophen 325 mg tablet; Commonly known as: Tylenol; Take 2 tablets   (650 mg) by mouth every 6 hours if needed for mild pain (1 - 3) or   moderate pain (4 - 6) for up to 14 days.; What changed: how much to take,   when to take this, reasons to take this   traMADol 50 mg tablet; Commonly known as: Ultram; Take 1 tablet (50 mg)   by mouth every 8 hours if needed for severe pain (7 - 10) for up to 5   days.; What changed: how much to take, when to take this, Another   medication with the same name was removed. Continue taking this   medication, and follow the directions you see here.     CONTINUE taking these medications     co-enzyme Q-10 50 mg capsule   cyanocobalamin 250 mcg tablet; Commonly known as: Vitamin B-12   cyclobenzaprine 5 mg tablet; Commonly known as: Flexeril; Take 1 tablet   (5 mg) by mouth as needed at bedtime for muscle spasms (Trapezius   tightness/pain).   ferrous sulfate (325 mg ferrous sulfate) tablet; Take 1 tablet (325 mg)   by mouth once daily with breakfast.   FIBER ORAL   gabapentin 300 mg capsule; Commonly known as: Neurontin; Take 1 capsule   (300 mg) by mouth every 8 hours.   lactobacillus acidophilus tablet tablet; Take 1 tablet by mouth 2 times   a day.   lidocaine 4 % patch; Place 1 patch over 12 hours on the skin once daily.   Remove & discard patch within 12 hours or as directed by MD.   multivitamin tablet   naproxen 250 mg tablet; Commonly known as: Naprosyn; Take 1 tablet (250   mg) by mouth 2 times daily (morning and late afternoon).   VITAMIN D3 ORAL     STOP taking these medications     predniSONE 10 mg tablet; Commonly known as: Deltasone     ASK your doctor about these  medications     diclofenac sodium 1 % gel; Commonly known as: Voltaren; Apply 4.5 inches   (4 g) topically 4 times a day as needed (neck pain).   fluticasone 50 mcg/actuation nasal spray; Commonly known as: Flonase       Outpatient Follow-Up  Future Appointments   Date Time Provider Department Center   11/1/2024 12:00 PM Aris Gan MD [id number] [facility]   11/20/2024 11:15 AM Emilee Fairbakns MD [id number] [facility]   12/12/2024  1:30 PM Eleni Mark PA-C [id number] [facility]       Aida Mathew, APRN-CNP

## 2024-10-22 NOTE — PROGRESS NOTES
10/22/24 0934   Discharge Planning   Living Arrangements Spouse/significant other   Support Systems Spouse/significant other   Assistance Needed Independent prior to admission   Type of Residence Private residence   Number of Stairs to Enter Residence 5   Number of Stairs Within Residence 0   Do you have animals or pets at home? No   Who is requesting discharge planning? Other (Comment)  (TCC assessment)   Home or Post Acute Services None   Expected Discharge Disposition Home   Does the patient need discharge transport arranged? No   Financial Resource Strain   How hard is it for you to pay for the very basics like food, housing, medical care, and heating? Not hard   Housing Stability   In the last 12 months, was there a time when you were not able to pay the mortgage or rent on time? N   In the past 12 months, how many times have you moved where you were living? 0   At any time in the past 12 months, were you homeless or living in a shelter (including now)? N   Transportation Needs   In the past 12 months, has lack of transportation kept you from medical appointments or from getting medications? no   In the past 12 months, has lack of transportation kept you from meetings, work, or from getting things needed for daily living? No     Patient is POD 1 from a lefty thyroidectomy.  SHELLEY drain removed this morning.  She will be discharged home with no homecare needs today.  Sandra Ferrera RN

## 2024-10-22 NOTE — PROGRESS NOTES
10/22/24 0936   [facility] Disability Status   Are you deaf or do you have serious difficulty hearing? N   Are you blind or do you have serious difficulty seeing, even when wearing glasses? N   Because of a physical, mental, or emotional condition, do you have serious difficulty concentrating, remembering, or making decisions? (5 years old or older) N   Do you have serious difficulty walking or climbing stairs? N   Do you have serious difficulty dressing or bathing? N   Because of a physical, mental, or emotional condition, do you have serious difficulty doing errands alone such as visiting the doctor? N

## 2024-10-22 NOTE — CARE PLAN
The patient's goals for the shift include      The clinical goals for the shift include pain managed through shift    Over the shift, the patient did make progress toward the goal.

## 2024-10-22 NOTE — ANESTHESIA POSTPROCEDURE EVALUATION
Patient: Radha Duncan    Procedure Summary       Date: 10/21/24 Room / Location: [facility] A OR 09 / Virtual U A OR    Anesthesia Start: 1042 Anesthesia Stop: 1428    Procedure: Left  Substernal Thyroidectomy: parathyroid reimplantation (Left: Neck) Diagnosis:       Nontoxic multinodular goiter      (Nontoxic multinodular goiter [E04.2])    Surgeons: Aris Gan MD Responsible Provider: Valentino Tony MD    Anesthesia Type: general ASA Status: 3            Anesthesia Type: general    Vitals Value Taken Time   /69 10/21/24 1609   Temp 36.4 °C (97.5 °F) 10/21/24 1530   Pulse 90 10/21/24 1610   Resp 18 10/21/24 1600   SpO2 98 % 10/21/24 1611   Vitals shown include unfiled device data.    Anesthesia Post Evaluation    Patient location during evaluation: PACU  Patient participation: complete - patient participated  Level of consciousness: awake and alert  Pain management: adequate  Airway patency: patent  Cardiovascular status: acceptable and hemodynamically stable  Respiratory status: acceptable, spontaneous ventilation and nonlabored ventilation  Hydration status: acceptable  Postoperative Nausea and Vomiting: none        No notable events documented.

## 2024-10-22 NOTE — CARE PLAN
The patient's goals for the shift include      The clinical goals for the shift include Maintain patient's safety and manage patient's pain throughout the shift    Over the shift, the patient did not make progress toward the following goals. Barriers to progression include . Recommendations to address these barriers include   Problem: Pain - Adult  Goal: Verbalizes/displays adequate comfort level or baseline comfort level  Outcome: Progressing     Problem: Safety - Adult  Goal: Free from fall injury  Outcome: Progressing     Problem: Discharge Planning  Goal: Discharge to home or other facility with appropriate resources  Outcome: Progressing     Problem: Chronic Conditions and Co-morbidities  Goal: Patient's chronic conditions and co-morbidity symptoms are monitored and maintained or improved  Outcome: Progressing   .

## 2024-10-28 DIAGNOSIS — G89.18 POST-OP PAIN: ICD-10-CM

## 2024-10-28 DIAGNOSIS — E04.2 NONTOXIC MULTINODULAR GOITER: ICD-10-CM

## 2024-10-28 RX ORDER — TRAMADOL HYDROCHLORIDE 50 MG/1
100 TABLET ORAL EVERY 6 HOURS PRN
Qty: 56 TABLET | Refills: 0 | Status: SHIPPED | OUTPATIENT
Start: 2024-10-28 | End: 2024-11-04

## 2024-10-28 RX ORDER — TRAMADOL HYDROCHLORIDE 50 MG/1
100 TABLET ORAL EVERY 6 HOURS PRN
Qty: 56 TABLET | Refills: 0 | Status: CANCELLED | OUTPATIENT
Start: 2024-10-28 | End: 2024-11-04

## 2024-11-01 ENCOUNTER — APPOINTMENT (OUTPATIENT)
Dept: OTOLARYNGOLOGY | Facility: CLINIC | Age: 44
End: 2024-11-01
Payer: COMMERCIAL

## 2024-11-01 VITALS — BODY MASS INDEX: 38.99 KG/M2 | WEIGHT: 248.4 LBS | HEIGHT: 67 IN

## 2024-11-01 DIAGNOSIS — M54.2 NECK PAIN: ICD-10-CM

## 2024-11-01 DIAGNOSIS — E04.1 THYROID NODULE: Primary | ICD-10-CM

## 2024-11-01 DIAGNOSIS — R22.1 NECK MASS: ICD-10-CM

## 2024-11-01 LAB
LABORATORY COMMENT REPORT: NORMAL
Lab: NORMAL
PATH REPORT.FINAL DX SPEC: NORMAL
PATH REPORT.GROSS SPEC: NORMAL
PATH REPORT.RELEVANT HX SPEC: NORMAL
PATH REPORT.TOTAL CANCER: NORMAL

## 2024-11-01 PROCEDURE — 1036F TOBACCO NON-USER: CPT | Performed by: OTOLARYNGOLOGY

## 2024-11-01 PROCEDURE — 99024 POSTOP FOLLOW-UP VISIT: CPT | Performed by: OTOLARYNGOLOGY

## 2024-11-01 PROCEDURE — 3008F BODY MASS INDEX DOCD: CPT | Performed by: OTOLARYNGOLOGY

## 2024-11-01 RX ORDER — GABAPENTIN 300 MG/1
300 CAPSULE ORAL EVERY 8 HOURS SCHEDULED
Qty: 15 CAPSULE | Refills: 0 | Status: SHIPPED | OUTPATIENT
Start: 2024-11-01 | End: 2024-11-06

## 2024-11-01 RX ORDER — CYCLOBENZAPRINE HCL 5 MG
5 TABLET ORAL 3 TIMES DAILY
Qty: 9 TABLET | Refills: 0 | Status: SHIPPED | OUTPATIENT
Start: 2024-11-01 | End: 2024-11-04

## 2024-11-05 ENCOUNTER — TELEPHONE (OUTPATIENT)
Dept: OTOLARYNGOLOGY | Facility: HOSPITAL | Age: 44
End: 2024-11-05
Payer: COMMERCIAL

## 2024-11-05 NOTE — TELEPHONE ENCOUNTER
Spoke with patient, informed of benign path    She will see me in 3 months      Will also see dr bryant in follow from the hospital admission as she has questions regarding future management

## 2024-11-20 ENCOUNTER — APPOINTMENT (OUTPATIENT)
Dept: OBSTETRICS AND GYNECOLOGY | Facility: CLINIC | Age: 44
End: 2024-11-20
Payer: COMMERCIAL

## 2024-12-03 ENCOUNTER — PATIENT MESSAGE (OUTPATIENT)
Dept: OTOLARYNGOLOGY | Facility: CLINIC | Age: 44
End: 2024-12-03
Payer: COMMERCIAL

## 2024-12-06 ENCOUNTER — APPOINTMENT (OUTPATIENT)
Dept: OBSTETRICS AND GYNECOLOGY | Facility: CLINIC | Age: 44
End: 2024-12-06
Payer: COMMERCIAL

## 2024-12-06 ENCOUNTER — OFFICE VISIT (OUTPATIENT)
Dept: OTOLARYNGOLOGY | Facility: CLINIC | Age: 44
End: 2024-12-06
Payer: COMMERCIAL

## 2024-12-06 VITALS — HEIGHT: 67 IN | BODY MASS INDEX: 37.67 KG/M2 | WEIGHT: 240 LBS

## 2024-12-06 DIAGNOSIS — M43.6 NECK STIFFNESS: ICD-10-CM

## 2024-12-06 DIAGNOSIS — E04.2 NONTOXIC MULTINODULAR GOITER: Primary | ICD-10-CM

## 2024-12-06 PROCEDURE — 99024 POSTOP FOLLOW-UP VISIT: CPT | Performed by: OTOLARYNGOLOGY

## 2024-12-06 PROCEDURE — 1036F TOBACCO NON-USER: CPT | Performed by: OTOLARYNGOLOGY

## 2024-12-06 PROCEDURE — 3008F BODY MASS INDEX DOCD: CPT | Performed by: OTOLARYNGOLOGY

## 2024-12-06 NOTE — PROGRESS NOTES
Left sided goite 10/24      Doing ok    Swallowing is different    Sneezing hurts    Neck is still stiff        Voice is ok      Seeing isela in January    Perhaps a little more tired than before            Incision ok, muscles tight and sore      Stable post op      Will see dr RIVERA in January    Can consider PT,     Check TSH    Follow up 4 months

## 2024-12-12 ENCOUNTER — APPOINTMENT (OUTPATIENT)
Dept: PLASTIC SURGERY | Facility: CLINIC | Age: 44
End: 2024-12-12
Payer: COMMERCIAL

## 2024-12-23 ENCOUNTER — APPOINTMENT (OUTPATIENT)
Dept: OBSTETRICS AND GYNECOLOGY | Facility: CLINIC | Age: 44
End: 2024-12-23
Payer: COMMERCIAL

## 2024-12-28 ENCOUNTER — OFFICE VISIT (OUTPATIENT)
Dept: URGENT CARE | Age: 44
End: 2024-12-28
Payer: COMMERCIAL

## 2024-12-28 VITALS
DIASTOLIC BLOOD PRESSURE: 91 MMHG | SYSTOLIC BLOOD PRESSURE: 137 MMHG | HEART RATE: 79 BPM | TEMPERATURE: 98 F | BODY MASS INDEX: 40.94 KG/M2 | OXYGEN SATURATION: 98 % | WEIGHT: 258 LBS | RESPIRATION RATE: 20 BRPM

## 2024-12-28 DIAGNOSIS — J06.9 VIRAL URI WITH COUGH: Primary | ICD-10-CM

## 2024-12-28 DIAGNOSIS — J02.9 SORE THROAT: ICD-10-CM

## 2024-12-28 LAB
POC RAPID INFLUENZA A: NEGATIVE
POC RAPID INFLUENZA B: NEGATIVE
POC RAPID STREP: NEGATIVE
POC SARS-COV-2 AG BINAX: NORMAL

## 2024-12-28 RX ORDER — BENZONATATE 200 MG/1
200 CAPSULE ORAL 3 TIMES DAILY PRN
Qty: 20 CAPSULE | Refills: 0 | Status: SHIPPED | OUTPATIENT
Start: 2024-12-28 | End: 2025-01-04

## 2024-12-28 NOTE — PROGRESS NOTES
Subjective   Patient ID: Radha Duncan is a 44 y.o. female. They present today with a chief complaint of Cough and Sore Throat (3 days ).    History of Present Illness  Patient reports ~3 days of cough and sore throat  Notes body aches  Reports bilateral ear pain  Denies fever  Endorses productive cough  Reports drainage   Reports hx of goiter with complications requiring surgery  Notes that she has an upcoming appointment for her thyroid with endocrinology      Past Medical History  Allergies as of 12/28/2024 - Reviewed 12/28/2024   Allergen Reaction Noted    Penicillins Hives and Swelling 08/09/2024       (Not in a hospital admission)       Past Medical History:   Diagnosis Date    Anemia     heavy peroids, h&H 7.8/28.7 9/3 transfused one units PRBC    Fibroids     IUD (intrauterine device) in place     Thyroid goiter     Thyroiditis        Past Surgical History:   Procedure Laterality Date    INTRAUTERINE DEVICE INSERTION      THYROID SURGERY Left     biopsy 9/24/24        reports that she has never smoked. She has never used smokeless tobacco. She reports that she does not currently use alcohol. She reports that she does not use drugs.                               Objective    Vitals:    12/28/24 0900   BP: (!) 137/91   Pulse: 79   Resp: 20   Temp: 36.7 °C (98 °F)   SpO2: 98%   Weight: 117 kg (258 lb)     Patient's last menstrual period was 12/28/2024.    Physical Exam  Constitutional:       General: She is not in acute distress.     Appearance: Normal appearance. She is not toxic-appearing or diaphoretic.   HENT:      Head: Normocephalic.      Right Ear: Tympanic membrane, ear canal and external ear normal. There is no impacted cerumen.      Left Ear: Tympanic membrane, ear canal and external ear normal. There is no impacted cerumen.      Nose: No rhinorrhea.      Mouth/Throat:      Pharynx: No oropharyngeal exudate or posterior oropharyngeal erythema.   Eyes:      General: No scleral icterus.         Right eye: No discharge.         Left eye: No discharge.      Extraocular Movements: Extraocular movements intact.   Cardiovascular:      Rate and Rhythm: Normal rate and regular rhythm.   Pulmonary:      Effort: Pulmonary effort is normal. No respiratory distress.      Breath sounds: No wheezing or rales.   Musculoskeletal:      Cervical back: Normal range of motion.   Neurological:      Mental Status: She is alert.   Psychiatric:         Mood and Affect: Mood normal.         Behavior: Behavior normal.         Thought Content: Thought content normal.      Comments: Pleasant         Procedures    Point of Care Test & Imaging Results from this visit:  Results for orders placed or performed in visit on 12/28/24   POCT Influenza A/B manually resulted    Collection Time: 12/28/24  9:09 AM   Result Value Ref Range    POC Rapid Influenza A Negative Negative    POC Rapid Influenza B Negative Negative   POCT rapid strep A manually resulted    Collection Time: 12/28/24  9:09 AM   Result Value Ref Range    POC Rapid Strep Negative Negative   POCT Covid-19 Rapid Antigen    Collection Time: 12/28/24  9:09 AM   Result Value Ref Range    POC VAMSI-COV-2 AG  Presumptive negative test for SARS-CoV-2 (no antigen detected)     Presumptive negative test for SARS-CoV-2 (no antigen detected)       Diagnostic study results (if any) were reviewed by Skyler Egan MD.    Assessment/Plan   Allergies, medications, history, and pertinent labs/EKGs/Imaging reviewed by Skyler Egan MD.     Medical Decision Making:    Patient's symptoms are consistent with URI likely 2/2 cold virus etiology. POC COVID, FLU, strep area negative. AFVSS satting 98% on RA.  Encourage supportive care measures. Return as needed. ER if symptoms worsen     Orders and Diagnoses  Diagnoses and all orders for this visit:  Viral URI with cough  -     POCT Influenza A/B manually resulted  -     POCT Covid-19 Rapid Antigen  -     benzonatate (Tessalon) 200 mg capsule;  Take 1 capsule (200 mg) by mouth 3 times a day as needed for cough for up to 7 days. Do not crush or chew.  Sore throat  -     POCT rapid strep A manually resulted      Patient disposition: Home      Medical Admin Record      Follow Up Instructions  No follow-ups on file.    Electronically signed by Skyler Egan MD  9:29 AM

## 2025-03-19 NOTE — ANESTHESIA PREPROCEDURE EVALUATION
Patient: Radha Duncan    Procedure Information       Date/Time: 10/21/24 1100    Procedure: Left Thyroidectomy (Left)    Location: [facility] OR 09 / Virtual [facility] OR    Surgeons: Aris Gan MD            Relevant Problems   Endocrine   (+) Goiter   (+) Nontoxic multinodular goiter       Clinical information reviewed:   Tobacco  Allergies  Meds   Med Hx  Surg Hx   Fam Hx  Soc Hx         Past Medical History:   Diagnosis Date    Anemia     heavy peroids, h&H 7.8/28.7 9/3 transfused one units PRBC    Fibroids     IUD (intrauterine device) in place     Thyroid goiter     Thyroiditis       Past Surgical History:   Procedure Laterality Date    INTRAUTERINE DEVICE INSERTION      THYROID SURGERY Left     biopsy 9/24/24     Social History     Tobacco Use    Smoking status: Never    Smokeless tobacco: Never   Vaping Use    Vaping status: Never Used   Substance Use Topics    Alcohol use: Not Currently     Comment: 2/month    Drug use: Never      Current Outpatient Medications   Medication Instructions    acetaminophen (TYLENOL) 975 mg, oral, 3 times daily    cholecalciferol, vitamin D3, (VITAMIN D3 ORAL) 1 tablet, Daily    co-enzyme Q-10 50 mg, Daily    cyanocobalamin (VITAMIN B-12) 500 mcg, Daily    cyclobenzaprine (FLEXERIL) 5 mg, oral, Nightly PRN    diclofenac sodium (VOLTAREN) 4 g, Topical, 4 times daily PRN    ferrous sulfate (325 mg ferrous sulfate) 325 mg, oral, Daily with breakfast    fluticasone (Flonase) 50 mcg/actuation nasal spray 1 spray, Daily    gabapentin (NEURONTIN) 300 mg, oral, Every 8 hours scheduled    lactobacillus acidophilus tablet tablet 1 tablet, oral, 2 times daily    lidocaine 4 % patch 1 patch, transdermal, Daily, Remove & discard patch within 12 hours or as directed by MD.    multivitamin tablet 1 tablet, Daily    naproxen (NAPROSYN) 250 mg, oral, 2 times daily (morning and late afternoon)    predniSONE (Deltasone) 10 mg tablet Take 4 tabs (40 mg) daily for 3 days, then take 3 tabs  "(30 mg) daily for 3 days. Continue to decrease by 1 tab (10mg) every 3 days until gone.    psyllium seed, with sugar, (FIBER ORAL) 1 Dose, Daily PRN    traMADol (ULTRAM) 100 mg, oral, Every 6 hours PRN      Allergies   Allergen Reactions    Penicillins Hives and Swelling     Eye swelling        Chemistry    Lab Results   Component Value Date/Time     09/25/2024 0937    K 5.0 09/25/2024 0937     09/25/2024 0937    CO2 32 09/25/2024 0937    BUN 14 09/25/2024 0937    CREATININE 0.75 09/25/2024 0937    Lab Results   Component Value Date/Time    CALCIUM 9.1 09/25/2024 0937    ALKPHOS 49 09/25/2024 0937    AST 7 (L) 09/25/2024 0937    ALT 9 09/25/2024 0937    BILITOT 0.3 09/25/2024 0937          Lab Results   Component Value Date    HGBA1C 5.3 09/03/2024     Lab Results   Component Value Date/Time    WBC 10.1 10/11/2024 0819    HGB 9.0 (L) 10/11/2024 0819    HCT 31.7 (L) 10/11/2024 0819     (H) 10/11/2024 0819     No results found for: \"PROTIME\", \"PTT\", \"INR\"  No results found for: \"ABORH\"  Encounter Date: 09/16/24   ECG 12 lead   Result Value    Ventricular Rate 109    Atrial Rate 109    GA Interval 124    QRS Duration 78    QT Interval 314    QTC Calculation(Bazett) 422    P Axis 55    R Axis 22    T Axis 37    QRS Count 18    Q Onset 222    P Onset 160    P Offset 213    T Offset 379    QTC Fredericia 383    Narrative    Sinus tachycardia  Possible Left atrial enlargement  Cannot rule out Anterior infarct , age undetermined  Abnormal ECG  No previous ECGs available    See ED provider note for full interpretation and clinical correlation  Confirmed by Emerald Ornelas ([id number]) on 9/17/2024 5:57:53 AM     No results found for this or any previous visit from the past 1095 days.       Visit Vitals  /89   Temp 36.2 °C (97.2 °F)   Resp (!) 92   Ht 1.676 m (5' 6\")   Wt 112 kg (246 lb 11.1 oz)   SpO2 98%   BMI 39.82 kg/m²   OB Status Having periods   Smoking Status Never   BSA 2.28 m²     NPO/Void " Status  Carbohydrate Drink Given Prior to Surgery? : N  Date of Last Liquid: 10/21/24  Time of Last Liquid: 0700  Date of Last Solid: 10/20/24  Time of Last Solid: 1930  Last Intake Type: Clear fluids  Time of Last Void: 0948        Physical Exam    Airway  Mallampati: III  TM distance: >3 FB  Neck ROM: full     Cardiovascular - normal exam  Rhythm: regular  Rate: normal     Dental    Pulmonary - normal exam     Abdominal - normal exam             Anesthesia Plan    History of general anesthesia?: yes  History of complications of general anesthesia?: no    ASA 3     general   (General with ETT. Standard ASA monitoring)  intravenous induction   Postoperative administration of opioids is intended.  Anesthetic plan and risks discussed with patient.    Plan discussed with CAA and CRNA.         no

## 2025-03-27 ENCOUNTER — APPOINTMENT (OUTPATIENT)
Dept: PLASTIC SURGERY | Facility: CLINIC | Age: 45
End: 2025-03-27
Payer: COMMERCIAL

## 2025-04-07 ENCOUNTER — APPOINTMENT (OUTPATIENT)
Dept: ENDOCRINOLOGY | Facility: CLINIC | Age: 45
End: 2025-04-07
Payer: COMMERCIAL

## 2025-04-07 VITALS
HEIGHT: 67 IN | WEIGHT: 255 LBS | DIASTOLIC BLOOD PRESSURE: 52 MMHG | SYSTOLIC BLOOD PRESSURE: 104 MMHG | HEART RATE: 72 BPM | BODY MASS INDEX: 40.02 KG/M2

## 2025-04-07 DIAGNOSIS — E89.0 H/O PARTIAL THYROIDECTOMY: Primary | ICD-10-CM

## 2025-04-07 DIAGNOSIS — Z98.890 HISTORY OF PARATHYROID SURGERY: ICD-10-CM

## 2025-04-07 PROCEDURE — 99214 OFFICE O/P EST MOD 30 MIN: CPT | Performed by: STUDENT IN AN ORGANIZED HEALTH CARE EDUCATION/TRAINING PROGRAM

## 2025-04-07 PROCEDURE — 1036F TOBACCO NON-USER: CPT | Performed by: STUDENT IN AN ORGANIZED HEALTH CARE EDUCATION/TRAINING PROGRAM

## 2025-04-07 PROCEDURE — 3008F BODY MASS INDEX DOCD: CPT | Performed by: STUDENT IN AN ORGANIZED HEALTH CARE EDUCATION/TRAINING PROGRAM

## 2025-04-07 NOTE — PROGRESS NOTES
44 F  Coming in today for thyroid evaluation     Patient was initially seen inpatient by Endocrinology    She presented with a large left sided thyroid mass of at least 8cm  in size back in the fall of 2024.  This was found after a bout of sinusitis that prompted her to seek care.  She was found to have a goiter on exam that was confirmed with imaging.   There was a dominant 8cm left sided nodule.  About 1-2 months later she presented to the hospital with painful thyroid with some component of enlargement so she underwent s/p left substernal thyroidectomy and parathyroid reimplantation on 10/21/2024     Final pathology is benign   No post op hypocalcemia  Currently not on replacement     GYN:   Regular periods   Copper IUD     Supplements:   MVI with vitamin D complex     Famhx-no thyroid history in     Social History     Socioeconomic History    Marital status:      Spouse name: Not on file    Number of children: Not on file    Years of education: Not on file    Highest education level: Not on file   Occupational History    Occupation: Human resources   Tobacco Use    Smoking status: Never     Passive exposure: Past    Smokeless tobacco: Never   Vaping Use    Vaping status: Never Used   Substance and Sexual Activity    Alcohol use: Not Currently     Comment: 2/month    Drug use: Never    Sexual activity: Yes     Birth control/protection: I.U.D.   Other Topics Concern    Not on file   Social History Narrative    Not on file     Social Drivers of Health     Financial Resource Strain: Low Risk  (10/22/2024)    Overall Financial Resource Strain (CARDIA)     Difficulty of Paying Living Expenses: Not hard at all   Food Insecurity: No Food Insecurity (10/21/2024)    Hunger Vital Sign     Worried About Running Out of Food in the Last Year: Never true     Ran Out of Food in the Last Year: Never true   Transportation Needs: No Transportation Needs (10/22/2024)    PRAPARE - Transportation     Lack of Transportation  (Medical): No     Lack of Transportation (Non-Medical): No   Physical Activity: Not on file   Stress: No Stress Concern Present (10/21/2024)    [place] [employer] - Occupational Stress Questionnaire     Feeling of Stress : Not at all   Social Connections: Not on file   Intimate Partner Violence: Not At Risk (10/21/2024)    Humiliation, Afraid, Rape, and Kick questionnaire     Fear of Current or Ex-Partner: No     Emotionally Abused: No     Physically Abused: No     Sexually Abused: No   Housing Stability: Low Risk  (10/22/2024)    Housing Stability Vital Sign     Unable to Pay for Housing in the Last Year: No     Number of Times Moved in the Last Year: 0     Homeless in the Last Year: No     Physical Exam  Constitutional:       Appearance: Normal appearance.   Neck:      Comments: Thyroidectomy scar, no right sided goiter  Cardiovascular:      Rate and Rhythm: Normal rate and regular rhythm.   Musculoskeletal:         General: No swelling.   Skin:     General: Skin is warm.   Neurological:      Mental Status: She is alert.   Psychiatric:         Mood and Affect: Mood normal.         Behavior: Behavior normal.         labs and imaging reviewed, pertinent findings listed on HPI and Impression      Problem List Items Addressed This Visit       H/O partial thyroidectomy - Primary    Relevant Orders    Thyroid Stimulating Hormone    Thyroxine, Free    History of parathyroid surgery    Relevant Orders    Parathyroid Hormone, Intact    Vitamin D 25-Hydroxy,Total (for eval of Vitamin D levels)    Renal Function Panel       Post left sided hemithryoidectomy with parathyroid re implantation    -check TFT, calcium and PTH     Discussed possibility of levothyroxine replacement and proper intake of medication     No need for right sided ultrasound since there are no nodules on the previous imaging    Hospital records reviewed     Follow up in 6 months

## 2025-04-07 NOTE — PATIENT INSTRUCTIONS
-get your labs done     You need about 1000mg of calcium daily   Vitamin D 5782-9732 units daily     Based on the labs I will let you know if you need to be on thyroid medication     Levothyroxine medicine will need to be taken for the rest of your life. Do not stop taking this medicine or change your dose without first checking with your doctor. It may take several weeks before you start to notice that your symptoms are better.    Swallow the capsule whole. Do not cut or crush it.    It is best to take this medicine on an empty stomach. Take it with a full glass of water at least 30 minutes to 1 hour before eating breakfast.    This medicine should be taken at least 4 hours before or 4 hours after these medicines: antacids (Maalox®, Mylanta®, Tums®), calcium supplements, stomach medicines (including lansoprazole, omeprazole, pantoprazole, Aciphex®, Dexilant®, Nexium®, Prevacid®, Prilosec®), cholestyramine (Prevalite®, Questran®), colesevelam (Welchol®), colestipol (Colestid®), iron supplements, Kayexalate®, orlistat (Nakul®, Xenical®), simethicone (Gas-X®, Mylicon®), and sucralfate (Carafate®).    Cotton seed meal, dietary fiber, soybean flour (infant formula), or walnuts may affect the absorption of this medicine from your body. You may have to take this medicine at a different time of day from when you eat these foods. Talk with your doctor more about this if you have concerns.    Do not eat grapefruit or drink grapefruit juice while you are using this medicine    Follow up in 6 months    Orquidea Appiah MD  Divison of Endocrinology   [facility]   Phone: [phone]    option 4, then option 1  Fax: [phone]

## 2025-04-23 ENCOUNTER — APPOINTMENT (OUTPATIENT)
Dept: RADIOLOGY | Facility: HOSPITAL | Age: 45
End: 2025-04-23
Payer: COMMERCIAL

## 2025-04-23 ENCOUNTER — HOSPITAL ENCOUNTER (EMERGENCY)
Facility: HOSPITAL | Age: 45
Discharge: HOME | End: 2025-04-23
Payer: COMMERCIAL

## 2025-04-23 VITALS
RESPIRATION RATE: 13 BRPM | TEMPERATURE: 98.8 F | OXYGEN SATURATION: 98 % | HEIGHT: 67 IN | DIASTOLIC BLOOD PRESSURE: 71 MMHG | BODY MASS INDEX: 40.02 KG/M2 | SYSTOLIC BLOOD PRESSURE: 116 MMHG | HEART RATE: 101 BPM | WEIGHT: 255 LBS

## 2025-04-23 DIAGNOSIS — R10.30 LOWER ABDOMINAL PAIN: Primary | ICD-10-CM

## 2025-04-23 DIAGNOSIS — N30.90 CYSTITIS: ICD-10-CM

## 2025-04-23 DIAGNOSIS — K76.9 LIVER LESION: ICD-10-CM

## 2025-04-23 DIAGNOSIS — D21.9 FIBROIDS: ICD-10-CM

## 2025-04-23 LAB
ALBUMIN SERPL BCP-MCNC: 4.1 G/DL (ref 3.4–5)
ALP SERPL-CCNC: 42 U/L (ref 33–110)
ALT SERPL W P-5'-P-CCNC: 9 U/L (ref 7–45)
ANION GAP SERPL CALC-SCNC: 10 MMOL/L (ref 10–20)
APPEARANCE UR: CLEAR
AST SERPL W P-5'-P-CCNC: 15 U/L (ref 9–39)
B-HCG SERPL-ACNC: <2 MIU/ML
BASOPHILS # BLD AUTO: 0.02 X10*3/UL (ref 0–0.1)
BASOPHILS NFR BLD AUTO: 0.2 %
BILIRUB SERPL-MCNC: 0.6 MG/DL (ref 0–1.2)
BILIRUB UR STRIP.AUTO-MCNC: NEGATIVE MG/DL
BUN SERPL-MCNC: 14 MG/DL (ref 6–23)
CALCIUM SERPL-MCNC: 8.9 MG/DL (ref 8.6–10.3)
CANCER AG125 SERPL-ACNC: 17.5 U/ML (ref 0–30.2)
CANCER AG19-9 SERPL-ACNC: <4 U/ML
CHLORIDE SERPL-SCNC: 105 MMOL/L (ref 98–107)
CO2 SERPL-SCNC: 26 MMOL/L (ref 21–32)
COLOR UR: ABNORMAL
CREAT SERPL-MCNC: 0.76 MG/DL (ref 0.5–1.05)
EGFRCR SERPLBLD CKD-EPI 2021: >90 ML/MIN/1.73M*2
EOSINOPHIL # BLD AUTO: 0.06 X10*3/UL (ref 0–0.7)
EOSINOPHIL NFR BLD AUTO: 0.5 %
ERYTHROCYTE [DISTWIDTH] IN BLOOD BY AUTOMATED COUNT: 15.8 % (ref 11.5–14.5)
GLUCOSE SERPL-MCNC: 98 MG/DL (ref 74–99)
GLUCOSE UR STRIP.AUTO-MCNC: NORMAL MG/DL
HCT VFR BLD AUTO: 36.7 % (ref 36–46)
HGB BLD-MCNC: 11.1 G/DL (ref 12–16)
IMM GRANULOCYTES # BLD AUTO: 0.04 X10*3/UL (ref 0–0.7)
IMM GRANULOCYTES NFR BLD AUTO: 0.4 % (ref 0–0.9)
KETONES UR STRIP.AUTO-MCNC: NEGATIVE MG/DL
LACTATE SERPL-SCNC: 1.2 MMOL/L (ref 0.4–2)
LEUKOCYTE ESTERASE UR QL STRIP.AUTO: ABNORMAL
LIPASE SERPL-CCNC: 23 U/L (ref 9–82)
LYMPHOCYTES # BLD AUTO: 1.17 X10*3/UL (ref 1.2–4.8)
LYMPHOCYTES NFR BLD AUTO: 10.5 %
MCH RBC QN AUTO: 25.1 PG (ref 26–34)
MCHC RBC AUTO-ENTMCNC: 30.2 G/DL (ref 32–36)
MCV RBC AUTO: 83 FL (ref 80–100)
MONOCYTES # BLD AUTO: 0.3 X10*3/UL (ref 0.1–1)
MONOCYTES NFR BLD AUTO: 2.7 %
MUCOUS THREADS #/AREA URNS AUTO: ABNORMAL /LPF
NEUTROPHILS # BLD AUTO: 9.55 X10*3/UL (ref 1.2–7.7)
NEUTROPHILS NFR BLD AUTO: 85.7 %
NITRITE UR QL STRIP.AUTO: NEGATIVE
NRBC BLD-RTO: 0 /100 WBCS (ref 0–0)
PH UR STRIP.AUTO: 5.5 [PH]
PLATELET # BLD AUTO: 340 X10*3/UL (ref 150–450)
POTASSIUM SERPL-SCNC: 3.7 MMOL/L (ref 3.5–5.3)
PROT SERPL-MCNC: 7.3 G/DL (ref 6.4–8.2)
PROT UR STRIP.AUTO-MCNC: NEGATIVE MG/DL
RBC # BLD AUTO: 4.42 X10*6/UL (ref 4–5.2)
RBC # UR STRIP.AUTO: NEGATIVE MG/DL
RBC #/AREA URNS AUTO: ABNORMAL /HPF
SODIUM SERPL-SCNC: 137 MMOL/L (ref 136–145)
SP GR UR STRIP.AUTO: 1.02
SQUAMOUS #/AREA URNS AUTO: ABNORMAL /HPF
UROBILINOGEN UR STRIP.AUTO-MCNC: NORMAL MG/DL
WBC # BLD AUTO: 11.1 X10*3/UL (ref 4.4–11.3)
WBC #/AREA URNS AUTO: ABNORMAL /HPF

## 2025-04-23 PROCEDURE — 36415 COLL VENOUS BLD VENIPUNCTURE: CPT

## 2025-04-23 PROCEDURE — 96376 TX/PRO/DX INJ SAME DRUG ADON: CPT

## 2025-04-23 PROCEDURE — 86304 IMMUNOASSAY TUMOR CA 125: CPT | Mod: AHULAB

## 2025-04-23 PROCEDURE — 74177 CT ABD & PELVIS W/CONTRAST: CPT

## 2025-04-23 PROCEDURE — 86301 IMMUNOASSAY TUMOR CA 19-9: CPT | Mod: AHULAB

## 2025-04-23 PROCEDURE — 96375 TX/PRO/DX INJ NEW DRUG ADDON: CPT

## 2025-04-23 PROCEDURE — 76856 US EXAM PELVIC COMPLETE: CPT | Performed by: RADIOLOGY

## 2025-04-23 PROCEDURE — 76856 US EXAM PELVIC COMPLETE: CPT

## 2025-04-23 PROCEDURE — 2500000002 HC RX 250 W HCPCS SELF ADMINISTERED DRUGS (ALT 637 FOR MEDICARE OP, ALT 636 FOR OP/ED)

## 2025-04-23 PROCEDURE — 76830 TRANSVAGINAL US NON-OB: CPT | Performed by: RADIOLOGY

## 2025-04-23 PROCEDURE — 80053 COMPREHEN METABOLIC PANEL: CPT

## 2025-04-23 PROCEDURE — 84702 CHORIONIC GONADOTROPIN TEST: CPT

## 2025-04-23 PROCEDURE — 96374 THER/PROPH/DIAG INJ IV PUSH: CPT

## 2025-04-23 PROCEDURE — 85025 COMPLETE CBC W/AUTO DIFF WBC: CPT

## 2025-04-23 PROCEDURE — 2500000004 HC RX 250 GENERAL PHARMACY W/ HCPCS (ALT 636 FOR OP/ED)

## 2025-04-23 PROCEDURE — 2500000001 HC RX 250 WO HCPCS SELF ADMINISTERED DRUGS (ALT 637 FOR MEDICARE OP)

## 2025-04-23 PROCEDURE — 83605 ASSAY OF LACTIC ACID: CPT

## 2025-04-23 PROCEDURE — 83690 ASSAY OF LIPASE: CPT

## 2025-04-23 PROCEDURE — 81001 URINALYSIS AUTO W/SCOPE: CPT

## 2025-04-23 PROCEDURE — 2550000001 HC RX 255 CONTRASTS

## 2025-04-23 PROCEDURE — 99285 EMERGENCY DEPT VISIT HI MDM: CPT | Mod: 25

## 2025-04-23 PROCEDURE — 87086 URINE CULTURE/COLONY COUNT: CPT | Mod: AHULAB

## 2025-04-23 RX ORDER — KETOROLAC TROMETHAMINE 30 MG/ML
15 INJECTION, SOLUTION INTRAMUSCULAR; INTRAVENOUS ONCE
Status: COMPLETED | OUTPATIENT
Start: 2025-04-23 | End: 2025-04-23

## 2025-04-23 RX ORDER — NITROFURANTOIN 25; 75 MG/1; MG/1
100 CAPSULE ORAL ONCE
Status: COMPLETED | OUTPATIENT
Start: 2025-04-23 | End: 2025-04-23

## 2025-04-23 RX ORDER — HYDROCODONE BITARTRATE AND ACETAMINOPHEN 5; 325 MG/1; MG/1
1 TABLET ORAL EVERY 8 HOURS PRN
Qty: 9 TABLET | Refills: 0 | Status: SHIPPED | OUTPATIENT
Start: 2025-04-23 | End: 2025-04-26

## 2025-04-23 RX ORDER — NITROFURANTOIN 25; 75 MG/1; MG/1
100 CAPSULE ORAL 2 TIMES DAILY
Qty: 9 CAPSULE | Refills: 0 | Status: SHIPPED | OUTPATIENT
Start: 2025-04-23 | End: 2025-04-28

## 2025-04-23 RX ORDER — ONDANSETRON HYDROCHLORIDE 2 MG/ML
4 INJECTION, SOLUTION INTRAVENOUS ONCE
Status: COMPLETED | OUTPATIENT
Start: 2025-04-23 | End: 2025-04-23

## 2025-04-23 RX ORDER — ACETAMINOPHEN 325 MG/1
975 TABLET ORAL ONCE
Status: COMPLETED | OUTPATIENT
Start: 2025-04-23 | End: 2025-04-23

## 2025-04-23 RX ADMIN — KETOROLAC TROMETHAMINE 15 MG: 30 INJECTION, SOLUTION INTRAMUSCULAR at 09:22

## 2025-04-23 RX ADMIN — ACETAMINOPHEN 975 MG: 325 TABLET ORAL at 12:40

## 2025-04-23 RX ADMIN — ONDANSETRON 4 MG: 2 INJECTION, SOLUTION INTRAMUSCULAR; INTRAVENOUS at 07:51

## 2025-04-23 RX ADMIN — KETOROLAC TROMETHAMINE 15 MG: 30 INJECTION, SOLUTION INTRAMUSCULAR at 12:39

## 2025-04-23 RX ADMIN — NITROFURANTOIN MONOHYDRATE/MACROCRYSTALLINE 100 MG: 25; 75 CAPSULE ORAL at 11:16

## 2025-04-23 RX ADMIN — IOHEXOL 75 ML: 350 INJECTION, SOLUTION INTRAVENOUS at 08:51

## 2025-04-23 ASSESSMENT — PAIN SCALES - GENERAL
PAINLEVEL_OUTOF10: 10 - WORST POSSIBLE PAIN
PAINLEVEL_OUTOF10: 5 - MODERATE PAIN
PAINLEVEL_OUTOF10: 0 - NO PAIN
PAINLEVEL_OUTOF10: 9

## 2025-04-23 ASSESSMENT — PAIN DESCRIPTION - ORIENTATION: ORIENTATION: LOWER

## 2025-04-23 ASSESSMENT — PAIN DESCRIPTION - LOCATION
LOCATION: ABDOMEN

## 2025-04-23 ASSESSMENT — PAIN DESCRIPTION - DESCRIPTORS
DESCRIPTORS: ACHING
DESCRIPTORS: ACHING

## 2025-04-23 ASSESSMENT — PAIN DESCRIPTION - FREQUENCY: FREQUENCY: CONSTANT/CONTINUOUS

## 2025-04-23 ASSESSMENT — PAIN DESCRIPTION - PAIN TYPE
TYPE: ACUTE PAIN
TYPE: ACUTE PAIN

## 2025-04-23 ASSESSMENT — PAIN - FUNCTIONAL ASSESSMENT
PAIN_FUNCTIONAL_ASSESSMENT: 0-10
PAIN_FUNCTIONAL_ASSESSMENT: 0-10

## 2025-04-23 ASSESSMENT — PAIN DESCRIPTION - ONSET: ONSET: GRADUAL

## 2025-04-23 ASSESSMENT — PAIN DESCRIPTION - PROGRESSION: CLINICAL_PROGRESSION: NOT CHANGED

## 2025-04-23 NOTE — DISCHARGE INSTRUCTIONS
Please return to the ED immediately if you have any new or worsening signs or symptoms  Please follow with your primary care doctor and gynecology within 3 days

## 2025-04-23 NOTE — ED TRIAGE NOTES
PT having lower abd pain that started this morning. PT states nausea, denies vomiting or diarrhea.

## 2025-04-23 NOTE — ED PROVIDER NOTES
HPI   Chief Complaint   Patient presents with    Abdominal Pain       44-year-old female past medical history of thyroid nodule, toxic multinodular goiter, history of parathyroidectomy surgery presents to the ED today with a chief concern of abdominal pain.  Patient reports symptoms started this morning.  She reports that she woke up this morning with this aching left lower quadrant abdominal pain.  Denies any radiation of the pain.  She reports the pain is constant.  Denies fevers.  Denies vomiting denies flank pain.  Denies chest pain or shortness of breath.  Denies syncope.  Denies vaginal discharge.  Denies urinary symptoms.  Denies any concern for STDs.  She had similar symptoms in the past and does not know the outcome.  Reports mild nausea.  Denies any chest pain or shortness of breath.  Denies syncope.  Denies lightheadedness or dizziness or imbalance.  Had a normal bowel movement this morning without any blood.  Has no other symptoms or concerns at this time.      History provided by:  Patient   used: No            Patient History   Medical History[1]  Surgical History[2]  Family History[3]  Social History[4]    Physical Exam   ED Triage Vitals [04/23/25 0729]   Temperature Heart Rate Respirations BP   36.7 °C (98 °F) 97 18 155/75      Pulse Ox Temp Source Heart Rate Source Patient Position   100 % Temporal -- Sitting      BP Location FiO2 (%)     Right arm --       Physical Exam  Constitutional: Vital signs per nursing notes.  Well developed, well nourished.  No acute distress.    Eyes:  conjunctivae and lids normal  ENT: Ears normal externally; face symmetric. voice normal  Neck: neck supple, no meningismus; trachea midline without deviation  Respiratory: normal respiratory effort and excursion; no rales, rhonchi, or wheezes; equal air entry  Cardiovascular: RRR, 2+ pulses extremities   Neurological: normal speech; CN II-XII grossly intact, normal motor and sensory function  GI: no  distention, soft, nontender.  No rebound tenderness or guarding.  No palpable masses.  No pulsatile masses.  : Deferred  Musculoskeletal: normal gait and station; normal digits and nails; normal to palpation; normal strength/tone; neurovascular status intact.  Skin: normal to inspection; normal to palpation; no rash      ED Course & [facility]   ED Course as of 04/25/25 0816   Wed Apr 23, 2025   1045 IMPRESSION:  1. Large heterogenous thick-walled uterine lesion, for which further  evaluation with nonemergent MRI is recommended. The differential  diagnosis includes uterine neoplasm and atypical fibroid, perhaps  with a component of degeneration. Correlation with serum tumor  markers such as CA-125, CEA, and CA 19-9 may also be useful.  2. No abdominopelvic lymphadenopathy or peritoneal implants.  3. Sub 5 mm hypoattenuating hepatic lesion is noted, which is too  small to further characterize.  4. Low-lying IUD noted.      MACRO:  Critical Finding:  See findings. Notification was initiated on  4/23/2025 at 9:50 am by  Wilda Mike.  (**-[facility]-**) Instructions:      Signed by: Wilda Mike 4/23/2025 9:50 AM  Dictation workstation:   [id number]   []   1045 CBC shows no evidence of leukocytosis.  Hemoglobin 11.1.  CMP shows normal kidney and liver function.  Urinalysis shows positive leukocyte restoration and 11-20 white blood cells in the urine however urine is contaminated.  hCG, lipase, lactate normal []   1215 IMPRESSION:  1. Heterogeneous uterus with large fibroids, as above.  2. Endometrium and right ovary not visualized.      MACRO:  None      Signed by: Tom Bolanos 4/23/2025 11:34 AM  Dictation workstation:   [id number]   []   1227 Dr. Bauer from [facility] patient can have close follow up  [MC]      ED Course User Index  [] Bennie Sweeney PA-C         Diagnoses as of 04/25/25 0816   Lower abdominal pain   Fibroids   Cystitis   Liver lesion                 No data recorded     Harsh Coma Scale Score:  15 (04/23/25 0730 : Raudel Villa, EMT)                           Medical Decision Making  44-year-old female past medical history of thyroid nodule, toxic multinodular goiter, history of parathyroidectomy surgery presents to the ED today with a chief concern of abdominal pain.  Patient was given Zofran in the ED. Not hypoxic.  Afebrile.  Labs reassuring.  Her CBC showed no evidence of leukocytosis.  Her CMP showed normal kidney and liver function.  Her lactate and lipase were normal.  Her hCG is negative.  Not concern for any intrauterine or ectopic pregnancy at this time.  Her urinalysis did show positive leukocyte esterase and 11-20 white blood cells in the urine.  Will treat with Macrobid at this time however we will also send urine for culture.  She has no evidence of pyelonephritis.  Her CT of the abdomen showed large heterogenous thick-walled uterine lesion.  Differential include uterine neoplasm and atypical fibroid.  No evidence of abdominal pelvic lymphadenopathy.  Did have a liver lesion noted as well as a low-lying IUD.  Patient was informed of these findings.  Her ultrasound of the pelvis shows heterogenous uterus with large fibroid.  The fibroid actually measures almost 13 cm.  I spoke with gynecology who did not feel this was emergent at this time.  She had normal color Doppler flow to the left ovary.  I have low suspicion for any torsion at this time.  Her CT showed no other acute findings.  Not concern for any other acute intra-abdominal herbology such as not limited to appendicitis, SBO, gallbladder pathology, pancreatitis, AAA, mesenteric ischemia.  Patient was given Zofran, Toradol, nitrofurantoin, and Tylenol in the ED.  She felt better after administration of his medications.  She denied any chest or shortness of breath.  She had full range of motion of her hip and no overlying skin changes in the area.  Not concern for any septic joint at this time.  Will refer to gynecology.  Gynecology also  recommended adding on a  and CA 19-9 which were negative.  The patient denies any chance of STDs and had no discharge.  She was not concerned for STDs.   exam deferred at this time.  Discussed impression and findings with patient she feels comfortable returning home.  We discussed very strict return precautions including returning for any new or worsening signs or symptoms.  Patient is in agreement with this plan.  She will follow-up with the PCP and gynecology within 3 days.  Patient was instructed to use Tylenol and ibuprofen for pain as needed.  She was given a short course of Norco for breakthrough pain as needed.  Discussed use and possible side effects of this medication.  [place]AR[place] was reviewed was negative for any concerning findings.    Differential diagnosis: Diverticulitis, ovarian torsion, PID, TOA, ectopic pregnancy, ureterolithiasis, nephrolithiasis, appendicitis, SBO, constipation    Disposition/treatment  1.  See diagnosis    Shared decision-making was used patient feels comfortable returning home     Patient was advised to follow up with recommended provider in 1 day for another evaluation and exam. I advised patient/guardian to return or go to closest emergency room immediately if symptoms change, get worse, new symptoms develop prior to follow up. If there is no improvement in symptoms in the next 24 hours they are advised to return for further evaluation and exam. I also explained the plan and treatment course. Patient/guardian is in agreement with plan, treatment course, and follow up and states verbally that they will comply.    Patient is homegoing. I discussed the differential; results and discharge plan with the patient and/or family/friend/caregiver if present.  I emphasized the importance of follow-up with the physician I referred them to in the timeframe recommended.  I explained reasons for the patient to return to the Emergency Department. They agreed that if they feel their  condition is worsening or if they have any other concern they should call 911 immediately for further assistance. I gave the patient an opportunity to ask all questions they had and answered all of them accordingly. They understand return precautions and discharge instructions. The patient and/or family/friend/caregiver expressed understanding verbally and that they would comply.        This note has been transcribed using voice recognition and may contain grammatical errors, misplaced words, incorrect words, incorrect phrases or other errors.        Procedure  Procedures       Bennie Sweeney PA-C  04/25/25 0816         [1]   Past Medical History:  Diagnosis Date    Anemia     heavy peroids, h&H 7.8/28.7 9/3 transfused one units PRBC    Fibroids     IUD (intrauterine device) in place     Thyroid goiter     Thyroiditis    [2]   Past Surgical History:  Procedure Laterality Date    INTRAUTERINE DEVICE INSERTION      THYROID SURGERY Left     biopsy 9/24/24   [3]   Family History  Problem Relation Name Age of Onset    Hypertension Mother Anam     No Known Problems Father      Other (htn) Brother      No Known Problems Brother      Hypertension Mother's Sister Yarely     Breast cancer Mother's Sister Yarely     Brianig Hypertension Mother's Brother     [4]   Social History  Tobacco Use    Smoking status: Never     Passive exposure: Past    Smokeless tobacco: Never   Vaping Use    Vaping status: Never Used   Substance Use Topics    Alcohol use: Not Currently     Comment: 2/month    Drug use: Never        Bennie Sweeney PA-C  04/25/25 0817

## 2025-04-24 LAB — BACTERIA UR CULT: ABNORMAL

## 2025-04-25 ENCOUNTER — TELEPHONE (OUTPATIENT)
Dept: PHARMACY | Facility: HOSPITAL | Age: 45
End: 2025-04-25
Payer: COMMERCIAL

## 2025-04-25 NOTE — PROGRESS NOTES
EDPD Note: Antibiotics Reviewed and Warranted    Contacted Mr./Mrs./Ms. Radha Dakota regarding a positive urine culture/result that was taken during their recent emergency room visit. I completed education with patient . The patient is being treated appropriately with Macrobid.    Presented to ED with abdominal pain. Denied urinary or systemic sxs. Has hx of goiter, parathyroidectomy. Discharged with Macrobid 100 mg bid x 5 days.     Upon conversation this morning, she is doing much better. She is taking her abx and her abdominal pain resolved today. Given improvement, will continue full course of abx. She did set follow ups with both OB and her PCP.      Susceptibility data from last 90 days.  Collected Specimen Info Organism   04/23/25 Urine from Clean Catch/Voided Streptococcus agalactiae (Group B Streptococcus)        No further follow up needed from EDPD Team.     Uriel Yun, PharmD

## 2025-05-12 ENCOUNTER — APPOINTMENT (OUTPATIENT)
Facility: CLINIC | Age: 45
End: 2025-05-12
Payer: COMMERCIAL

## 2025-05-20 ENCOUNTER — TELEPHONE (OUTPATIENT)
Dept: PLASTIC SURGERY | Facility: CLINIC | Age: 45
End: 2025-05-20
Payer: COMMERCIAL

## 2025-05-22 ENCOUNTER — APPOINTMENT (OUTPATIENT)
Dept: PLASTIC SURGERY | Facility: CLINIC | Age: 45
End: 2025-05-22
Payer: COMMERCIAL

## 2025-06-02 ENCOUNTER — APPOINTMENT (OUTPATIENT)
Facility: CLINIC | Age: 45
End: 2025-06-02
Payer: COMMERCIAL

## 2025-06-02 VITALS
SYSTOLIC BLOOD PRESSURE: 127 MMHG | DIASTOLIC BLOOD PRESSURE: 78 MMHG | BODY MASS INDEX: 40.3 KG/M2 | WEIGHT: 256.8 LBS | HEIGHT: 67 IN

## 2025-06-02 DIAGNOSIS — Z01.419 ENCOUNTER FOR GYNECOLOGICAL EXAMINATION: Primary | ICD-10-CM

## 2025-06-02 DIAGNOSIS — T83.32XA MALPOSITIONED INTRAUTERINE DEVICE (IUD), INITIAL ENCOUNTER: ICD-10-CM

## 2025-06-02 DIAGNOSIS — R10.30 LOWER ABDOMINAL PAIN: ICD-10-CM

## 2025-06-02 PROCEDURE — 1036F TOBACCO NON-USER: CPT | Performed by: OBSTETRICS & GYNECOLOGY

## 2025-06-02 PROCEDURE — 58301 REMOVE INTRAUTERINE DEVICE: CPT | Performed by: OBSTETRICS & GYNECOLOGY

## 2025-06-02 PROCEDURE — 88175 CYTOPATH C/V AUTO FLUID REDO: CPT

## 2025-06-02 PROCEDURE — 99204 OFFICE O/P NEW MOD 45 MIN: CPT | Performed by: OBSTETRICS & GYNECOLOGY

## 2025-06-02 PROCEDURE — 87626 HPV SEP HI-RSK TYP&POOL RSLT: CPT

## 2025-06-02 PROCEDURE — 3008F BODY MASS INDEX DOCD: CPT | Performed by: OBSTETRICS & GYNECOLOGY

## 2025-06-02 ASSESSMENT — PAIN SCALES - GENERAL: PAINLEVEL_OUTOF10: 3

## 2025-06-03 ENCOUNTER — APPOINTMENT (OUTPATIENT)
Dept: PRIMARY CARE | Facility: CLINIC | Age: 45
End: 2025-06-03
Payer: COMMERCIAL

## 2025-06-03 VITALS
BODY MASS INDEX: 41.18 KG/M2 | DIASTOLIC BLOOD PRESSURE: 72 MMHG | SYSTOLIC BLOOD PRESSURE: 116 MMHG | HEART RATE: 66 BPM | TEMPERATURE: 97 F | WEIGHT: 259 LBS | OXYGEN SATURATION: 100 %

## 2025-06-03 DIAGNOSIS — R10.30 LOWER ABDOMINAL PAIN: ICD-10-CM

## 2025-06-03 DIAGNOSIS — E66.01 MORBID OBESITY (MULTI): Primary | ICD-10-CM

## 2025-06-03 PROCEDURE — 1036F TOBACCO NON-USER: CPT | Performed by: FAMILY MEDICINE

## 2025-06-03 PROCEDURE — 99213 OFFICE O/P EST LOW 20 MIN: CPT | Performed by: FAMILY MEDICINE

## 2025-06-03 ASSESSMENT — ENCOUNTER SYMPTOMS: ABDOMINAL PAIN: 1

## 2025-06-03 NOTE — PROGRESS NOTES
Subjective   Patient ID: Radha Duncan is a 44 y.o. female who presents for Abdominal Pain (discuss).  Abdominal Pain     patient presents for follow-up from emergency room pelvic pain.  They did an ultrasound and saw that she had a large fibroid.  She was referred to the GYN here.  She saw her yesterday and he thought that the fibroid was [place].  Instead he thought that the IUD was poking at her and did not and the pain has gotten better.    She also has been trying to lose weight.  Has lost about 130 pounds so far.  Has been plateauing recently.  Trying to exercise 5 days a week and watch her diet.  She was mainly able to lose weight when she was calorie counting but she has not been calorie counting recently.    Problem List[1]    Social Connections: Not on file       Medications Ordered Prior to Encounter[2]     Vitals:    06/03/25 1509   BP: 116/72   Pulse: 66   Temp: 36.1 °C (97 °F)   SpO2: 100%     Vitals:    06/03/25 1509   Weight: 117 kg (259 lb)       Review of Systems   Gastrointestinal:  Positive for abdominal pain.   All other systems reviewed and are negative.      Objective     Physical Exam  Constitutional:       Appearance: Normal appearance. She is well-developed.   HENT:      Head: Atraumatic.   Cardiovascular:      Rate and Rhythm: Normal rate and regular rhythm.      Heart sounds: Normal heart sounds. No murmur heard.  Pulmonary:      Effort: Pulmonary effort is normal.      Breath sounds: Normal breath sounds.   Abdominal:      General: Bowel sounds are normal.      Palpations: Abdomen is soft.   Skin:     General: Skin is warm.   Neurological:      General: No focal deficit present.      Mental Status: She is alert.   Psychiatric:         Mood and Affect: Mood normal.         Admission on 04/23/2025, Discharged on 04/23/2025   Component Date Value Ref Range Status    WBC 04/23/2025 11.1  4.4 - 11.3 x10*3/uL Final    nRBC 04/23/2025 0.0  0.0 - 0.0 /100 WBCs Final    RBC 04/23/2025 4.42   4.00 - 5.20 x10*6/uL Final    Hemoglobin 04/23/2025 11.1 (L)  12.0 - 16.0 g/dL Final    Hematocrit 04/23/2025 36.7  36.0 - 46.0 % Final    MCV 04/23/2025 83  80 - 100 fL Final    MCH 04/23/2025 25.1 (L)  26.0 - 34.0 pg Final    MCHC 04/23/2025 30.2 (L)  32.0 - 36.0 g/dL Final    RDW 04/23/2025 15.8 (H)  11.5 - 14.5 % Final    Platelets 04/23/2025 340  150 - 450 x10*3/uL Final    Neutrophils % 04/23/2025 85.7  40.0 - 80.0 % Final    Immature Granulocytes %, Automated 04/23/2025 0.4  0.0 - 0.9 % Final    Immature Granulocyte Count (IG) includes promyelocytes, myelocytes and metamyelocytes but does not include bands. Percent differential counts (%) should be interpreted in the context of the absolute cell counts (cells/UL).    Lymphocytes % 04/23/2025 10.5  13.0 - 44.0 % Final    Monocytes % 04/23/2025 2.7  2.0 - 10.0 % Final    Eosinophils % 04/23/2025 0.5  0.0 - 6.0 % Final    Basophils % 04/23/2025 0.2  0.0 - 2.0 % Final    Neutrophils Absolute 04/23/2025 9.55 (H)  1.20 - 7.70 x10*3/uL Final    Percent differential counts (%) should be interpreted in the context of the absolute cell counts (cells/uL).    Immature Granulocytes Absolute, Au* 04/23/2025 0.04  0.00 - 0.70 x10*3/uL Final    Lymphocytes Absolute 04/23/2025 1.17 (L)  1.20 - 4.80 x10*3/uL Final    Monocytes Absolute 04/23/2025 0.30  0.10 - 1.00 x10*3/uL Final    Eosinophils Absolute 04/23/2025 0.06  0.00 - 0.70 x10*3/uL Final    Basophils Absolute 04/23/2025 0.02  0.00 - 0.10 x10*3/uL Final    Glucose 04/23/2025 98  74 - 99 mg/dL Final    Sodium 04/23/2025 137  136 - 145 mmol/L Final    Potassium 04/23/2025 3.7  3.5 - 5.3 mmol/L Final    Chloride 04/23/2025 105  98 - 107 mmol/L Final    Bicarbonate 04/23/2025 26  21 - 32 mmol/L Final    Anion Gap 04/23/2025 10  10 - 20 mmol/L Final    Urea Nitrogen 04/23/2025 14  6 - 23 mg/dL Final    Creatinine 04/23/2025 0.76  0.50 - 1.05 mg/dL Final    eGFR 04/23/2025 >90  >60 mL/min/1.73m*2 Final    Calculations of  estimated GFR are performed using the 2021 CKD-EPI Study Refit equation without the race variable for the IDMS-Traceable creatinine methods.  https://jasn.asnjournals.org/content/early/2021/09/22/ASN.5517110343    Calcium 04/23/2025 8.9  8.6 - 10.3 mg/dL Final    Albumin 04/23/2025 4.1  3.4 - 5.0 g/dL Final    Alkaline Phosphatase 04/23/2025 42  33 - 110 U/L Final    Total Protein 04/23/2025 7.3  6.4 - 8.2 g/dL Final    AST 04/23/2025 15  9 - 39 U/L Final    Bilirubin, Total 04/23/2025 0.6  0.0 - 1.2 mg/dL Final    ALT 04/23/2025 9  7 - 45 U/L Final    Patients treated with Sulfasalazine may generate falsely decreased results for ALT.    Lactate 04/23/2025 1.2  0.4 - 2.0 mmol/L Final    Lipase 04/23/2025 23  9 - 82 U/L Final    HCG, Beta-Quantitative 04/23/2025 <2  <5 mIU/mL Final    Color, Urine 04/23/2025 Light-Yellow  Light-Yellow, Yellow, Dark-Yellow Final    Appearance, Urine 04/23/2025 Clear  Clear Final    Specific Gravity, Urine 04/23/2025 1.025  1.005 - 1.035 Final    pH, Urine 04/23/2025 5.5  5.0, 5.5, 6.0, 6.5, 7.0, 7.5, 8.0 Final    Protein, Urine 04/23/2025 NEGATIVE  NEGATIVE, 10 (TRACE), 20 (TRACE) mg/dL Final    Glucose, Urine 04/23/2025 Normal  Normal mg/dL Final    Blood, Urine 04/23/2025 NEGATIVE  NEGATIVE mg/dL Final    Ketones, Urine 04/23/2025 NEGATIVE  NEGATIVE mg/dL Final    Bilirubin, Urine 04/23/2025 NEGATIVE  NEGATIVE mg/dL Final    Urobilinogen, Urine 04/23/2025 Normal  Normal mg/dL Final    Nitrite, Urine 04/23/2025 NEGATIVE  NEGATIVE Final    Leukocyte Esterase, Urine 04/23/2025 250 Young/uL (A)  NEGATIVE Final    WBC, Urine 04/23/2025 11-20 (A)  1-5, NONE /HPF Final    RBC, Urine 04/23/2025 3-5  NONE, 1-2, 3-5 /HPF Final    Squamous Epithelial Cells, Urine 04/23/2025 1-9 (SPARSE)  Reference range not established. /HPF Final    Mucus, Urine 04/23/2025 2+  Reference range not established. /LPF Final    Urine Culture 04/23/2025 <=10,000 CFU/mL Streptococcus agalactiae (Group B Streptococcus)  (A)   Final    Cancer  04/23/2025 17.5  0.0 - 30.2 U/mL Final    Cancer AG 19-9 04/23/2025 <4.00  <35.00 U/mL Final       Assessment/Plan   Problem List Items Addressed This Visit    None  Visit Diagnoses         Codes      Morbid obesity (Multi)    -  Primary E66.01    Relevant Orders    Referral to Nutrition Services      Lower abdominal pain     R10.30          Refer patient to dietitian.  Encourage calorie counting.  Offered Zepbound but patient would prefer to use natural means of losing weight.  Follow-up if abdominal pain comes back.  There       [1]   Patient Active Problem List  Diagnosis    Thyroid nodule    Nontoxic multinodular goiter    Neck mass    Throat discomfort    Goiter    Multinodular thyroid    Neck pain    Neck stiffness    H/O partial thyroidectomy    History of parathyroid surgery   [2]   Current Outpatient Medications on File Prior to Visit   Medication Sig Dispense Refill    cholecalciferol, vitamin D3, (VITAMIN D3 ORAL) Take 1 tablet by mouth once daily.      ferrous sulfate, 325 mg ferrous sulfate, tablet Take 1 tablet (325 mg) by mouth once daily with breakfast. 90 tablet 2    multivitamin tablet Take 1 tablet by mouth once daily.      psyllium seed, with sugar, (FIBER ORAL) Take 1 Dose by mouth once daily as needed.       No current facility-administered medications on file prior to visit.

## 2025-06-05 ASSESSMENT — ENCOUNTER SYMPTOMS
CONSTITUTIONAL NEGATIVE: 1
RESPIRATORY NEGATIVE: 1
PSYCHIATRIC NEGATIVE: 1
CARDIOVASCULAR NEGATIVE: 1
EYES NEGATIVE: 1
NEUROLOGICAL NEGATIVE: 1
MUSCULOSKELETAL NEGATIVE: 1
ABDOMINAL PAIN: 1
ALLERGIC/IMMUNOLOGIC NEGATIVE: 1
HEMATOLOGIC/LYMPHATIC NEGATIVE: 1
ENDOCRINE NEGATIVE: 1

## 2025-06-05 NOTE — PROGRESS NOTES
Subjective   Patient ID: Radha Duncan is a 44 y.o. female who presents for Abdominal Pain (Pt is here to follow up from ER.  (Abdominal pain, fibroids, cystitis)./Last pap:  2023  neg/+hpv./Last blessing:  2023  cat 1./Declines marquise.   HAYLEY Villatoro LPN).  Abdominal Pain     patient is here following emergency room visit patient was seen in April in the emergency room for abdominal pain was found to have fibroids but the pain was somewhat inconclusive treated with antibiotics for 48 hours no pain since patient is 44-year-old female  0 of choice last menstrual period 2025 no significant pain patient has IUD inside the uterus for 2 years called Nick during examination MD patient was told that her IUD was loaded in the uterus patient does not smoke she drinks alcohol socially she does not use drugs she uses vitamins she is allergic to penicillin normal Pap test  and normal mammogram  past medical history significant for goiter inflammation requiring partial thyroidectomy family history of cancer of the breast hypertension and strokes    Review of Systems   Constitutional: Negative.    Eyes: Negative.    Respiratory: Negative.     Cardiovascular: Negative.    Gastrointestinal:  Positive for abdominal pain.   Endocrine: Negative.    Genitourinary: Negative.    Musculoskeletal: Negative.    Skin: Negative.    Allergic/Immunologic: Negative.    Neurological: Negative.    Hematological: Negative.    Psychiatric/Behavioral: Negative.         Objective   Physical Exam  Constitutional:       Appearance: Normal appearance.   HENT:      Head: Normocephalic and atraumatic.   Cardiovascular:      Rate and Rhythm: Normal rate and regular rhythm.      Pulses: Normal pulses.      Heart sounds: Normal heart sounds.   Pulmonary:      Effort: Pulmonary effort is normal.      Breath sounds: Normal breath sounds.   Abdominal:      General: Abdomen is flat. Bowel sounds are normal.       Palpations: Abdomen is soft.      Hernia: There is no hernia in the left inguinal area or right inguinal area.   Genitourinary:     General: Normal vulva.      Exam position: Lithotomy position.      Labia:         Right: No rash, tenderness or lesion.         Left: No rash, tenderness or lesion.       Urethra: No prolapse.      Vagina: Normal.      Cervix: Normal.      Uterus: Normal.       Adnexa: Right adnexa normal and left adnexa normal.      Comments: Examination of the cervix reveal reveals IUD presenting itself at the external os IUD grasped with the uterine forceps and slowly removed with slight difficulty IUD was complete upon the removal patient stated that her pain was when she was in the emergency room with similar to that that happened when we removed the IUD  Musculoskeletal:      Cervical back: Normal range of motion and neck supple.   Skin:     General: Skin is warm and dry.   Neurological:      General: No focal deficit present.      Mental Status: She is alert and oriented to person, place, and time.         Assessment/Plan    gynecologic examination malposition of IUD removal of the IUD         Stanley Cervantes MD 06/05/25 5:27 PM

## 2025-06-13 LAB
CYTOLOGY CMNT CVX/VAG CYTO-IMP: NORMAL
HPV HR 12 DNA GENITAL QL NAA+PROBE: NEGATIVE
HPV HR GENOTYPES PNL CVX NAA+PROBE: NEGATIVE
HPV16 DNA SPEC QL NAA+PROBE: NEGATIVE
HPV18 DNA SPEC QL NAA+PROBE: NEGATIVE
LAB AP HPV GENOTYPE QUESTION: YES
LAB AP HPV HR: NORMAL
LABORATORY COMMENT REPORT: NORMAL
LMP START DATE: NORMAL
PATH REPORT.TOTAL CANCER: NORMAL

## (undated) DEVICE — STAY SET, SURGICAL, 12MM BLUNT HOOK, 8/PK

## (undated) DEVICE — SUTURE, SILK, 3-0, 18 IN, MULTIPACK, BLACK

## (undated) DEVICE — STAPLER, SKIN PROXIMATE, 35 WIDE

## (undated) DEVICE — SUTURE, PLAIN, 5-0, 18 IN, PC1, YELLOW

## (undated) DEVICE — Device

## (undated) DEVICE — SUTURE, SILK, 4-0, 18 IN, LABYRINTH, BLACK

## (undated) DEVICE — MARKER, SKIN, DUAL TIP INK W/9 LABEL AND REMOVABLE TIME OUT SLEEVE

## (undated) DEVICE — DRESSING, TRANSPARENT, TEGADERM, 2-3/8 X 2-3/4 IN

## (undated) DEVICE — APPLIER,  LIGACLIP MULTI CLIP, 30 MED 11 1/2

## (undated) DEVICE — CORD, CAUTERY, BIOPOLAR FORCEP, 12FT

## (undated) DEVICE — SUTURE, ETHILON, 3-0, 30 IN, FS-1, BLACK

## (undated) DEVICE — TUBE, EMG ENDOTRACHEAL SZ 6 TRIVANTAGE

## (undated) DEVICE — DISSECTOR, EXACT, LIGASURE

## (undated) DEVICE — ELECTRODE, INSULATED BLADE, EDGE, W/ 2.75 SLEEVE

## (undated) DEVICE — SUTURE, VICRYL, 3-0, 27 IN, SH

## (undated) DEVICE — SUTURE, PROLENE, 2-0, 18 IN, FS, BLUE

## (undated) DEVICE — PREP TRAY, VAGINAL

## (undated) DEVICE — DRAIN, WOUND, FLAT, HUBLESS, FULL LENGTH PERFORATION, 10 MM X 20 CM, SILICONE

## (undated) DEVICE — DRESSING, TRANSPARENT, TEGADERM, 4 X 4-3/4 IN

## (undated) DEVICE — SOLUTION, IRRIGATION, SODIUM CHLORIDE 0.9%, 1000 ML, POUR BOTTLE

## (undated) DEVICE — SUTURE, SILK, 2-0, 18 IN, BLACK

## (undated) DEVICE — STRIP, SKIN CLOSURE, STERI STRIP, REINFORCED, 0.5 X 4 IN

## (undated) DEVICE — PAD, GROUNDING, ELECTROSURGICAL, W/9 FT CABLE, POLYHESIVE II, ADULT, LF

## (undated) DEVICE — EVACUATOR, WOUND, SUCTION, CLOSED, JACKSON-PRATT, 100 CC, SILICONE

## (undated) DEVICE — DRAPE, PAD, INSTRUMENT, MAGNETIC, MEDIUM, 10 X 16 IN, DISPOSABLE

## (undated) DEVICE — STIMULATOR, NERVE LOCATOR, HEAD&NECK

## (undated) DEVICE — PROBE, STIMULATOR, MONOPOLAR, FLUSH TIP, PRASS, W/HANDLE, STANDARD

## (undated) DEVICE — ADHESIVE, SKIN, MASTISOL, 2/3 CC VIAL

## (undated) DEVICE — DRAPE, INSTRUMENT, W/POUCH, STERI DRAPE, 7 X 11 IN, DISPOSABLE, STERILE

## (undated) DEVICE — APPLIER, LIGACLIP, MULTIPLE, SMALL 9-3/8IN

## (undated) DEVICE — LUBRICANT, ELECTROLUBE, F/ELECTRODE TIPS

## (undated) DEVICE — SUTURE, MONOCRYL, 4-0, 18 IN, PS2, UNDYED

## (undated) DEVICE — SUTURE, VICRYL, 3-0,18 IN, SH, UNDYED

## (undated) DEVICE — SPONGE, DISSECTOR, PEANUT, 3/8, STERILE 5 FOAM HOLDER"